# Patient Record
Sex: MALE | Race: WHITE | NOT HISPANIC OR LATINO | Employment: OTHER
[De-identification: names, ages, dates, MRNs, and addresses within clinical notes are randomized per-mention and may not be internally consistent; named-entity substitution may affect disease eponyms.]

---

## 2022-12-02 ENCOUNTER — TRANSCRIBE ORDERS (OUTPATIENT)
Dept: OTHER | Age: 77
End: 2022-12-02

## 2022-12-02 DIAGNOSIS — H11.221 CONJUNCTIVAL GRANULOMA, RIGHT EYE: Primary | ICD-10-CM

## 2022-12-05 ENCOUNTER — PRE VISIT (OUTPATIENT)
Dept: OPHTHALMOLOGY | Facility: CLINIC | Age: 77
End: 2022-12-05

## 2022-12-05 ENCOUNTER — OFFICE VISIT (OUTPATIENT)
Dept: OPHTHALMOLOGY | Facility: CLINIC | Age: 77
End: 2022-12-05
Payer: COMMERCIAL

## 2022-12-05 DIAGNOSIS — H05.89: ICD-10-CM

## 2022-12-05 DIAGNOSIS — H11.221 CONJUNCTIVAL GRANULOMA, RIGHT EYE: Primary | ICD-10-CM

## 2022-12-05 PROCEDURE — 68110 EXC LES CONJUNCTIVA <1 CM: CPT | Mod: RT | Performed by: OPHTHALMOLOGY

## 2022-12-05 PROCEDURE — 99203 OFFICE O/P NEW LOW 30 MIN: CPT | Mod: 25 | Performed by: OPHTHALMOLOGY

## 2022-12-05 PROCEDURE — 88305 TISSUE EXAM BY PATHOLOGIST: CPT | Mod: 26 | Performed by: DERMATOLOGY

## 2022-12-05 PROCEDURE — 88341 IMHCHEM/IMCYTCHM EA ADD ANTB: CPT | Mod: 26 | Performed by: DERMATOLOGY

## 2022-12-05 PROCEDURE — 92285 EXTERNAL OCULAR PHOTOGRAPHY: CPT | Mod: GC | Performed by: OPHTHALMOLOGY

## 2022-12-05 PROCEDURE — 88342 IMHCHEM/IMCYTCHM 1ST ANTB: CPT | Mod: 26 | Performed by: DERMATOLOGY

## 2022-12-05 PROCEDURE — 88305 TISSUE EXAM BY PATHOLOGIST: CPT | Mod: TC | Performed by: OPHTHALMOLOGY

## 2022-12-05 RX ORDER — TAMSULOSIN HYDROCHLORIDE 0.4 MG/1
CAPSULE ORAL
COMMUNITY
Start: 2022-03-14

## 2022-12-05 RX ORDER — LATANOPROST 50 UG/ML
1 SOLUTION/ DROPS OPHTHALMIC
COMMUNITY
End: 2023-12-04

## 2022-12-05 RX ORDER — ERYTHROMYCIN 5 MG/G
OINTMENT OPHTHALMIC ONCE
Status: COMPLETED | OUTPATIENT
Start: 2022-12-05 | End: 2022-12-05

## 2022-12-05 RX ORDER — METHOCARBAMOL 750 MG/1
TABLET, FILM COATED ORAL
COMMUNITY
Start: 2021-10-20

## 2022-12-05 RX ORDER — BUPROPION HYDROCHLORIDE 75 MG/1
TABLET ORAL
COMMUNITY

## 2022-12-05 RX ORDER — ERYTHROMYCIN 5 MG/G
1 OINTMENT OPHTHALMIC
COMMUNITY

## 2022-12-05 RX ORDER — TRAZODONE HYDROCHLORIDE 100 MG/1
1 TABLET ORAL AT BEDTIME
COMMUNITY
Start: 2021-12-27

## 2022-12-05 RX ORDER — PRAVASTATIN SODIUM 40 MG
TABLET ORAL
COMMUNITY
Start: 2021-10-20

## 2022-12-05 RX ORDER — ACETAMINOPHEN 500 MG
TABLET ORAL
Status: ON HOLD | COMMUNITY
Start: 2021-10-20 | End: 2023-04-05

## 2022-12-05 RX ORDER — FEBUXOSTAT 80 MG/1
TABLET, FILM COATED ORAL
COMMUNITY
Start: 2022-03-14

## 2022-12-05 RX ORDER — ALLOPURINOL 100 MG/1
250 TABLET ORAL DAILY
COMMUNITY

## 2022-12-05 RX ORDER — DULOXETIN HYDROCHLORIDE 60 MG/1
CAPSULE, DELAYED RELEASE ORAL
COMMUNITY
Start: 2021-05-11

## 2022-12-05 RX ADMIN — ERYTHROMYCIN: 5 OINTMENT OPHTHALMIC at 15:17

## 2022-12-05 ASSESSMENT — CONF VISUAL FIELD
OS_INFERIOR_NASAL_RESTRICTION: 0
OS_NORMAL: 1
OS_SUPERIOR_TEMPORAL_RESTRICTION: 0
METHOD: COUNTING FINGERS
OD_NORMAL: 1
OD_INFERIOR_TEMPORAL_RESTRICTION: 0
OD_SUPERIOR_TEMPORAL_RESTRICTION: 0
OD_SUPERIOR_NASAL_RESTRICTION: 0
OS_INFERIOR_TEMPORAL_RESTRICTION: 0
OS_SUPERIOR_NASAL_RESTRICTION: 0
OD_INFERIOR_NASAL_RESTRICTION: 0

## 2022-12-05 ASSESSMENT — SLIT LAMP EXAM - LIDS: COMMENTS: NORMAL

## 2022-12-05 ASSESSMENT — VISUAL ACUITY
OD_CC: 20/80
OD_CC+: -1
OS_CC: 20/40
OD_SC: 20/60
OS_CC+: -1
METHOD: SNELLEN - LINEAR
CORRECTION_TYPE: GLASSES

## 2022-12-05 ASSESSMENT — TONOMETRY
OS_IOP_MMHG: 15
OD_IOP_MMHG: 14
IOP_METHOD: ICARE

## 2022-12-05 ASSESSMENT — EXTERNAL EXAM - LEFT EYE: OS_EXAM: NORMAL

## 2022-12-05 NOTE — LETTER
2022         RE:  :  MRN: Arthur Garcia  1945  [id number]     Dear Dr. Weinstein,    Thank you for asking me to see your patient, Arthur Garcia, for an oculoplastic   consultation.  My assessment and plan are below.  For further details, please see my attached clinic note.      [facility]     Assessment & Plan     Arthur Garcia is a 77 year old male with the following diagnoses:   1. Conjunctival granuloma, right eye       Right lower lid lesion  - large pyogenic granuloma vs vascular lesion  - continue [facility]  - recommend excision in the operating room     PLAN:   Right lower lid conjunctival excision of lesion.           Again, thank you for allowing me to participate in the care of your patient.      Sincerely,    Keven Gomez MD  Department of Ophthalmology and Visual Neurosciences  [facility]    CC: [address] [address]  Via Fax: [phone]

## 2022-12-05 NOTE — PROGRESS NOTES
"Chief Complaints and History of Present Illnesses   Patient presents with     Consult For     Conjunctival pyogenic granuloma, right eye, referred by Dr Suma Em     Chief Complaint(s) and History of Present Illness(es)     Consult For    In right eye.  This started 3 years ago.  Associated symptoms include   dryness, eye pain, redness and photophobia.  Treatments tried include eye   drops, artificial tears and ointment.  Pain was noted as 2/10. Additional   comments: Conjunctival pyogenic granuloma, right eye, referred by Dr Suma Em           Comments    His right eye has been better in the past 3 years with redness, blurred   vision and a scratchy right cornea.   His right eye has been bleeding for   the past 2 weeks.  The bleeding typically happens at night but at times   hemorrhages during the day.  Three weeks ago he rubbed from the corner of   his eye what looked like flax seed.    Aubrie Enriquez, COT 2:13 PM  December 5, 2022                        6 month history of right corneal abrasion (now healed, but took several months), and was put on various drops/ointment, including steroid drops (still using). 3 weeks ago he noticed development of lower lid lesion with bloody drainage. First he noticed a \"flax seed\" from his lower lid, and since then has noticed daily bleeding usually at night. No recent URI.    Currently using tobradex drop 3-4x/day     [facility]     Assessment & Plan     Arthur Garcia is a 77 year old male with the following diagnoses:   1. Conjunctival granuloma, right eye       Right lower lid lesion  - large pyogenic granuloma vs vascular lesion  - [facility]  - recommend excision in the operating room     PLAN:   Right lower lid conjunctival excision of lesion.             Chaya Mane MD  Oculoplastics Fellow    Attending Physician Attestation:  I have seen and examined this patient with the fellow .  I have confirmed and edited as " necessary the chief complaint(s), history of present illness, review of systems, relevant history, and examination findings as documented by others.  I have personally reviewed the relevant tests, images, and reports as documented above.  I have confirmed and edited as necessary the assessment and plan and agree with this note.    - Keven Gomez MD 2:54 PM 12/5/2022    Today with Arthur Garcia, I reviewed the indications, risks, benefits, and alternatives of the proposed surgical procedure including, but not limited to, failure obtain the desired result  and need for additional surgery, bleeding, infection, loss of vision, loss of the eye, and the remote possibility of permanent damage to any organ system or death with the use of anesthesia.  I provided multiple opportunities for the questions, answered all questions to the best of my ability, and confirmed that my answers and my discussion were understood.   - Keven Gomez MD 2:54 PM 12/5/2022

## 2022-12-05 NOTE — NURSING NOTE
Chief Complaints and History of Present Illnesses   Patient presents with     Consult For     Conjunctival pyogenic granuloma, right eye, referred by Dr Suma Em     Chief Complaint(s) and History of Present Illness(es)     Consult For            Laterality: right eye    Onset: 3 years ago    Associated symptoms: dryness, eye pain, redness and photophobia    Treatments tried: eye drops, artificial tears and ointment    Pain scale: 2/10    Comments: Conjunctival pyogenic granuloma, right eye, referred by Dr Suma Em          Comments    His right eye has been better in the past 3 years with redness, blurred vision and a scratchy right cornea.   His right eye has been bleeding for the past 2 weeks.  The bleeding typically happens at night but at times hemorrhages during the day.  Three weeks ago he rubbed from the corner of his eye what looked like flax seed.    Aubrie Enriquez, COT 2:13 PM  December 5, 2022                            \

## 2022-12-05 NOTE — TELEPHONE ENCOUNTER
FUTURE VISIT INFORMATION      FUTURE VISIT INFORMATION:    Date: 12/5/22    Time: 2:30pm    Location: [facility]  REFERRAL INFORMATION:    Referring provider:  Dr Suma Arango    Referring providers clinic:  [facility]    Reason for visit/diagnosis  Conjunctival pyogenic granuloma OD. Excision/biopsy OD    RECORDS REQUESTED FROM:       Clinic name Comments Records Status Imaging Status   [facility] Urgent Request for recs sent 12/5

## 2022-12-05 NOTE — PATIENT INSTRUCTIONS
Keven Gomez M.D.    POST OPERATIVE INSTRUCTIONS   OFFICE EYELID SURGERY      Ice pack immediately after surgery. Alternate ten minutes on and ten minutes off for the first 24 - 48 hours, while in a reclined position with two pillows under your head while awake.     You can use Tylenol extra strength for pain as directed on the bottle.     Sleep with two pillows under your head for the first night following surgery.      If bandaged, remove the dressing 24 hours after surgery.     Use ointment along the incision both morning and evening until your return visit. If you get ointment in your eye, it will blur your vision slightly.     Avoid aspirin or anti-inflammatory medications such as Advil or Motrin for one week following your surgery unless otherwise directed.     Avoid strenuous activity or straining for the first week after surgery.     Bathing and showers are OK but to facilitate healing, do not wash or apply water to the sutured areas.     Small amounts of bright red blood normally stain the bandages. Some blurring of vision can be expected due to drainage and ointment in the eyes.     If your eyes swell shut, you cannot establish vision in the operated eye, or the pain is not reasonably controlled with medication you must contact the eye clinic immediately.     If you should have a problem after normal office hours, you can reach the ophthalmologist on call at [phone]. If for some reason no one can be reached, proceed to the nearest emergency room for evaluation and treatment.

## 2022-12-12 DIAGNOSIS — C43.112 MALIGNANT MELANOMA OF RIGHT LOWER EYELID INCLUDING CANTHUS (H): Primary | ICD-10-CM

## 2022-12-12 LAB
PATH REPORT.COMMENTS IMP SPEC: ABNORMAL
PATH REPORT.COMMENTS IMP SPEC: YES
PATH REPORT.FINAL DX SPEC: ABNORMAL
PATH REPORT.GROSS SPEC: ABNORMAL
PATH REPORT.MICROSCOPIC SPEC OTHER STN: ABNORMAL
PATH REPORT.RELEVANT HX SPEC: ABNORMAL
PHOTO IMAGE: ABNORMAL

## 2022-12-13 NOTE — TELEPHONE ENCOUNTER
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) - Referred by Chaya Mane MD in [facility] OPHTHALMOLOGY    RECORDS REQUESTED FROM:       Clinic name Comments Records Status Imaging Status   [facility] EYE [facility]  12/5/22 note from Keven Gomez MD [facility] 12/5/22 Eyelid, Lower, Right  (Case: [id number]  ) [address] [address]    [phone]    MRN: [id number] 11/29/18 MR Angio Neck and Head  11/29/18 MR Brain  Care everywhere  req 12/13/22 - PACS                       12/13/22 3:05PM called [facility], connected with film room. Left a message with film room to push images - Amay  12/14/22 6:33am - images push to pacs -Anne  
Eloped (saw a physician/midlevel provider but left without telling anyone)

## 2022-12-14 ENCOUNTER — PRE VISIT (OUTPATIENT)
Dept: OTOLARYNGOLOGY | Facility: CLINIC | Age: 77
End: 2022-12-14

## 2022-12-15 ENCOUNTER — TELEPHONE (OUTPATIENT)
Dept: OPHTHALMOLOGY | Facility: CLINIC | Age: 77
End: 2022-12-15

## 2022-12-15 NOTE — TELEPHONE ENCOUNTER
LVM for patient regarding scheduling an MRI and PET Scan -Per Dina from [facility] states the patient is already authorized to be scheduled for an MRI, ok to schedule and is sending over authorization for the PET scan and ENT Consult. Provided Imaging Number and direct number for scheduling.

## 2022-12-19 ENCOUNTER — TELEPHONE (OUTPATIENT)
Dept: OPHTHALMOLOGY | Facility: CLINIC | Age: 77
End: 2022-12-19

## 2022-12-19 ENCOUNTER — HOSPITAL ENCOUNTER (OUTPATIENT)
Dept: MRI IMAGING | Facility: CLINIC | Age: 77
Discharge: HOME | End: 2022-12-19
Attending: STUDENT IN AN ORGANIZED HEALTH CARE EDUCATION/TRAINING PROGRAM | Admitting: STUDENT IN AN ORGANIZED HEALTH CARE EDUCATION/TRAINING PROGRAM
Payer: COMMERCIAL

## 2022-12-19 DIAGNOSIS — C43.112 MALIGNANT MELANOMA OF RIGHT LOWER EYELID INCLUDING CANTHUS (H): ICD-10-CM

## 2022-12-19 PROCEDURE — A9585 GADOBUTROL INJECTION: HCPCS | Performed by: STUDENT IN AN ORGANIZED HEALTH CARE EDUCATION/TRAINING PROGRAM

## 2022-12-19 PROCEDURE — 255N000002 HC RX 255 OP 636: Performed by: STUDENT IN AN ORGANIZED HEALTH CARE EDUCATION/TRAINING PROGRAM

## 2022-12-19 PROCEDURE — 70543 MRI ORBT/FAC/NCK W/O &W/DYE: CPT

## 2022-12-19 PROCEDURE — 70543 MRI ORBT/FAC/NCK W/O &W/DYE: CPT | Mod: 26 | Performed by: STUDENT IN AN ORGANIZED HEALTH CARE EDUCATION/TRAINING PROGRAM

## 2022-12-19 RX ORDER — GADOBUTROL 604.72 MG/ML
10 INJECTION INTRAVENOUS ONCE
Status: COMPLETED | OUTPATIENT
Start: 2022-12-19 | End: 2022-12-19

## 2022-12-19 RX ADMIN — GADOBUTROL 10 ML: 604.72 INJECTION INTRAVENOUS at 15:05

## 2022-12-19 NOTE — TELEPHONE ENCOUNTER
Called patient last week and again today to discuss biopsy results that are positive for melanoma. Left a voicemail explaining that I am calling to discuss his results and left our clinic call back number.

## 2022-12-20 ENCOUNTER — TELEPHONE (OUTPATIENT)
Dept: OPHTHALMOLOGY | Facility: CLINIC | Age: 77
End: 2022-12-20

## 2022-12-20 NOTE — TELEPHONE ENCOUNTER
LVM for patient regarding scheduling a Telephone Return Visit with  . Can offer scheduling with  on 12/21/22 at 1pm or with  on 12/26/22 in the morning. Provided direct number for scheduling.

## 2022-12-21 ENCOUNTER — VIRTUAL VISIT (OUTPATIENT)
Dept: OPHTHALMOLOGY | Facility: CLINIC | Age: 77
End: 2022-12-21
Payer: COMMERCIAL

## 2022-12-21 ENCOUNTER — TELEPHONE (OUTPATIENT)
Dept: OTOLARYNGOLOGY | Facility: CLINIC | Age: 77
End: 2022-12-21

## 2022-12-21 ENCOUNTER — TELEPHONE (OUTPATIENT)
Dept: OPHTHALMOLOGY | Facility: CLINIC | Age: 77
End: 2022-12-21

## 2022-12-21 DIAGNOSIS — C43.112 MALIGNANT MELANOMA OF RIGHT LOWER EYELID INCLUDING CANTHUS (H): Primary | ICD-10-CM

## 2022-12-21 PROCEDURE — 99441 PR PHYSICIAN TELEPHONE EVALUATION 5-10 MIN: CPT | Mod: 95 | Performed by: STUDENT IN AN ORGANIZED HEALTH CARE EDUCATION/TRAINING PROGRAM

## 2022-12-21 NOTE — PROGRESS NOTES
"Arthur is a 77 year old who is being evaluated via a billable telephone visit.      What phone number would you like to be contacted at? [phone]  How would you like to obtain your AVS? MyChart    Distant Location (provider location):  On-site      Subjective   Arthur is a 77 year old presenting for the following health issues:  No chief complaint on file.      HPI     Right lower eyelid lesion biopsy, path positive for melanoma. Patient is healing well from the biopsy. We discussed the results of his biopsy, and he states that he has had \"malignant melanoma in my left eye and buttocks\" over the past 20-30 years. He obtained a MRI and PET CT, and has an appointment scheduled with Dr. Warner today that he is aware of.     Review of Systems   Constitutional, HEENT, cardiovascular, pulmonary, gi and gu systems are negative, except as otherwise noted.      Objective           Vitals:  No vitals were obtained today due to virtual visit.    Physical Exam   healthy, alert and no distress  PSYCH: Alert and oriented times 3; coherent speech, normal   rate and volume, able to articulate logical thoughts, able   to abstract reason, no tangential thoughts, no hallucinations   or delusions  His affect is normal  RESP: No cough, no audible wheezing, able to talk in full sentences  Remainder of exam unable to be completed due to telephone visits    A/P:       s/p right lower eyelid biopsy 12/5/22  Path: malignant melanoma  MRI orbit: negative for metastasis, no orbital masses/lesions identified  Follow up with Dr. Warner as scheduled today  Obtain PET CT  Follow up with Dr. Gomez in 3 weeks    Phone call duration: 10 minutes    Chaya Mane MD  Oculoplastic Surgery Fellow  Attending Physician Attestation: Complete documentation of historical and exam elements from today's encounter can be found in the full encounter summary report (not reduplicated in this progress note). I personally obtained the chief complaint(s) and " history of present illness. I confirmed and edited as necessary the review of systems, past medical/surgical history, family history, social history, and examination findings as documented by others; and I examined the patient myself. I personally reviewed the relevant tests, images, and reports as documented above. I formulated and edited as necessary the assessment and plan and discussed the findings and management plan with the patient   -Chaya Mane MD

## 2022-12-21 NOTE — TELEPHONE ENCOUNTER
FUTURE VISIT INFORMATION      FUTURE VISIT INFORMATION:    Date: 1/4/23    Time: 1:30 PM    Location: [facility]  REFERRAL INFORMATION:    Referring provider:  Chaya Mane MD    Referring providers clinic:  [facility] EYE clinic [facility]     Reason for visit/diagnosis  [facility] to 12/21 per radha, patient confirmed new appt time and date (MRI scheduled for 12/19/22 and PET Scan is scheduled for 1/2/23) -MT 12/14. Written Order. 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 Per [facility] the ref will be faxed -Appt Per PT    RECORDS REQUESTED FROM:       Clinic name Comments Records Status Imaging Status   [facility] EYE clinic [facility]  12/21/22 virtual visit with Chaya Mane MD    12/5/22 note from Keven Gomez MD [facility] 12/5/22 Eyelid, Lower, Right  (Case: [id number]  ) [address] [address]    [phone]    MRN: [id number] 11/29/18 MR Angio Neck and Head  11/29/18 MR Brain  Care everywhere   PACS    Imaging - [facility] 12/19/22 MR orbit  1/2/23 PET CT (SCHED) [facility] Pacs

## 2022-12-21 NOTE — TELEPHONE ENCOUNTER
Spoke with patient regarding scheduling with Dr. Gomez in the next few weeks for an In-Clinic Visit.  ok'd to see patient after OR and patient has ENT appointment same day. Scheduled patient accordingly. Patient asked if this appointment would replace the Telephone visit at 1pm today. I informed patient that  had called patient around 11am for Telephone Visit. Patient states he never had a call. Told patient I would look into this and have  call patient again. Patient said he is with his phone and has the ringer turned on.-Per Patient

## 2022-12-21 NOTE — TELEPHONE ENCOUNTER
Patient called regarding rescheduling ENT Appointment for a Telephone Visit due to weather conditions. Rescheduled ENT Visit for 1/4/23 after PET Scan has been completed. Scheduled patient for a Telephone Visit today 12/21/22 with  to discuss MRI Results. Sent AVS Printout with PET Scan prep and new ENT Appointment.-Per Patient

## 2023-01-02 ENCOUNTER — HOSPITAL ENCOUNTER (OUTPATIENT)
Dept: PET IMAGING | Facility: CLINIC | Age: 78
Discharge: HOME | End: 2023-01-02
Attending: STUDENT IN AN ORGANIZED HEALTH CARE EDUCATION/TRAINING PROGRAM | Admitting: STUDENT IN AN ORGANIZED HEALTH CARE EDUCATION/TRAINING PROGRAM
Payer: COMMERCIAL

## 2023-01-02 DIAGNOSIS — C43.112 MALIGNANT MELANOMA OF RIGHT LOWER EYELID INCLUDING CANTHUS (H): ICD-10-CM

## 2023-01-02 LAB
CREAT BLD-MCNC: 1.7 MG/DL (ref 0.7–1.3)
GFR SERPL CREATININE-BSD FRML MDRD: 41 ML/MIN/1.73M2

## 2023-01-02 PROCEDURE — 343N000001 HC RX 343: Performed by: STUDENT IN AN ORGANIZED HEALTH CARE EDUCATION/TRAINING PROGRAM

## 2023-01-02 PROCEDURE — 71260 CT THORAX DX C+: CPT | Mod: 26 | Performed by: RADIOLOGY

## 2023-01-02 PROCEDURE — 74177 CT ABD & PELVIS W/CONTRAST: CPT | Mod: 26 | Performed by: RADIOLOGY

## 2023-01-02 PROCEDURE — 250N000011 HC RX IP 250 OP 636: Performed by: STUDENT IN AN ORGANIZED HEALTH CARE EDUCATION/TRAINING PROGRAM

## 2023-01-02 PROCEDURE — 78815 PET IMAGE W/CT SKULL-THIGH: CPT | Mod: 26 | Performed by: RADIOLOGY

## 2023-01-02 PROCEDURE — A9552 F18 FDG: HCPCS | Performed by: STUDENT IN AN ORGANIZED HEALTH CARE EDUCATION/TRAINING PROGRAM

## 2023-01-02 PROCEDURE — 78815 PET IMAGE W/CT SKULL-THIGH: CPT | Mod: PI

## 2023-01-02 PROCEDURE — 74177 CT ABD & PELVIS W/CONTRAST: CPT

## 2023-01-02 PROCEDURE — 82565 ASSAY OF CREATININE: CPT

## 2023-01-02 RX ORDER — IOPAMIDOL 755 MG/ML
10-135 INJECTION, SOLUTION INTRAVASCULAR ONCE
Status: COMPLETED | OUTPATIENT
Start: 2023-01-02 | End: 2023-01-02

## 2023-01-02 RX ADMIN — IOPAMIDOL 123 ML: 755 INJECTION, SOLUTION INTRAVENOUS at 08:40

## 2023-01-02 RX ADMIN — FLUDEOXYGLUCOSE F-18 17.85 MCI.: 500 INJECTION, SOLUTION INTRAVENOUS at 07:40

## 2023-01-04 ENCOUNTER — PREP FOR PROCEDURE (OUTPATIENT)
Dept: OTOLARYNGOLOGY | Facility: CLINIC | Age: 78
End: 2023-01-04

## 2023-01-04 ENCOUNTER — PRE VISIT (OUTPATIENT)
Dept: OTOLARYNGOLOGY | Facility: CLINIC | Age: 78
End: 2023-01-04

## 2023-01-04 ENCOUNTER — OFFICE VISIT (OUTPATIENT)
Dept: OPHTHALMOLOGY | Facility: CLINIC | Age: 78
End: 2023-01-04
Payer: COMMERCIAL

## 2023-01-04 ENCOUNTER — OFFICE VISIT (OUTPATIENT)
Dept: OTOLARYNGOLOGY | Facility: CLINIC | Age: 78
End: 2023-01-04
Attending: STUDENT IN AN ORGANIZED HEALTH CARE EDUCATION/TRAINING PROGRAM
Payer: COMMERCIAL

## 2023-01-04 VITALS
TEMPERATURE: 98.5 F | SYSTOLIC BLOOD PRESSURE: 146 MMHG | OXYGEN SATURATION: 96 % | DIASTOLIC BLOOD PRESSURE: 82 MMHG | HEIGHT: 70 IN | WEIGHT: 315 LBS | BODY MASS INDEX: 45.1 KG/M2

## 2023-01-04 DIAGNOSIS — C43.112 MALIGNANT MELANOMA OF RIGHT LOWER EYELID INCLUDING CANTHUS (H): Primary | ICD-10-CM

## 2023-01-04 DIAGNOSIS — E66.01 OBESITY, MORBID, BMI 40.0-49.9 (H): ICD-10-CM

## 2023-01-04 DIAGNOSIS — J43.9 PULMONARY EMPHYSEMA, UNSPECIFIED EMPHYSEMA TYPE (H): Primary | ICD-10-CM

## 2023-01-04 DIAGNOSIS — C43.112 MALIGNANT MELANOMA OF RIGHT LOWER EYELID INCLUDING CANTHUS (H): ICD-10-CM

## 2023-01-04 DIAGNOSIS — C43.9 MELANOMA OF SKIN (H): Primary | ICD-10-CM

## 2023-01-04 DIAGNOSIS — N18.30 STAGE 3 CHRONIC KIDNEY DISEASE, UNSPECIFIED WHETHER STAGE 3A OR 3B CKD (H): ICD-10-CM

## 2023-01-04 DIAGNOSIS — F39 AFFECTIVE PSYCHOSIS (H): ICD-10-CM

## 2023-01-04 PROCEDURE — 99213 OFFICE O/P EST LOW 20 MIN: CPT | Performed by: OPHTHALMOLOGY

## 2023-01-04 PROCEDURE — 99203 OFFICE O/P NEW LOW 30 MIN: CPT | Performed by: OTOLARYNGOLOGY

## 2023-01-04 ASSESSMENT — PAIN SCALES - GENERAL: PAINLEVEL: MODERATE PAIN (5)

## 2023-01-04 NOTE — NURSING NOTE
"Chief Complaint   Patient presents with     Consult     Blood pressure (!) 146/82, temperature 98.5  F (36.9  C), height 1.778 m (5' 10\"), weight 142.9 kg (315 lb), SpO2 96 %.    Hill Yee LPN  V    "

## 2023-01-04 NOTE — PROGRESS NOTES
HISTORY OF PRESENT ILLNESS:  The patient is seen at the request of Dr. Gomez.  He is a patient with a recently diagnosed malignant melanoma at the inferior vertex of his sclerae and conjunctiva on the right side.  The melanoma was biopsied and revealed ulceration with a depth of 4.1 mm in the biopsy.  The patient has had several melanomas in the past including some on the skin on the left side of his eye.  He has not noticed any lumps or bumps in his neck and actually feels a little better now that Dr. Gomez has removed part of it.  He has prior biopsy.        Past Medical History:   Diagnosis Date     Arthritis      Depression      Disorder of lipoprotein and lipid metabolism      Gout      Hypertension      Past Surgical History:   Procedure Laterality Date     APPENDECTOMY  01/01/2000     CHOLECYSTECTOMY  1999 or 2000     [place] TRABECULOPLASTY BY LASER SURGERY         Current Outpatient Medications:      acetaminophen (TYLENOL) 500 MG tablet, TAKE TWO TABLETS BY MOUTH FOUR TIMES A DAY AS NEEDED FOR PAIN (MAXIMUM DOSE IS 4000MG PER DAY), Disp: , Rfl:      allopurinol (ZYLOPRIM) 100 MG tablet, Take 250 mg by mouth daily, Disp: , Rfl:      atenolol (TENORMIN) 25 MG tablet, Take 25 mg by mouth daily Once daily, Disp: , Rfl:      buPROPion (WELLBUTRIN) 75 MG tablet, , Disp: , Rfl:      buPROPion (WELLBUTRIN) 75 MG tablet, Once daily, Disp: , Rfl:      cholecalciferol 50 MCG (2000 UT) tablet, TAKE ONE TABLET BY MOUTH DAILY FOR VITAMIN D SUPPLEMENT TAKE WITH EVENING MEAL., Disp: , Rfl:      dexamethasone (DECADRON) 0.1 % ophthalmic suspension, Apply 1 drop to eye, Disp: , Rfl:      DULoxetine (CYMBALTA) 60 MG capsule, TAKE ONE CAPSULE BY MOUTH EVERY MORNING FOR PAIN/DEPRESSION., Disp: , Rfl:      erythromycin (ROMYCIN) 5 MG/GM ophthalmic ointment, Apply 1 strip to eye, Disp: , Rfl:      febuxostat (ULORIC) 80 MG TABS tablet, TAKE ONE TABLET BY MOUTH EVERY MORNING FOR GOUT, Disp: , Rfl:      latanoprost (XALATAN)  0.005 % ophthalmic solution, Apply 1 drop to eye, Disp: , Rfl:      methocarbamol (ROBAXIN) 750 MG tablet, TAKE ONE TABLET BY MOUTH THREE TIMES A DAY AS NEEDED TO RELAX MUSCLES, Disp: , Rfl:      polyethylene glycol-propylene glycol (SYSTANE ULTRA) 0.4-0.3 % SOLN ophthalmic solution, INSTILL 1 DROP IN RIGHT EYE FOUR TIMES A DAY, Disp: , Rfl:      pravastatin (PRAVACHOL) 40 MG tablet, TAKE ONE TABLET BY MOUTH DAILY FOR CHOLESTEROL, Disp: , Rfl:      tamsulosin (FLOMAX) 0.4 MG capsule, TAKE TWO CAPSULES BY MOUTH ONCE A DAY FOR PROSTATE, Disp: , Rfl:      traZODone (DESYREL) 100 MG tablet, Take 1 tablet by mouth At Bedtime, Disp: , Rfl:      triamterene-hydrochlorothiazide (MAXZIDE) 75-50 MG per tablet, Take 1 tablet by mouth daily Once daily, Disp: , Rfl:   Allergies   Allergen Reactions     Atorvastatin Muscle Pain (Myalgia)     Codeine Hives     Simvastatin Muscle Pain (Myalgia)     Other reaction(s): Muscle pain, Joint pain     Propoxyphene Rash and Swelling     Skin Adhesives Rash     Social History     Tobacco Use     Smoking status: Never     Smokeless tobacco: Never   Substance Use Topics     Alcohol use: No     Review Of Systems  Skin: negative  Eyes: negative  Ears/Nose/Throat: negative  Respiratory: No shortness of breath, dyspnea on exertion, cough, or hemoptysis  Cardiovascular: negative  Gastrointestinal: negative  Genitourinary: negative  Musculoskeletal: negative  Neurologic: negative  Psychiatric: negative  Hematologic/Lymphatic/Immunologic: negative  Endocrine: negative      PHYSICAL EXAMINATION:  A limited examination was done with Dr. Gomez.  The lesion is actually much less visible now than in Dr. Gomez's prior pictures.  There is still some pigment on the inferior aspect of the sclerae I do not palpate any lymph nodes in the parotid bed or the neck.    IMPRESSION:  Malignant melanoma of the right eye.    PLAN:  I am coordinating with Dr. Gomez for performance of a wide local excision of  this and Dr. Gomez will reconstruct the lower lid and conjunctiva and I will perform a sentinel lymph node biopsy.  We will of course need Dr. Gomez to do the injection in nuclear medicine the day of the surgery.    Jose Guadalupe Warner MD, MS  Professor and Chair   Dept. of Otolaryngology-Head and Neck Surgery   [facility]       cc:    Keven Gomez M.D.   [facility]   [facility] [address]     Chaya Mane M.D.  [facility]  [address] [place] [address]

## 2023-01-04 NOTE — LETTER
1/4/2023       RE: Arthur Garcia  [id number] [address] [place] [id number]     Dear Colleague,    Thank you for referring your patient, Arthur Garcia, to the [facility] [place] [facility]. Please see a copy of my visit note below.    HISTORY OF PRESENT ILLNESS:  The patient is seen at the request of Dr. Gomez.  He is a patient with a recently diagnosed malignant melanoma at the inferior vertex of his sclerae and conjunctiva on the right side.  The melanoma was biopsied and revealed ulceration with a depth of 4.1 mm in the biopsy.  The patient has had several melanomas in the past including some on the skin on the left side of his eye.  He has not noticed any lumps or bumps in his neck and actually feels a little better now that Dr. Gomez has removed part of it.  He has prior biopsy.        Past Medical History:   Diagnosis Date     Arthritis      Depression      Disorder of lipoprotein and lipid metabolism      Gout      Hypertension      Past Surgical History:   Procedure Laterality Date     APPENDECTOMY  01/01/2000     CHOLECYSTECTOMY  1999 or 2000     [place] TRABECULOPLASTY BY LASER SURGERY         Current Outpatient Medications:      acetaminophen (TYLENOL) 500 MG tablet, TAKE TWO TABLETS BY MOUTH FOUR TIMES A DAY AS NEEDED FOR PAIN (MAXIMUM DOSE IS 4000MG PER DAY), Disp: , Rfl:      allopurinol (ZYLOPRIM) 100 MG tablet, Take 250 mg by mouth daily, Disp: , Rfl:      atenolol (TENORMIN) 25 MG tablet, Take 25 mg by mouth daily Once daily, Disp: , Rfl:      buPROPion (WELLBUTRIN) 75 MG tablet, , Disp: , Rfl:      buPROPion (WELLBUTRIN) 75 MG tablet, Once daily, Disp: , Rfl:      cholecalciferol 50 MCG (2000 UT) tablet, TAKE ONE TABLET BY MOUTH DAILY FOR VITAMIN D SUPPLEMENT TAKE WITH EVENING MEAL., Disp: , Rfl:      dexamethasone (DECADRON) 0.1 % ophthalmic suspension, Apply 1 drop to eye, Disp: , Rfl:      DULoxetine (CYMBALTA)  60 MG capsule, TAKE ONE CAPSULE BY MOUTH EVERY MORNING FOR PAIN/DEPRESSION., Disp: , Rfl:      erythromycin (ROMYCIN) 5 MG/GM ophthalmic ointment, Apply 1 strip to eye, Disp: , Rfl:      febuxostat (ULORIC) 80 MG TABS tablet, TAKE ONE TABLET BY MOUTH EVERY MORNING FOR GOUT, Disp: , Rfl:      latanoprost (XALATAN) 0.005 % ophthalmic solution, Apply 1 drop to eye, Disp: , Rfl:      methocarbamol (ROBAXIN) 750 MG tablet, TAKE ONE TABLET BY MOUTH THREE TIMES A DAY AS NEEDED TO RELAX MUSCLES, Disp: , Rfl:      polyethylene glycol-propylene glycol (SYSTANE ULTRA) 0.4-0.3 % SOLN ophthalmic solution, INSTILL 1 DROP IN RIGHT EYE FOUR TIMES A DAY, Disp: , Rfl:      pravastatin (PRAVACHOL) 40 MG tablet, TAKE ONE TABLET BY MOUTH DAILY FOR CHOLESTEROL, Disp: , Rfl:      tamsulosin (FLOMAX) 0.4 MG capsule, TAKE TWO CAPSULES BY MOUTH ONCE A DAY FOR PROSTATE, Disp: , Rfl:      traZODone (DESYREL) 100 MG tablet, Take 1 tablet by mouth At Bedtime, Disp: , Rfl:      triamterene-hydrochlorothiazide (MAXZIDE) 75-50 MG per tablet, Take 1 tablet by mouth daily Once daily, Disp: , Rfl:   Allergies   Allergen Reactions     Atorvastatin Muscle Pain (Myalgia)     Codeine Hives     Simvastatin Muscle Pain (Myalgia)     Other reaction(s): Muscle pain, Joint pain     Propoxyphene Rash and Swelling     Skin Adhesives Rash     Social History     Tobacco Use     Smoking status: Never     Smokeless tobacco: Never   Substance Use Topics     Alcohol use: No     Review Of Systems  Skin: negative  Eyes: negative  Ears/Nose/Throat: negative  Respiratory: No shortness of breath, dyspnea on exertion, cough, or hemoptysis  Cardiovascular: negative  Gastrointestinal: negative  Genitourinary: negative  Musculoskeletal: negative  Neurologic: negative  Psychiatric: negative  Hematologic/Lymphatic/Immunologic: negative  Endocrine: negative      PHYSICAL EXAMINATION:  A limited examination was done with Dr. Gomez.  The lesion is actually much less visible now  than in Dr. Gomez's prior pictures.  There is still some pigment on the inferior aspect of the sclerae I do not palpate any lymph nodes in the parotid bed or the neck.    IMPRESSION:  Malignant melanoma of the right eye.    PLAN:  I am coordinating with Dr. Gomez for performance of a wide local excision of this and Dr. Gomez will reconstruct the lower lid and conjunctiva and I will perform a sentinel lymph node biopsy.  We will of course need Dr. Gomez to do the injection in nuclear medicine the day of the surgery.    Jose Guadalupe Warner MD, MS  Professor and Chair   Dept. of Otolaryngology-Head and Neck Surgery   [facility]     cc:  Keven Gomez M.D.   [facility]   [facility] [address]     Chaya Mane M.D.  [facility]  [address] [address]

## 2023-01-04 NOTE — PATIENT INSTRUCTIONS
We will call you with surgery date.   2. Please call the ENT clinic with any questions,concerns, new or worsening symptoms.    -Clinic number is [phone]   - Anna's direct line (Dr. Warner's nurse) [phone]

## 2023-01-05 NOTE — PROGRESS NOTES
Follow up for biopsy of right conjunctiva. Biopsy showed melanoma. Patient has history of multiple melanomas in the past. Today seeing Dr. Warner for eval for sentinel lymph node biopsy.          Assessment & Plan     Arthur Garcia is a 77 year old male with the following diagnoses:   1. 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 Warner.               Attending Physician Attestation:  Complete documentation of historical and exam elements from today's encounter can be found in the full encounter summary report (not reduplicated in this progress note).  I personally obtained the chief complaint(s) and history of present illness.  I confirmed and edited as necessary the review of systems, past medical/surgical history, family history, social history, and examination findings as documented by others; and I examined the patient myself.  I personally reviewed the relevant tests, images, and reports as documented above.  I formulated and edited as necessary the assessment and plan and discussed the findings and management plan with the patient and family. I personally reviewed the ophthalmic test(s) associated with this encounter, and have completed the corresponding report(s) as necessary.   -Keven Gomez MD    Today with Arthur Garcia  and his wife, I reviewed the indications, risks, benefits, and alternatives of the proposed surgical procedure including, but not limited to, failure obtain the desired result  and need for additional surgery, bleeding, infection, loss of vision, loss of the eye, and the remote possibility of permanent damage to any organ system or death with the use of anesthesia.  I provided multiple opportunities for the questions, answered all questions to the best of my ability, and confirmed that my answers and my discussion were understood.     - Keven Gomez MD 1:24 PM 1/5/2023

## 2023-01-13 ENCOUNTER — TELEPHONE (OUTPATIENT)
Dept: OTOLARYNGOLOGY | Facility: CLINIC | Age: 78
End: 2023-01-13

## 2023-01-13 DIAGNOSIS — C43.112 MALIGNANT MELANOMA OF RIGHT LOWER EYELID INCLUDING CANTHUS (H): ICD-10-CM

## 2023-01-13 DIAGNOSIS — C43.9 MELANOMA OF SKIN (H): Primary | ICD-10-CM

## 2023-01-13 NOTE — TELEPHONE ENCOUNTER
Called patient to schedule surgery with Dr. Warner/Jason    Date of Surgery: 2/13    Location of surgery: [place]    Pre-Op H&P: PCP    Pre/Post Imaging:  Scheduled AM of surg    Discussed COVID-19 Testing: Not Applicable    Post-Op Appt Date: 1 week [facility]    Surgery Packet Mailed: 1/13      Additional comments: VIANEY Valencia on 1/13/2023 at 11:49 AM

## 2023-02-07 ENCOUNTER — TRANSFERRED RECORDS (OUTPATIENT)
Dept: MULTI SPECIALTY CLINIC | Facility: CLINIC | Age: 78
End: 2023-02-07

## 2023-02-07 LAB
ALT SERPL-CCNC: 39 U/L (ref 0–55)
AST SERPL-CCNC: 27 U/L (ref 5–40)
CREATININE (EXTERNAL): 2.1 MG/DL (ref 0.5–1.5)
GFR ESTIMATED (EXTERNAL): 33 ML/MIN
GLUCOSE (EXTERNAL): 93 MG/DL (ref 70–100)
POTASSIUM (EXTERNAL): 3.6 MMOL/L (ref 3.5–5)

## 2023-02-09 RX ORDER — LANOLIN ALCOHOL/MO/W.PET/CERES
3-6 CREAM (GRAM) TOPICAL
COMMUNITY

## 2023-02-09 RX ORDER — FLUTICASONE PROPIONATE 50 MCG
1 SPRAY, SUSPENSION (ML) NASAL DAILY
COMMUNITY

## 2023-02-09 RX ORDER — CELECOXIB 100 MG/1
100 CAPSULE ORAL 2 TIMES DAILY
COMMUNITY

## 2023-02-09 RX ORDER — LORATADINE 10 MG/1
10 TABLET ORAL DAILY
COMMUNITY

## 2023-02-09 RX ORDER — PREDNISOLONE ACETATE 10 MG/ML
1 SUSPENSION/ DROPS OPHTHALMIC 4 TIMES DAILY
COMMUNITY

## 2023-02-09 RX ORDER — CYCLOSPORINE 0.5 MG/ML
1 EMULSION OPHTHALMIC 2 TIMES DAILY
COMMUNITY

## 2023-02-09 RX ORDER — ASCORBIC ACID 500 MG
500 TABLET ORAL DAILY
COMMUNITY

## 2023-02-09 RX ORDER — ASPIRIN 81 MG/1
81 TABLET, CHEWABLE ORAL DAILY
COMMUNITY

## 2023-02-11 ENCOUNTER — ANESTHESIA EVENT (OUTPATIENT)
Dept: SURGERY | Facility: CLINIC | Age: 78
End: 2023-02-11
Payer: COMMERCIAL

## 2023-02-11 ASSESSMENT — COPD QUESTIONNAIRES: COPD: 1

## 2023-02-12 NOTE — ANESTHESIA PREPROCEDURE EVALUATION
Anesthesia Pre-Procedure Evaluation    Patient: Arthur Garcia   MRN: [id number] : 1945        Procedure : Procedure(s):  wide local excision of right conjuntivia lesion  with amniotic membrane  BIOPSY, LYMPH NODE, SENTINEL          Past Medical History:   Diagnosis Date     Arthritis      Depression      Disorder of lipoprotein and lipid metabolism      Gout      Hypertension       Past Surgical History:   Procedure Laterality Date     APPENDECTOMY  2000     CHOLECYSTECTOMY   or      OR TRABECULOPLASTY BY LASER SURGERY        Allergies   Allergen Reactions     Atorvastatin Muscle Pain (Myalgia)     Codeine Hives     Simvastatin Muscle Pain (Myalgia)     Other reaction(s): Muscle pain, Joint pain     Propoxyphene Rash and Swelling     Skin Adhesives Rash     Severely allergic to Coban - causing severe itching      Social History     Tobacco Use     Smoking status: Never     Smokeless tobacco: Never   Substance Use Topics     Alcohol use: No      Wt Readings from Last 1 Encounters:   23 142.9 kg (315 lb)        Anesthesia Evaluation   Pt has had prior anesthetic. Type: General.    No history of anesthetic complications       ROS/MED HX  ENT/Pulmonary:     (+) PIPPA risk factors, hypertension, obese, asthma COPD,     Neurologic:  - neg neurologic ROS     Cardiovascular:     (+) Dyslipidemia hypertension-----Taking blood thinners     METS/Exercise Tolerance:     Hematologic:  - neg hematologic  ROS     Musculoskeletal: Comment: Fibromyalgia, Gout      GI/Hepatic:  - neg GI/hepatic ROS     Renal/Genitourinary:     (+) renal disease, type: CRI, Pt does not require dialysis,     Endo:     (+) Obesity,     Psychiatric/Substance Use: Comment: PTSD    (+) psychiatric history other (comment)     Infectious Disease:  - neg infectious disease ROS     Malignancy: Comment: Wide local excision and sentinel lymph node biopsy for concern of melanoma.      Other:               OUTSIDE LABS:  CBC: No results  found for: WBC, HGB, HCT, PLT  BMP:   Lab Results   Component Value Date    CR 1.7 (H) 01/02/2023     COAGS: No results found for: PTT, INR, FIBR  POC: No results found for: BGM, HCG, HCGS  HEPATIC: No results found for: ALBUMIN, PROTTOTAL, ALT, AST, GGT, ALKPHOS, BILITOTAL, BILIDIRECT, KAELYN  OTHER: No results found for: PH, LACT, A1C, MACK, PHOS, MAG, LIPASE, AMYLASE, TSH, T4, T3, CRP, SED    Anesthesia Plan    ASA Status:  2      Anesthesia Type: General.     - Airway: ETT      Maintenance: Balanced.   Techniques and Equipment:     - Lines/Monitors: 2nd IV     - Drips/Meds: Phenylephrine     Consents            Postoperative Care    Pain management: IV analgesics, Oral pain medications.   PONV prophylaxis: Ondansetron (or other 5HT-3), Dexamethasone or Solumedrol     Comments:                Matthew Tobin MD

## 2023-02-13 ENCOUNTER — HOSPITAL ENCOUNTER (OUTPATIENT)
Facility: CLINIC | Age: 78
Discharge: HOME | End: 2023-02-13
Attending: OPHTHALMOLOGY | Admitting: OPHTHALMOLOGY
Payer: COMMERCIAL

## 2023-02-13 ENCOUNTER — ANESTHESIA (OUTPATIENT)
Dept: SURGERY | Facility: CLINIC | Age: 78
End: 2023-02-13
Payer: COMMERCIAL

## 2023-02-13 ENCOUNTER — HOSPITAL ENCOUNTER (OUTPATIENT)
Dept: NUCLEAR MEDICINE | Facility: CLINIC | Age: 78
Setting detail: NUCLEAR MEDICINE
Discharge: HOME | End: 2023-02-13
Attending: OTOLARYNGOLOGY | Admitting: OTOLARYNGOLOGY
Payer: COMMERCIAL

## 2023-02-13 VITALS
WEIGHT: 315 LBS | TEMPERATURE: 98 F | OXYGEN SATURATION: 95 % | SYSTOLIC BLOOD PRESSURE: 157 MMHG | HEIGHT: 69 IN | DIASTOLIC BLOOD PRESSURE: 96 MMHG | HEART RATE: 70 BPM | BODY MASS INDEX: 46.65 KG/M2 | RESPIRATION RATE: 16 BRPM

## 2023-02-13 DIAGNOSIS — C43.9 MELANOMA OF SKIN (H): ICD-10-CM

## 2023-02-13 DIAGNOSIS — C43.112 MALIGNANT MELANOMA OF RIGHT LOWER EYELID INCLUDING CANTHUS (H): Primary | ICD-10-CM

## 2023-02-13 DIAGNOSIS — C43.112 MALIGNANT MELANOMA OF RIGHT LOWER EYELID INCLUDING CANTHUS (H): ICD-10-CM

## 2023-02-13 LAB — GLUCOSE BLDC GLUCOMTR-MCNC: 144 MG/DL (ref 70–99)

## 2023-02-13 PROCEDURE — 250N000013 HC RX MED GY IP 250 OP 250 PS 637: Performed by: STUDENT IN AN ORGANIZED HEALTH CARE EDUCATION/TRAINING PROGRAM

## 2023-02-13 PROCEDURE — 343N000001 HC RX 343: Performed by: OTOLARYNGOLOGY

## 2023-02-13 PROCEDURE — 258N000003 HC RX IP 258 OP 636: Performed by: STUDENT IN AN ORGANIZED HEALTH CARE EDUCATION/TRAINING PROGRAM

## 2023-02-13 PROCEDURE — 88305 TISSUE EXAM BY PATHOLOGIST: CPT | Mod: 26 | Performed by: OPHTHALMOLOGY

## 2023-02-13 PROCEDURE — 710N000010 HC RECOVERY PHASE 1, LEVEL 2, PER MIN: Performed by: OPHTHALMOLOGY

## 2023-02-13 PROCEDURE — 370N000017 HC ANESTHESIA TECHNICAL FEE, PER MIN: Performed by: OPHTHALMOLOGY

## 2023-02-13 PROCEDURE — 88341 IMHCHEM/IMCYTCHM EA ADD ANTB: CPT | Mod: 26 | Performed by: OPHTHALMOLOGY

## 2023-02-13 PROCEDURE — 250N000009 HC RX 250: Performed by: OPHTHALMOLOGY

## 2023-02-13 PROCEDURE — 250N000011 HC RX IP 250 OP 636: Performed by: STUDENT IN AN ORGANIZED HEALTH CARE EDUCATION/TRAINING PROGRAM

## 2023-02-13 PROCEDURE — 710N000012 HC RECOVERY PHASE 2, PER MINUTE: Performed by: OPHTHALMOLOGY

## 2023-02-13 PROCEDURE — 88305 TISSUE EXAM BY PATHOLOGIST: CPT | Mod: TC | Performed by: OPHTHALMOLOGY

## 2023-02-13 PROCEDURE — 999N000141 HC STATISTIC PRE-PROCEDURE NURSING ASSESSMENT: Performed by: OPHTHALMOLOGY

## 2023-02-13 PROCEDURE — 250N000009 HC RX 250: Performed by: STUDENT IN AN ORGANIZED HEALTH CARE EDUCATION/TRAINING PROGRAM

## 2023-02-13 PROCEDURE — 250N000025 HC SEVOFLURANE, PER MIN: Performed by: OPHTHALMOLOGY

## 2023-02-13 PROCEDURE — 68115 EXC LES CONJUNCTIVA >1 CM: CPT | Mod: RT | Performed by: OPHTHALMOLOGY

## 2023-02-13 PROCEDURE — 67875 CLOSURE OF EYELID BY SUTURE: CPT | Mod: RT | Performed by: OPHTHALMOLOGY

## 2023-02-13 PROCEDURE — 65779 COVER EYE W/MEMBRANE SUTURE: CPT | Mod: RT | Performed by: OPHTHALMOLOGY

## 2023-02-13 PROCEDURE — V2790 AMNIOTIC MEMBRANE: HCPCS | Performed by: OPHTHALMOLOGY

## 2023-02-13 PROCEDURE — 78195 LYMPH SYSTEM IMAGING: CPT | Mod: 26 | Performed by: RADIOLOGY

## 2023-02-13 PROCEDURE — 360N000076 HC SURGERY LEVEL 3, PER MIN: Performed by: OPHTHALMOLOGY

## 2023-02-13 PROCEDURE — 250N000011 HC RX IP 250 OP 636: Performed by: OTOLARYNGOLOGY

## 2023-02-13 PROCEDURE — 272N000001 HC OR GENERAL SUPPLY STERILE: Performed by: OPHTHALMOLOGY

## 2023-02-13 PROCEDURE — 88342 IMHCHEM/IMCYTCHM 1ST ANTB: CPT | Mod: 26 | Performed by: OPHTHALMOLOGY

## 2023-02-13 PROCEDURE — 278N000051 HC OR IMPLANT GENERAL: Performed by: OPHTHALMOLOGY

## 2023-02-13 PROCEDURE — 82962 GLUCOSE BLOOD TEST: CPT

## 2023-02-13 PROCEDURE — A9520 TC99 TILMANOCEPT DIAG 0.5MCI: HCPCS | Performed by: OTOLARYNGOLOGY

## 2023-02-13 PROCEDURE — 78830 RP LOCLZJ TUM SPECT W/CT 1: CPT

## 2023-02-13 DEVICE — IMPLANTABLE DEVICE: Type: IMPLANTABLE DEVICE | Site: EYE | Status: FUNCTIONAL

## 2023-02-13 RX ORDER — LIDOCAINE HYDROCHLORIDE 20 MG/ML
INJECTION, SOLUTION INFILTRATION; PERINEURAL PRN
Status: DISCONTINUED | OUTPATIENT
Start: 2023-02-13 | End: 2023-02-13

## 2023-02-13 RX ORDER — HYDROMORPHONE HCL IN WATER/PF 6 MG/30 ML
0.2 PATIENT CONTROLLED ANALGESIA SYRINGE INTRAVENOUS EVERY 5 MIN PRN
Status: DISCONTINUED | OUTPATIENT
Start: 2023-02-13 | End: 2023-02-13 | Stop reason: HOSPADM

## 2023-02-13 RX ORDER — ONDANSETRON 2 MG/ML
4 INJECTION INTRAMUSCULAR; INTRAVENOUS EVERY 30 MIN PRN
Status: DISCONTINUED | OUTPATIENT
Start: 2023-02-13 | End: 2023-02-13 | Stop reason: HOSPADM

## 2023-02-13 RX ORDER — NEOMYCIN POLYMYXIN B SULFATES AND DEXAMETHASONE 3.5; 10000; 1 MG/ML; [USP'U]/ML; MG/ML
1 SUSPENSION/ DROPS OPHTHALMIC 4 TIMES DAILY
Qty: 3 ML | Refills: 0 | Status: ON HOLD | OUTPATIENT
Start: 2023-02-13 | End: 2023-04-19

## 2023-02-13 RX ORDER — SODIUM CHLORIDE, SODIUM LACTATE, POTASSIUM CHLORIDE, CALCIUM CHLORIDE 600; 310; 30; 20 MG/100ML; MG/100ML; MG/100ML; MG/100ML
INJECTION, SOLUTION INTRAVENOUS CONTINUOUS
Status: DISCONTINUED | OUTPATIENT
Start: 2023-02-13 | End: 2023-02-13 | Stop reason: HOSPADM

## 2023-02-13 RX ORDER — ERYTHROMYCIN 5 MG/G
OINTMENT OPHTHALMIC
Qty: 3.5 G | Refills: 0 | Status: SHIPPED | OUTPATIENT
Start: 2023-02-13

## 2023-02-13 RX ORDER — FENTANYL CITRATE 50 UG/ML
50 INJECTION, SOLUTION INTRAMUSCULAR; INTRAVENOUS EVERY 5 MIN PRN
Status: DISCONTINUED | OUTPATIENT
Start: 2023-02-13 | End: 2023-02-13 | Stop reason: HOSPADM

## 2023-02-13 RX ORDER — LABETALOL HYDROCHLORIDE 5 MG/ML
10 INJECTION, SOLUTION INTRAVENOUS
Status: DISCONTINUED | OUTPATIENT
Start: 2023-02-13 | End: 2023-02-13 | Stop reason: HOSPADM

## 2023-02-13 RX ORDER — DEXAMETHASONE SODIUM PHOSPHATE 4 MG/ML
INJECTION, SOLUTION INTRA-ARTICULAR; INTRALESIONAL; INTRAMUSCULAR; INTRAVENOUS; SOFT TISSUE PRN
Status: DISCONTINUED | OUTPATIENT
Start: 2023-02-13 | End: 2023-02-13

## 2023-02-13 RX ORDER — ONDANSETRON 2 MG/ML
INJECTION INTRAMUSCULAR; INTRAVENOUS PRN
Status: DISCONTINUED | OUTPATIENT
Start: 2023-02-13 | End: 2023-02-13

## 2023-02-13 RX ORDER — HYDROXYZINE HYDROCHLORIDE 10 MG/1
10 TABLET, FILM COATED ORAL EVERY 6 HOURS PRN
Status: DISCONTINUED | OUTPATIENT
Start: 2023-02-13 | End: 2023-02-13 | Stop reason: HOSPADM

## 2023-02-13 RX ORDER — LIDOCAINE 40 MG/G
CREAM TOPICAL
Status: DISCONTINUED | OUTPATIENT
Start: 2023-02-13 | End: 2023-02-13 | Stop reason: HOSPADM

## 2023-02-13 RX ORDER — OXYCODONE HYDROCHLORIDE 10 MG/1
10 TABLET ORAL EVERY 4 HOURS PRN
Status: DISCONTINUED | OUTPATIENT
Start: 2023-02-13 | End: 2023-02-13 | Stop reason: HOSPADM

## 2023-02-13 RX ORDER — PROPOFOL 10 MG/ML
INJECTION, EMULSION INTRAVENOUS PRN
Status: DISCONTINUED | OUTPATIENT
Start: 2023-02-13 | End: 2023-02-13

## 2023-02-13 RX ORDER — CEFAZOLIN SODIUM/WATER 3 G/30 ML
3 SYRINGE (ML) INTRAVENOUS
Status: COMPLETED | OUTPATIENT
Start: 2023-02-13 | End: 2023-02-13

## 2023-02-13 RX ORDER — ACETAMINOPHEN 325 MG/1
975 TABLET ORAL ONCE
Status: COMPLETED | OUTPATIENT
Start: 2023-02-13 | End: 2023-02-13

## 2023-02-13 RX ORDER — HYDROMORPHONE HCL IN WATER/PF 6 MG/30 ML
0.4 PATIENT CONTROLLED ANALGESIA SYRINGE INTRAVENOUS EVERY 5 MIN PRN
Status: DISCONTINUED | OUTPATIENT
Start: 2023-02-13 | End: 2023-02-13 | Stop reason: HOSPADM

## 2023-02-13 RX ORDER — OXYCODONE HYDROCHLORIDE 5 MG/1
5 TABLET ORAL EVERY 4 HOURS PRN
Status: DISCONTINUED | OUTPATIENT
Start: 2023-02-13 | End: 2023-02-13 | Stop reason: HOSPADM

## 2023-02-13 RX ORDER — FENTANYL CITRATE 50 UG/ML
25 INJECTION, SOLUTION INTRAMUSCULAR; INTRAVENOUS EVERY 5 MIN PRN
Status: DISCONTINUED | OUTPATIENT
Start: 2023-02-13 | End: 2023-02-13 | Stop reason: HOSPADM

## 2023-02-13 RX ORDER — ONDANSETRON 4 MG/1
4 TABLET, ORALLY DISINTEGRATING ORAL EVERY 30 MIN PRN
Status: DISCONTINUED | OUTPATIENT
Start: 2023-02-13 | End: 2023-02-13 | Stop reason: HOSPADM

## 2023-02-13 RX ORDER — FENTANYL CITRATE 50 UG/ML
INJECTION, SOLUTION INTRAMUSCULAR; INTRAVENOUS PRN
Status: DISCONTINUED | OUTPATIENT
Start: 2023-02-13 | End: 2023-02-13

## 2023-02-13 RX ORDER — CEFAZOLIN SODIUM/WATER 3 G/30 ML
3 SYRINGE (ML) INTRAVENOUS SEE ADMIN INSTRUCTIONS
Status: DISCONTINUED | OUTPATIENT
Start: 2023-02-13 | End: 2023-02-13 | Stop reason: HOSPADM

## 2023-02-13 RX ORDER — ACETAMINOPHEN 325 MG/1
975 TABLET ORAL ONCE
Status: DISCONTINUED | OUTPATIENT
Start: 2023-02-13 | End: 2023-02-13 | Stop reason: HOSPADM

## 2023-02-13 RX ORDER — LIDOCAINE HYDROCHLORIDE AND EPINEPHRINE 10; 10 MG/ML; UG/ML
INJECTION, SOLUTION INFILTRATION; PERINEURAL PRN
Status: DISCONTINUED | OUTPATIENT
Start: 2023-02-13 | End: 2023-02-13 | Stop reason: HOSPADM

## 2023-02-13 RX ADMIN — SODIUM CHLORIDE, POTASSIUM CHLORIDE, SODIUM LACTATE AND CALCIUM CHLORIDE: 600; 310; 30; 20 INJECTION, SOLUTION INTRAVENOUS at 10:50

## 2023-02-13 RX ADMIN — Medication 3 G: at 11:02

## 2023-02-13 RX ADMIN — PHENYLEPHRINE HYDROCHLORIDE 100 MCG: 10 INJECTION INTRAVENOUS at 11:27

## 2023-02-13 RX ADMIN — PHENYLEPHRINE HYDROCHLORIDE 100 MCG: 10 INJECTION INTRAVENOUS at 11:29

## 2023-02-13 RX ADMIN — DEXAMETHASONE SODIUM PHOSPHATE 4 MG: 4 INJECTION, SOLUTION INTRA-ARTICULAR; INTRALESIONAL; INTRAMUSCULAR; INTRAVENOUS; SOFT TISSUE at 11:28

## 2023-02-13 RX ADMIN — ONDANSETRON 4 MG: 2 INJECTION INTRAMUSCULAR; INTRAVENOUS at 11:46

## 2023-02-13 RX ADMIN — PROPOFOL 20 MG: 10 INJECTION, EMULSION INTRAVENOUS at 11:41

## 2023-02-13 RX ADMIN — ACETAMINOPHEN 975 MG: 325 TABLET, FILM COATED ORAL at 08:30

## 2023-02-13 RX ADMIN — FENTANYL CITRATE 50 MCG: 50 INJECTION, SOLUTION INTRAMUSCULAR; INTRAVENOUS at 12:50

## 2023-02-13 RX ADMIN — NOREPINEPHRINE BITARTRATE 6.4 MCG: 1 INJECTION, SOLUTION, CONCENTRATE INTRAVENOUS at 11:32

## 2023-02-13 RX ADMIN — TILMANOCEPT 0.5 MILLICURIE: KIT at 09:01

## 2023-02-13 RX ADMIN — FENTANYL CITRATE 50 MCG: 50 INJECTION, SOLUTION INTRAMUSCULAR; INTRAVENOUS at 11:53

## 2023-02-13 RX ADMIN — OXYCODONE HYDROCHLORIDE 10 MG: 10 TABLET ORAL at 12:51

## 2023-02-13 RX ADMIN — FENTANYL CITRATE 50 MCG: 50 INJECTION, SOLUTION INTRAMUSCULAR; INTRAVENOUS at 12:57

## 2023-02-13 RX ADMIN — LIDOCAINE HYDROCHLORIDE 100 MG: 20 INJECTION, SOLUTION INFILTRATION; PERINEURAL at 10:55

## 2023-02-13 RX ADMIN — Medication 50 MG: at 10:55

## 2023-02-13 RX ADMIN — FENTANYL CITRATE 50 MCG: 50 INJECTION, SOLUTION INTRAMUSCULAR; INTRAVENOUS at 10:55

## 2023-02-13 RX ADMIN — SUGAMMADEX 200 MG: 100 INJECTION, SOLUTION INTRAVENOUS at 12:04

## 2023-02-13 RX ADMIN — PROPOFOL 150 MG: 10 INJECTION, EMULSION INTRAVENOUS at 10:55

## 2023-02-13 ASSESSMENT — ACTIVITIES OF DAILY LIVING (ADL)
ADLS_ACUITY_SCORE: 23
ADLS_ACUITY_SCORE: 35
ADLS_ACUITY_SCORE: 23
ADLS_ACUITY_SCORE: 23

## 2023-02-13 NOTE — OR NURSING
Pt's friend Jose Castelan will be picking patient up upon discharge. Writer confirmed transport preop  [phone]

## 2023-02-13 NOTE — ANESTHESIA CARE TRANSFER NOTE
Patient: Arthur Garcia    Procedure: Procedure(s):  wide local excision of right conjuntivia lesion  with amniotic membrane       Diagnosis: Melanoma of skin (H) [C43.9]  Diagnosis Additional Information: No value filed.    Anesthesia Type:   General     Note:      Level of Consciousness: drowsy  Oxygen Supplementation: face mask    Independent Airway: airway patency satisfactory and stable  Dentition: dentition unchanged  Vital Signs Stable: post-procedure vital signs reviewed and stable  Report to RN Given: handoff report given  Patient transferred to: PACU    Handoff Report: Identifed the Patient, Identified the Reponsible Provider, Reviewed the pertinent medical history, Discussed the surgical course, Reviewed Intra-OP anesthesia mangement and issues during anesthesia, Set expectations for post-procedure period and Allowed opportunity for questions and acknowledgement of understanding      Vitals:  Vitals Value Taken Time   BP     Temp     Pulse     Resp     SpO2         Electronically Signed By: MARITZA Laura CRNA  February 13, 2023  12:22 PM

## 2023-02-13 NOTE — BRIEF OP NOTE
[facility] Brief Operative Note    Pre-operative diagnosis: Melanoma of conjunctiva   Post-operative diagnosis Same as preop   Procedure: Procedure(s):  wide local excision of right conjuntivia lesion  with amniotic membrane   Surgeon(s): Surgeon(s) and Role:     * Keven Gomez MD - Primary     * Denny Catalan MD - Resident - Assisting   Estimated blood loss: 1 mL    Specimens: ID Type Source Tests Collected by Time Destination   1 : Inferior Conjuntiva Tissue Other SURGICAL PATHOLOGY EXAM Keven Gomez MD 2/13/2023 11:30 AM    2 : Inframedial Tarsus Tissue Other SURGICAL PATHOLOGY EXAM Keven Gomez MD 2/13/2023 11:31 AM    3 : Inframedial Conjuntiva Tissue Other SURGICAL PATHOLOGY EXAM Keven Gomez MD 2/13/2023 11:32 AM       Findings: As expected    Keven Gomez MD

## 2023-02-13 NOTE — ANESTHESIA POSTPROCEDURE EVALUATION
Patient: Arthur Garcia    Procedure: Procedure(s):  wide local excision of right conjuntivia lesion  with amniotic membrane       Anesthesia Type:  General    Note:  Disposition: Outpatient   Postop Pain Control: Uneventful            Sign Out: Well controlled pain   PONV: No   Neuro/Psych: Uneventful            Sign Out: Acceptable/Baseline neuro status   Airway/Respiratory: Uneventful            Sign Out: Acceptable/Baseline resp. status   CV/Hemodynamics: Uneventful            Sign Out: Acceptable CV status; No obvious hypovolemia; No obvious fluid overload   Other NRE: NONE   DID A NON-ROUTINE EVENT OCCUR? No           Last vitals:  Vitals Value Taken Time   /86 02/13/23 1300   Temp 36.3  C (97.4  F) 02/13/23 1217   Pulse 68 02/13/23 1314   Resp 14 02/13/23 1217   SpO2 93 % 02/13/23 1316   Vitals shown include unvalidated device data.    Electronically Signed By: Cordelia Aguila MD  February 13, 2023  1:23 PM

## 2023-02-13 NOTE — ANESTHESIA PROCEDURE NOTES
Airway       Patient location during procedure: OR       Procedure Start/Stop Times: 2/13/2023 10:59 AM  Staff -        Anesthesiologist:  Mckayla Banuelos MD       Resident/Fellow: Matthew Tobin MD       Performed By: resident  Consent for Airway        Urgency: elective  Indications and Patient Condition       Indications for airway management: shell-procedural       Induction type:intravenous       Mask difficulty assessment: 1 - vent by mask    Final Airway Details       Final airway type: endotracheal airway       Successful airway: ETT - single  Endotracheal Airway Details        ETT size (mm): 7.5       Cuffed: yes       Successful intubation technique: direct laryngoscopy       DL Blade Type: MAC 4       Grade View of Cords: 2       Adjucts: stylet       Position: Right       Measured from: lips       Secured at (cm): 24       Bite block used: None    Post intubation assessment        Placement verified by: capnometry and equal breath sounds        Number of attempts at approach: 1       Number of other approaches attempted: 0       Secured with: pink tape       Ease of procedure: easy       Dentition: Intact and Unchanged    Medication(s) Administered   Medication Administration Time: 2/13/2023 10:59 AM

## 2023-02-13 NOTE — OP NOTE
PREOPERATIVE DIAGNOSES: Right conjunctival melanoma.   POSTOPERATIVE DIAGNOSES:  Right conjunctival melanoma  PROCEDURE: Right conjunctiva wide excision and reconstruction with Amnioguard membrane.  ANESTHESIA: General with local infiltration of 1% lidocaine with epinephrine.   SURGEON: Gail Bowles MD.  ASSISTANT:  Chaya Mane MD   COMPLICATIONS: None.   ESTIMATED BLOOD LOSS: Less than 5 mL.   SPECIMEN: Conjuntival melanoma for path  HISTORY AND INDICATIONS: Arthur Garcia presented with a  conjunctival lesion on the right inferior conjunctiva. Biopsy showed malignant melanoma.  After the risks, benefits and alternatives to the proposed procedure were explained, informed consent was obtained.   DESCRIPTION OF PROCEDURE: The patient was brought to the operating room and placed supine on the operating table. General anesthesia was given. Topical anesthetic was placed in both eyes. Subconjunctival infiltration of the local anesthetic was performed. The area  was prepped and draped in the typical sterile fashion for oculoplastic surgery. Attention was directed to right  Side. Lateral canthotomy and cantholysis was performed.  The lids were retracted. The area to be resected was outlined in surgical marker and excised using Ofelia scissors. Hemostasis was obtained with monopolar cautery. The specimen was sent fresh for pathologic workup. The Amnioguard was trimmed to fit the defect and sutured in place with 6-0 Vicryl sutures. 2 quilting sutures of 5-0 Vicryl were placed through the graft to exit on the skin surface. The lateral canthus was reapproximated with 5-0 Vicryl suture. The skin was closed with 6-0 plain gut sutures.  Antibiotic ophthalmic ointment was placed in the eye. The patient tolerated the procedure well. Arthur Garcia  left in stable condition.     GAIL BOWLES MD

## 2023-02-13 NOTE — DISCHARGE INSTRUCTIONS
Post-operative Instructions    Ophthalmic Plastic and Reconstructive Surgery  Keven Gomez M.D.  Chaya Mane M.D.    All instructions apply to the operated eye(s) or eyelid(s)    What to expect after surgery:  There will be some swelling, bruising, and likely a black eye (even into the lower eyelids and cheeks). Also expect crusting and discharge from the eye and/or incisions.   A small amount of surface bleeding is normal for the first 48 hours after surgery.  You may notice some bloody tears for the first few days after surgery. This is normal.  Your eye(s) and eyelid(s) may be painful and tender. This is normal after surgery. Use the pain medication as prescribed. If your pain does not improve despite the medication, contact the office.    Wound care and personal care:  Apply ice compresses 15 minutes on 15 minutes off while awake for the first 2 days after surgery, then switch to warm compresses 4 times a day until seen by your physician.   For warm packs you can place a cup of dry uncooked rice in a clean cotton sock. Place sock in microwave 30 seconds to one minute. Next place the warm sock into a plastic bag and wrap the bag with clean warm wet washcloth and place over operated eye.    You may shower or wash your hair the day after surgery. Do not bathe or go swimming for 1 week to prevent contamination of your wounds.  Do not apply make-up to the eyes or eyelids for 2 weeks after surgery.    Activity restrictions and driving:  Avoid heavy lifting, bending, exercise or strenuous activity for 1 week after surgery.  You may resume other activities and return to work as tolerated.  You may not resume driving until have you stopped using narcotic pain medications(such as Norco, Percocet, Tylenol #3).    Medications:  Restart all your regular home medications and eye drops today. If you take Plavix or Aspirin on a regular basis, wait for 3 days after your surgery before restarting these in order to  decrease the risk of bleeding complications.  Avoid aspirin and aspirin-like medications (Motrin, Aleve, Ibuprofen, [place] etc) for 5 days to reduce the risk of bleeding. You may take Tylenol (acetaminophen) for pain.  In addition to your home medications, take the following post-operative medications as prescribed by your physician:  Apply antibiotic ointment (erythromycin) to all sutures three times a day, and into the operated eye(s) at night.   Instill eye drops (Maxitrol) four times a day until the bottle finished.   Take scheduled extra strength Tylenol for pain.      WARNING: All the prescription pain medications listed above contain Tylenol (acetaminophen). You must not take more than 4,000 mg of acetaminophen per 24-hour period. This is equivalent to 6 tablets of Darvocet, 8 tablets of Vicodin, or 12 tablets of Norco, Percocet or Tylenol #3. If you take other over-the-counter medications containing acetaminophen, you must take the amount of acetaminophen into account and reduce the number of prescribed pain pills accordingly.    Contact information and follow-up:  Return to the Eye Clinic for a follow-up appointment with your physician as  scheduled. If no appointment has been scheduled, call [phone] for an  appointment with Dr. Gomez within 1 to 2 weeks from your date of surgery.  -     Please email a few photos of your eye(s) or other operative site(s) to [email or web address] prior to your follow up visit.  For severe pain, bleeding, or loss of vision, call the Eye Clinic at [phone].  After hours or on weekends and holidays, call [phone] and ask to speak with the ophthalmologist on call.    [facility]  Same-Day Surgery   Adult Discharge Orders & Instructions     For 24 hours after surgery    Get plenty of rest.  A responsible adult must stay with you for at least 24 hours after you leave the hospital.   Do not drive or use heavy equipment.   If you have weakness or tingling, don't drive or use heavy equipment until this feeling goes away.  Do not drink alcohol.  Avoid strenuous or risky activities.  Ask for help when climbing stairs.   You may feel lightheaded.  IF so, sit for a few minutes before standing.  Have someone help you get up.   If you have nausea (feel sick to your stomach): Drink only clear liquids such as apple juice, ginger ale, broth or 7-Up.  Rest may also help.  Be sure to drink enough fluids.  Move to a regular diet as you feel able.  You may have a slight fever. Call the doctor if your fever is over 100 F (37.7 C) (taken under the tongue) or lasts longer than 24 hours.  You may have a dry mouth, a sore throat, muscle aches or trouble sleeping.  These should go away after 24 hours.  Do not make important or legal decisions.   Call your doctor for any of the followin.  Signs of infection (fever, growing tenderness at the surgery site, a large amount of drainage or bleeding, severe pain, foul-smelling drainage, redness, swelling).    2. It has been over 8 to 10 hours since surgery and you are still not able to urinate (pass water).    3.  Headache for over 24 hours.

## 2023-02-13 NOTE — INTERVAL H&P NOTE
"I have reviewed the surgical (or preoperative) H&P that is linked to this encounter, and examined the patient. There are no significant changes    Clinical Conditions Present on Arrival:  Clinically Significant Risk Factors Present on Admission                  # Severe Obesity: Estimated body mass index is 47.92 kg/m  as calculated from the following:    Height as of this encounter: 1.753 m (5' 9\").    Weight as of this encounter: 147.2 kg (324 lb 8.3 oz).       "

## 2023-02-15 NOTE — TUMOR CONFERENCE
Head & Neck Tumor Conference Note   Status: New  Staff: Dr. Warner     Tumor Site: right conjunctiva   Tumor Pathology: melanoma  Tumor Stage: pT4b  Tumor Treatment:   2/13/2023:right conjunctiva wide excision and reconstruction with Amnioguard membrane (Jason).    Reason for Review: Review imaging, path, and POC    Brief History: This is a 77 year old male with recently diagnosed malignant melanoma at the inferior vertex of his sclerae and conjunctiva on the right side.  The melanoma was biopsied and revealed ulceration with a depth of 4.1 mm in the biopsy.  The patient has had several melanomas in the past including some on the skin on the left side of his eye.  He has not noticed any lumps or bumps in his neck and actually feels a little better now that Dr. Gomez has removed part of it.  He has prior biopsy. He underwent lymphoscintigraphy for lymph node mapping prior to WLE and lymph node biopsy, however initial read did not demonstrate any maryam drainage. This was reviewed with the patient and he underwent WLE and reconstruction with ophthalmology on 2/13/2022. Here to review formal results of lymphoscintigraphy and POC.     Pertinent PMH:   Past Medical History:   Diagnosis Date     Arthritis      Depression      Disorder of lipoprotein and lipid metabolism      Gout      Hypertension       Smoking Hx:   Social History     Tobacco Use     Smoking status: Never     Smokeless tobacco: Never   Substance Use Topics     Alcohol use: No     Drug use: No       Imaging:   Lymphoscintigraphy 2/13/2023  SPECT-CT images show somewhat asymmetric mild uptake in the right parotid tail. There is a corresponding normal size lymph node. This might possibly represent the sentinel node (series 3 image 115). cells are clear. No mass within the neck. The lungs are grossly unremarkable.                                                                  Impression: In this patient with right lower eyelid melanoma, mild  asymmetric uptake in the right parotid tail and corresponding to normal-sized node with a fatty hilum, only seen on SPECT CT. There might possibly represent the sentinel node.     Pathology   2/13/2023   A. Inferior conjunctiva, right, wide local excision: Invasive conjunctival melanoma, arising from high-grade conjunctival intraepithelial melanocytic lesion, incompletely excised.      B. Inframedial tarsus, right, biopsy: Invasive conjunctival melanoma, incompletely excised.      C. Inframedial conjunctiva, right, biopsy: Invasive conjunctival melanoma, incompletely excised.     Tumor Board Recommendation:   Reviewed surgical pathology demonstrating tumor at multiple margins. Will require re-resection and can proceed with sentinel node biopsy at that time given parotid lymph node seen on lymphoscintigraphy.      - Dr. Warner will discuss POC with Dr. Jason Catalan MD  Otolaryngology Head and Neck Surgery Resident, PGY-3    Documentation / Disclaimer Cancer Tumor Board Note: Cancer tumor board recommendations do not override what is determined to be reasonable care and treatment, which is dependent on the circumstances of a patient's case; the patient's medical, social, and personal concerns; and the clinical judgment of the oncologist [physician].

## 2023-02-17 ENCOUNTER — TUMOR CONFERENCE (OUTPATIENT)
Dept: ONCOLOGY | Facility: CLINIC | Age: 78
End: 2023-02-17

## 2023-02-17 LAB
PATH REPORT.COMMENTS IMP SPEC: ABNORMAL
PATH REPORT.COMMENTS IMP SPEC: ABNORMAL
PATH REPORT.COMMENTS IMP SPEC: YES
PATH REPORT.FINAL DX SPEC: ABNORMAL
PATH REPORT.GROSS SPEC: ABNORMAL
PATH REPORT.MICROSCOPIC SPEC OTHER STN: ABNORMAL
PATH REPORT.RELEVANT HX SPEC: ABNORMAL
PHOTO IMAGE: ABNORMAL

## 2023-02-20 ENCOUNTER — TELEPHONE (OUTPATIENT)
Dept: OTOLARYNGOLOGY | Facility: CLINIC | Age: 78
End: 2023-02-20

## 2023-02-20 NOTE — TELEPHONE ENCOUNTER
[facility] Call Center    Phone Message    May a detailed message be left on voicemail: yes     Reason for Call: Other: Pt calling in regards to wanting to know if his appt on 2/22 can be switched to an phone visit due to weather . Please reachout to pt to discuss. thank you      Action Taken: Message routed to:  [facility] ENT     Travel Screening: Not Applicable

## 2023-02-22 NOTE — TELEPHONE ENCOUNTER
Spoke to patient after discussing with RN. Appointment switched to phone visit. Pt appreciative of call.

## 2023-02-27 DIAGNOSIS — C69.01 MALIGNANT MELANOMA OF CONJUNCTIVA, RIGHT (H): Primary | ICD-10-CM

## 2023-03-02 ENCOUNTER — TELEPHONE (OUTPATIENT)
Dept: OTOLARYNGOLOGY | Facility: CLINIC | Age: 78
End: 2023-03-02

## 2023-03-02 NOTE — TELEPHONE ENCOUNTER
[facility] Call Center    Phone Message    May a detailed message be left on voicemail: yes     Reason for Call: Appointment Intake    Patient is looking to schedule eye/lymph node surgery.  Patient wanted to inform care team that he has knee injections on 03/08/2023, 03/15/2023.  Please advise.  Thank you!    Action Taken: Message routed to:  [facility] ENT    Travel Screening: Not Applicable

## 2023-03-13 DIAGNOSIS — C43.112 MALIGNANT MELANOMA OF RIGHT LOWER EYELID INCLUDING CANTHUS (H): Primary | ICD-10-CM

## 2023-03-15 PROBLEM — C69.01: Status: ACTIVE | Noted: 2023-03-15

## 2023-03-16 ENCOUNTER — TELEPHONE (OUTPATIENT)
Dept: OPHTHALMOLOGY | Facility: CLINIC | Age: 78
End: 2023-03-16

## 2023-03-16 NOTE — TELEPHONE ENCOUNTER
I called Arthur to give him the details for the tentative plan for his surgery on 4/5/23. I let him know that I will be calling him next week to give him all the details when everything is set.

## 2023-03-30 ENCOUNTER — TELEPHONE (OUTPATIENT)
Dept: OTOLARYNGOLOGY | Facility: CLINIC | Age: 78
End: 2023-03-30

## 2023-03-30 NOTE — TELEPHONE ENCOUNTER
[facility] Call Center    Phone Message    May a detailed message be left on voicemail: yes     Reason for Call: Other: Anna from [facility] called with questions about what is needed from them to be filled out from order sent to them for biospy. She requests a call back as soon as possible. Thank you!     Action Taken: Message routed to:  [facility] ENT    Travel Screening: Not Applicable

## 2023-03-31 NOTE — TELEPHONE ENCOUNTER
Returned call and left message for [facility] at [facility] with direct line for return call to discuss needs for pre-op.       Anna Fraser, RN, BSN

## 2023-04-04 ENCOUNTER — PATIENT OUTREACH (OUTPATIENT)
Dept: OTOLARYNGOLOGY | Facility: CLINIC | Age: 78
End: 2023-04-04

## 2023-04-04 NOTE — TELEPHONE ENCOUNTER
Called and spoke with Nuc Med tech with update that Dr. Warner and Dr. Gomez are requesting the nuc med tech to inject into lower eyelid tomorrow. Nuc med tech agreeable. Updated Dr. Warner.       Anna Fraser, RN, BSN

## 2023-04-05 ENCOUNTER — ANESTHESIA EVENT (OUTPATIENT)
Dept: SURGERY | Facility: CLINIC | Age: 78
End: 2023-04-05
Payer: COMMERCIAL

## 2023-04-05 ENCOUNTER — HOSPITAL ENCOUNTER (OUTPATIENT)
Dept: NUCLEAR MEDICINE | Facility: CLINIC | Age: 78
Setting detail: NUCLEAR MEDICINE
Discharge: HOME | End: 2023-04-05
Attending: OTOLARYNGOLOGY
Payer: COMMERCIAL

## 2023-04-05 ENCOUNTER — ANESTHESIA (OUTPATIENT)
Dept: SURGERY | Facility: CLINIC | Age: 78
End: 2023-04-05
Payer: COMMERCIAL

## 2023-04-05 ENCOUNTER — HOSPITAL ENCOUNTER (OUTPATIENT)
Facility: CLINIC | Age: 78
Discharge: HOME | End: 2023-04-05
Attending: OPHTHALMOLOGY | Admitting: OPHTHALMOLOGY
Payer: COMMERCIAL

## 2023-04-05 VITALS
DIASTOLIC BLOOD PRESSURE: 82 MMHG | HEIGHT: 69 IN | BODY MASS INDEX: 46.65 KG/M2 | TEMPERATURE: 98.4 F | SYSTOLIC BLOOD PRESSURE: 142 MMHG | RESPIRATION RATE: 15 BRPM | OXYGEN SATURATION: 95 % | WEIGHT: 315 LBS | HEART RATE: 72 BPM

## 2023-04-05 DIAGNOSIS — C69.01 MALIGNANT MELANOMA OF CONJUNCTIVA, RIGHT (H): Primary | ICD-10-CM

## 2023-04-05 DIAGNOSIS — C43.112 MALIGNANT MELANOMA OF RIGHT LOWER EYELID INCLUDING CANTHUS (H): ICD-10-CM

## 2023-04-05 PROCEDURE — 38510 BIOPSY/REMOVAL LYMPH NODES: CPT | Mod: RT | Performed by: OTOLARYNGOLOGY

## 2023-04-05 PROCEDURE — A9520 TC99 TILMANOCEPT DIAG 0.5MCI: HCPCS | Performed by: OTOLARYNGOLOGY

## 2023-04-05 PROCEDURE — 78195 LYMPH SYSTEM IMAGING: CPT | Mod: 26 | Performed by: RADIOLOGY

## 2023-04-05 PROCEDURE — 88305 TISSUE EXAM BY PATHOLOGIST: CPT | Mod: TC | Performed by: OPHTHALMOLOGY

## 2023-04-05 PROCEDURE — 999N000141 HC STATISTIC PRE-PROCEDURE NURSING ASSESSMENT: Performed by: OPHTHALMOLOGY

## 2023-04-05 PROCEDURE — 250N000009 HC RX 250: Performed by: ANESTHESIOLOGY

## 2023-04-05 PROCEDURE — 272N000001 HC OR GENERAL SUPPLY STERILE: Performed by: OPHTHALMOLOGY

## 2023-04-05 PROCEDURE — 710N000012 HC RECOVERY PHASE 2, PER MINUTE: Performed by: OPHTHALMOLOGY

## 2023-04-05 PROCEDURE — 360N000076 HC SURGERY LEVEL 3, PER MIN: Performed by: OPHTHALMOLOGY

## 2023-04-05 PROCEDURE — 250N000009 HC RX 250: Performed by: OPHTHALMOLOGY

## 2023-04-05 PROCEDURE — 258N000003 HC RX IP 258 OP 636: Performed by: OTOLARYNGOLOGY

## 2023-04-05 PROCEDURE — 88341 IMHCHEM/IMCYTCHM EA ADD ANTB: CPT | Mod: 26 | Performed by: DERMATOLOGY

## 2023-04-05 PROCEDURE — 250N000011 HC RX IP 250 OP 636: Performed by: OTOLARYNGOLOGY

## 2023-04-05 PROCEDURE — 78195 LYMPH SYSTEM IMAGING: CPT

## 2023-04-05 PROCEDURE — 272N000002 HC OR SUPPLY OTHER OPNP: Performed by: OPHTHALMOLOGY

## 2023-04-05 PROCEDURE — 710N000010 HC RECOVERY PHASE 1, LEVEL 2, PER MIN: Performed by: OPHTHALMOLOGY

## 2023-04-05 PROCEDURE — 11644 EXC F/E/E/N/L MAL+MRG 3.1-4: CPT | Mod: GC | Performed by: OPHTHALMOLOGY

## 2023-04-05 PROCEDURE — 343N000001 HC RX 343: Performed by: OTOLARYNGOLOGY

## 2023-04-05 PROCEDURE — 38900 IO MAP OF SENT LYMPH NODE: CPT | Mod: GC | Performed by: OTOLARYNGOLOGY

## 2023-04-05 PROCEDURE — 88307 TISSUE EXAM BY PATHOLOGIST: CPT | Mod: 26 | Performed by: DERMATOLOGY

## 2023-04-05 PROCEDURE — 250N000011 HC RX IP 250 OP 636: Performed by: ANESTHESIOLOGY

## 2023-04-05 PROCEDURE — 370N000017 HC ANESTHESIA TECHNICAL FEE, PER MIN: Performed by: OPHTHALMOLOGY

## 2023-04-05 PROCEDURE — 88307 TISSUE EXAM BY PATHOLOGIST: CPT | Mod: TC | Performed by: OPHTHALMOLOGY

## 2023-04-05 PROCEDURE — 258N000003 HC RX IP 258 OP 636: Performed by: ANESTHESIOLOGY

## 2023-04-05 PROCEDURE — 67875 CLOSURE OF EYELID BY SUTURE: CPT | Mod: RT | Performed by: OPHTHALMOLOGY

## 2023-04-05 PROCEDURE — 250N000025 HC SEVOFLURANE, PER MIN: Performed by: OPHTHALMOLOGY

## 2023-04-05 PROCEDURE — 88342 IMHCHEM/IMCYTCHM 1ST ANTB: CPT | Mod: 26 | Performed by: DERMATOLOGY

## 2023-04-05 PROCEDURE — 88305 TISSUE EXAM BY PATHOLOGIST: CPT | Mod: 26 | Performed by: DERMATOLOGY

## 2023-04-05 RX ORDER — CEFAZOLIN SODIUM/WATER 3 G/30 ML
3 SYRINGE (ML) INTRAVENOUS
Status: DISCONTINUED | OUTPATIENT
Start: 2023-04-05 | End: 2023-04-05 | Stop reason: HOSPADM

## 2023-04-05 RX ORDER — HYDROMORPHONE HCL IN WATER/PF 6 MG/30 ML
0.4 PATIENT CONTROLLED ANALGESIA SYRINGE INTRAVENOUS EVERY 5 MIN PRN
Status: DISCONTINUED | OUTPATIENT
Start: 2023-04-05 | End: 2023-04-05 | Stop reason: HOSPADM

## 2023-04-05 RX ORDER — FENTANYL CITRATE 50 UG/ML
25 INJECTION, SOLUTION INTRAMUSCULAR; INTRAVENOUS EVERY 5 MIN PRN
Status: DISCONTINUED | OUTPATIENT
Start: 2023-04-05 | End: 2023-04-05 | Stop reason: HOSPADM

## 2023-04-05 RX ORDER — ERYTHROMYCIN 5 MG/G
OINTMENT OPHTHALMIC PRN
Status: DISCONTINUED | OUTPATIENT
Start: 2023-04-05 | End: 2023-04-05 | Stop reason: HOSPADM

## 2023-04-05 RX ORDER — CEPHALEXIN 500 MG/1
500 CAPSULE ORAL 2 TIMES DAILY
Qty: 20 CAPSULE | Refills: 0 | Status: SHIPPED | OUTPATIENT
Start: 2023-04-05 | End: 2023-04-05

## 2023-04-05 RX ORDER — CEFAZOLIN SODIUM/WATER 3 G/30 ML
3 SYRINGE (ML) INTRAVENOUS SEE ADMIN INSTRUCTIONS
Status: DISCONTINUED | OUTPATIENT
Start: 2023-04-05 | End: 2023-04-05 | Stop reason: HOSPADM

## 2023-04-05 RX ORDER — LABETALOL HYDROCHLORIDE 5 MG/ML
10 INJECTION, SOLUTION INTRAVENOUS
Status: DISCONTINUED | OUTPATIENT
Start: 2023-04-05 | End: 2023-04-05 | Stop reason: HOSPADM

## 2023-04-05 RX ORDER — FENTANYL CITRATE 50 UG/ML
INJECTION, SOLUTION INTRAMUSCULAR; INTRAVENOUS PRN
Status: DISCONTINUED | OUTPATIENT
Start: 2023-04-05 | End: 2023-04-05

## 2023-04-05 RX ORDER — DEXAMETHASONE SODIUM PHOSPHATE 4 MG/ML
INJECTION, SOLUTION INTRA-ARTICULAR; INTRALESIONAL; INTRAMUSCULAR; INTRAVENOUS; SOFT TISSUE PRN
Status: DISCONTINUED | OUTPATIENT
Start: 2023-04-05 | End: 2023-04-05

## 2023-04-05 RX ORDER — ACETAMINOPHEN 325 MG/1
650 TABLET ORAL
Status: DISCONTINUED | OUTPATIENT
Start: 2023-04-05 | End: 2023-04-05 | Stop reason: HOSPADM

## 2023-04-05 RX ORDER — OXYCODONE HYDROCHLORIDE 10 MG/1
10 TABLET ORAL
Status: DISCONTINUED | OUTPATIENT
Start: 2023-04-05 | End: 2023-04-05 | Stop reason: HOSPADM

## 2023-04-05 RX ORDER — PROPOFOL 10 MG/ML
INJECTION, EMULSION INTRAVENOUS PRN
Status: DISCONTINUED | OUTPATIENT
Start: 2023-04-05 | End: 2023-04-05

## 2023-04-05 RX ORDER — GLYCOPYRROLATE 0.2 MG/ML
INJECTION, SOLUTION INTRAMUSCULAR; INTRAVENOUS PRN
Status: DISCONTINUED | OUTPATIENT
Start: 2023-04-05 | End: 2023-04-05

## 2023-04-05 RX ORDER — LIDOCAINE HYDROCHLORIDE AND EPINEPHRINE 10; 10 MG/ML; UG/ML
INJECTION, SOLUTION INFILTRATION; PERINEURAL PRN
Status: DISCONTINUED | OUTPATIENT
Start: 2023-04-05 | End: 2023-04-05 | Stop reason: HOSPADM

## 2023-04-05 RX ORDER — CEPHALEXIN 500 MG/1
500 CAPSULE ORAL 2 TIMES DAILY
Qty: 20 CAPSULE | Refills: 0 | Status: SHIPPED | OUTPATIENT
Start: 2023-04-05 | End: 2023-04-15

## 2023-04-05 RX ORDER — HYDROMORPHONE HCL IN WATER/PF 6 MG/30 ML
0.2 PATIENT CONTROLLED ANALGESIA SYRINGE INTRAVENOUS EVERY 5 MIN PRN
Status: DISCONTINUED | OUTPATIENT
Start: 2023-04-05 | End: 2023-04-05 | Stop reason: HOSPADM

## 2023-04-05 RX ORDER — OXYCODONE HYDROCHLORIDE 5 MG/1
5 TABLET ORAL
Status: DISCONTINUED | OUTPATIENT
Start: 2023-04-05 | End: 2023-04-05 | Stop reason: HOSPADM

## 2023-04-05 RX ORDER — SODIUM CHLORIDE, SODIUM LACTATE, POTASSIUM CHLORIDE, CALCIUM CHLORIDE 600; 310; 30; 20 MG/100ML; MG/100ML; MG/100ML; MG/100ML
INJECTION, SOLUTION INTRAVENOUS CONTINUOUS
Status: DISCONTINUED | OUTPATIENT
Start: 2023-04-05 | End: 2023-04-05 | Stop reason: HOSPADM

## 2023-04-05 RX ORDER — FENTANYL CITRATE 50 UG/ML
50 INJECTION, SOLUTION INTRAMUSCULAR; INTRAVENOUS EVERY 5 MIN PRN
Status: DISCONTINUED | OUTPATIENT
Start: 2023-04-05 | End: 2023-04-05 | Stop reason: HOSPADM

## 2023-04-05 RX ORDER — ONDANSETRON 2 MG/ML
4 INJECTION INTRAMUSCULAR; INTRAVENOUS EVERY 30 MIN PRN
Status: DISCONTINUED | OUTPATIENT
Start: 2023-04-05 | End: 2023-04-05 | Stop reason: HOSPADM

## 2023-04-05 RX ORDER — LIDOCAINE HYDROCHLORIDE 20 MG/ML
INJECTION, SOLUTION INFILTRATION; PERINEURAL PRN
Status: DISCONTINUED | OUTPATIENT
Start: 2023-04-05 | End: 2023-04-05

## 2023-04-05 RX ORDER — ERYTHROMYCIN 5 MG/G
OINTMENT OPHTHALMIC
Qty: 3.5 G | Refills: 0 | Status: SHIPPED | OUTPATIENT
Start: 2023-04-05 | End: 2023-04-05

## 2023-04-05 RX ORDER — ONDANSETRON 4 MG/1
4 TABLET, ORALLY DISINTEGRATING ORAL EVERY 30 MIN PRN
Status: DISCONTINUED | OUTPATIENT
Start: 2023-04-05 | End: 2023-04-05 | Stop reason: HOSPADM

## 2023-04-05 RX ORDER — CEFAZOLIN SODIUM 1 G/3ML
INJECTION, POWDER, FOR SOLUTION INTRAMUSCULAR; INTRAVENOUS PRN
Status: DISCONTINUED | OUTPATIENT
Start: 2023-04-05 | End: 2023-04-05

## 2023-04-05 RX ORDER — SODIUM CHLORIDE, SODIUM GLUCONATE, SODIUM ACETATE, POTASSIUM CHLORIDE AND MAGNESIUM CHLORIDE 526; 502; 368; 37; 30 MG/100ML; MG/100ML; MG/100ML; MG/100ML; MG/100ML
INJECTION, SOLUTION INTRAVENOUS CONTINUOUS PRN
Status: DISCONTINUED | OUTPATIENT
Start: 2023-04-05 | End: 2023-04-05

## 2023-04-05 RX ORDER — ACETAMINOPHEN 325 MG/1
650 TABLET ORAL EVERY 4 HOURS PRN
Qty: 50 TABLET | Refills: 0 | Status: SHIPPED | OUTPATIENT
Start: 2023-04-05

## 2023-04-05 RX ORDER — ONDANSETRON 2 MG/ML
INJECTION INTRAMUSCULAR; INTRAVENOUS PRN
Status: DISCONTINUED | OUTPATIENT
Start: 2023-04-05 | End: 2023-04-05

## 2023-04-05 RX ADMIN — CEFAZOLIN 3 G: 1 INJECTION, POWDER, FOR SOLUTION INTRAMUSCULAR; INTRAVENOUS at 10:35

## 2023-04-05 RX ADMIN — FENTANYL CITRATE 50 MCG: 50 INJECTION, SOLUTION INTRAMUSCULAR; INTRAVENOUS at 11:10

## 2023-04-05 RX ADMIN — TILMANOCEPT 0.5 MILLICURIE: KIT at 07:50

## 2023-04-05 RX ADMIN — PHENYLEPHRINE HYDROCHLORIDE 200 MCG: 10 INJECTION INTRAVENOUS at 10:42

## 2023-04-05 RX ADMIN — LIDOCAINE HYDROCHLORIDE 100 MG: 20 INJECTION, SOLUTION INFILTRATION; PERINEURAL at 10:09

## 2023-04-05 RX ADMIN — PROPOFOL 200 MG: 10 INJECTION, EMULSION INTRAVENOUS at 10:09

## 2023-04-05 RX ADMIN — SUCCINYLCHOLINE CHLORIDE 160 MG: 20 INJECTION, SOLUTION INTRAMUSCULAR; INTRAVENOUS; PARENTERAL at 10:10

## 2023-04-05 RX ADMIN — PROPOFOL 50 MG: 10 INJECTION, EMULSION INTRAVENOUS at 10:22

## 2023-04-05 RX ADMIN — FENTANYL CITRATE 50 MCG: 50 INJECTION, SOLUTION INTRAMUSCULAR; INTRAVENOUS at 11:37

## 2023-04-05 RX ADMIN — SODIUM CHLORIDE, SODIUM GLUCONATE, SODIUM ACETATE, POTASSIUM CHLORIDE AND MAGNESIUM CHLORIDE: 526; 502; 368; 37; 30 INJECTION, SOLUTION INTRAVENOUS at 10:00

## 2023-04-05 RX ADMIN — PHENYLEPHRINE HYDROCHLORIDE 100 MCG: 10 INJECTION INTRAVENOUS at 10:39

## 2023-04-05 RX ADMIN — FENTANYL CITRATE 50 MCG: 50 INJECTION, SOLUTION INTRAMUSCULAR; INTRAVENOUS at 12:53

## 2023-04-05 RX ADMIN — FENTANYL CITRATE 50 MCG: 50 INJECTION, SOLUTION INTRAMUSCULAR; INTRAVENOUS at 10:09

## 2023-04-05 RX ADMIN — FENTANYL CITRATE 50 MCG: 50 INJECTION, SOLUTION INTRAMUSCULAR; INTRAVENOUS at 10:18

## 2023-04-05 RX ADMIN — GLYCOPYRROLATE 0.2 MG: 0.2 INJECTION, SOLUTION INTRAMUSCULAR; INTRAVENOUS at 10:17

## 2023-04-05 RX ADMIN — DEXAMETHASONE SODIUM PHOSPHATE 4 MG: 4 INJECTION, SOLUTION INTRA-ARTICULAR; INTRALESIONAL; INTRAMUSCULAR; INTRAVENOUS; SOFT TISSUE at 10:36

## 2023-04-05 RX ADMIN — HYDROMORPHONE HYDROCHLORIDE 0.5 MG: 1 INJECTION, SOLUTION INTRAMUSCULAR; INTRAVENOUS; SUBCUTANEOUS at 10:50

## 2023-04-05 RX ADMIN — PROPOFOL 50 MG: 10 INJECTION, EMULSION INTRAVENOUS at 11:06

## 2023-04-05 RX ADMIN — ONDANSETRON 4 MG: 2 INJECTION INTRAMUSCULAR; INTRAVENOUS at 11:41

## 2023-04-05 RX ADMIN — FENTANYL CITRATE 50 MCG: 50 INJECTION, SOLUTION INTRAMUSCULAR; INTRAVENOUS at 12:32

## 2023-04-05 RX ADMIN — PHENYLEPHRINE HYDROCHLORIDE 100 MCG: 10 INJECTION INTRAVENOUS at 11:47

## 2023-04-05 ASSESSMENT — ACTIVITIES OF DAILY LIVING (ADL)
ADLS_ACUITY_SCORE: 25
ADLS_ACUITY_SCORE: 25
ADLS_ACUITY_SCORE: 38
ADLS_ACUITY_SCORE: 25
ADLS_ACUITY_SCORE: 25

## 2023-04-05 ASSESSMENT — COPD QUESTIONNAIRES: COPD: 1

## 2023-04-05 NOTE — INTERVAL H&P NOTE
"I have reviewed the surgical (or preoperative) H&P that is linked to this encounter, and examined the patient. There are no significant changes    Clinical Conditions Present on Arrival:  Clinically Significant Risk Factors Present on Admission                    # Severe Obesity: Estimated body mass index is 50.67 kg/m  as calculated from the following:    Height as of this encounter: 1.753 m (5' 9.02\").    Weight as of this encounter: 155.7 kg (343 lb 4.1 oz).       "

## 2023-04-05 NOTE — ANESTHESIA POSTPROCEDURE EVALUATION
Patient: Arthur Garcia    Procedure: Procedure(s):  Wide Excision of Right Lower eyelid melanoma  SENTINAL LYMPH  NODE BIOPSY       Anesthesia Type:  General    Note:  Disposition: Outpatient   Postop Pain Control: Uneventful            Sign Out: Well controlled pain   PONV: No   Neuro/Psych: Uneventful            Sign Out: Acceptable/Baseline neuro status   Airway/Respiratory: Uneventful            Sign Out: Acceptable/Baseline resp. status   CV/Hemodynamics: Uneventful            Sign Out: Acceptable CV status; No obvious hypovolemia; No obvious fluid overload   Other NRE: NONE   DID A NON-ROUTINE EVENT OCCUR?            Last vitals:  Vitals Value Taken Time   /86 04/05/23 1320   Temp 36.8  C (98.3  F) 04/05/23 1212   Pulse 73 04/05/23 1331   Resp 12 04/05/23 1331   SpO2 95 % 04/05/23 1331   Vitals shown include unvalidated device data.    Electronically Signed By: Uriah Roper Junior, MD  April 5, 2023  1:32 PM

## 2023-04-05 NOTE — DISCHARGE INSTRUCTIONS
Post-operative Instructions    Ophthalmic Plastic and Reconstructive Surgery  Keven Gomez M.D.  Chaya Mane M.D.    All instructions apply to the operated eye(s) or eyelid(s)      What to expect after surgery:  There will be some swelling, bruising, and likely a black eye (even into the lower eyelids and cheeks). Also expect crusting and discharge from the eye and/or incisions.   A small amount of surface bleeding is normal for the first 48 hours after surgery.  You may notice some bloody tears for the first few days after surgery. This is normal.  Your eye(s) and eyelid(s) may be painful and tender. This is normal after surgery. Use the pain medication as prescribed. If your pain does not improve despite the medication, contact the office.    Wound care and personal care:  Apply ice compresses 15 minutes on 15 minutes off while awake for the first 2 days after surgery, then switch to warm compresses 4 times a day until seen by your physician.   For warm packs you can place a cup of dry uncooked rice in a clean cotton sock. Place sock in microwave 30 seconds to one minute. Next place the warm sock into a plastic bag and wrap the bag with clean warm wet washcloth and place over operated eye.    You may shower or wash your hair the day after surgery. Do not bathe or go swimming for 1 week to prevent contamination of your wounds.  Do not apply make-up to the eyes or eyelids for 2 weeks after surgery.    Activity restrictions and driving:  Avoid heavy lifting, bending, exercise or strenuous activity for 1 week after surgery.  You may resume other activities and return to work as tolerated.  You may not resume driving until have you stopped using narcotic pain medications(such as Norco, Percocet, Tylenol #3).    Medications:  Restart all your regular home medications and eye drops today. If you take Plavix or Aspirin on a regular basis, wait for 3 days after your surgery before restarting these in order to  decrease the risk of bleeding complications.  Avoid aspirin and aspirin-like medications (Motrin, Aleve, Ibuprofen, Mandi[place] etc) for 5 days to reduce the risk of bleeding. You may take Tylenol (acetaminophen) for pain.  In addition to your home medications, take the following post-operative medications as prescribed by your physician:  Apply antibiotic ointment (erythromycin) to all sutures three times a day, and into the operated eye(s) at night.   Antibiotic - take as directed   Take scheduled extra strength Tylenol for pain.  You may take 1 to 2 pain pills (norco or oxycodone as prescribed) as needed for breakthrough pain up to every 6 hours.  The pain pills may make you drowsy. You must not drive a car, operate heavy machinery or drink alcohol while taking them.  The pain pills may cause constipation and nausea. Take them with some food to prevent a stomach upset. If you continue to experience nausea, call your physician.    WARNING: All the prescription pain medications listed above contain Tylenol (acetaminophen). You must not take more than 4,000 mg of acetaminophen per 24-hour period. This is equivalent to 6 tablets of Darvocet, 8 tablets of Vicodin, or 12 tablets of Norco, Percocet or Tylenol #3. If you take other over-the-counter medications containing acetaminophen, you must take the amount of acetaminophen into account and reduce the number of prescribed pain pills accordingly.    Contact information and follow-up:  Return to the Eye Clinic for a follow-up appointment with your physician as  scheduled. If no appointment has been scheduled, call [phone] for an  appointment with Dr. Gomez within 1 to 2 weeks from your date of surgery.  -     Please email a few photos of your eye(s) or other operative site(s) to [email or web address] prior to your follow up visit.    For severe pain, bleeding, or loss of vision, call the Eye Clinic at [phone].  After hours or on weekends and holidays,  call [phone] and ask to speak with the ophthalmologist on call.    [facility], [place]  Same-Day Surgery   Adult Discharge Orders & Instructions     For 24 hours after surgery    Get plenty of rest.  A responsible adult must stay with you for at least 24 hours after you leave the hospital.   Do not drive or use heavy equipment.  If you have weakness or tingling, don't drive or use heavy equipment until this feeling goes away.  Do not drink alcohol.  Avoid strenuous or risky activities.  Ask for help when climbing stairs.   You may feel lightheaded.  IF so, sit for a few minutes before standing.  Have someone help you get up.   If you have nausea (feel sick to your stomach): Drink only clear liquids such as apple juice, ginger ale, broth or 7-Up.  Rest may also help.  Be sure to drink enough fluids.  Move to a regular diet as you feel able.  You may have a slight fever. Call the doctor if your fever is over 100 F (37.7 C) (taken under the tongue) or lasts longer than 24 hours.  You may have a dry mouth, a sore throat, muscle aches or trouble sleeping.  These should go away after 24 hours.  Do not make important or legal decisions.   Call your doctor for any of the followin.  Signs of infection (fever, growing tenderness at the surgery site, a large amount of drainage or bleeding, severe pain, foul-smelling drainage, redness, swelling).    2. It has been over 8 to 10 hours since surgery and you are still not able to urinate (pass water).    3.  Headache for over 24 hours.    To contact a doctor, call Dr Gomez's office at [phone]--Opthamology Clinic or [phone] [facility]    or: [phone] and ask for the resident on call for Opthamology (answered 24 hours a day)  '   Emergency Department:    [facility]: [phone]       (TTY for hearing impaired: [phone])    [facility]: [phone]       (TTY for hearing impaired: [phone])

## 2023-04-05 NOTE — ANESTHESIA PROCEDURE NOTES
Airway       Patient location during procedure: OR       Procedure Start/Stop Times: 4/5/2023 10:12 AM  Staff -        CRNA: Rico Arreaga APRN CRNA       Performed By: CRNA  Consent for Airway        Urgency: elective  Indications and Patient Condition       Indications for airway management: shell-procedural       Induction type:intravenous       Mask difficulty assessment: 2 - vent by mask + OA or adjuvant +/- NMBA    Final Airway Details       Final airway type: endotracheal airway       Successful airway: ETT - single  Endotracheal Airway Details        ETT size (mm): 8.0       Cuffed: yes       Successful intubation technique: video laryngoscopy       VL Blade Size: Glidescope 4       Grade View of Cords: 1       Adjucts: stylet       Position: Right       Measured from: gums/teeth       Secured at (cm): 23       Bite block used: None    Post intubation assessment        Placement verified by: capnometry, equal breath sounds and chest rise        Number of attempts at approach: 1       Number of other approaches attempted: 0       Secured with: pink tape       Ease of procedure: easy       Dentition: Intact and Unchanged    Medication(s) Administered   Medication Administration Time: 4/5/2023 10:12 AM

## 2023-04-05 NOTE — ANESTHESIA PREPROCEDURE EVALUATION
Anesthesia Pre-Procedure Evaluation    Patient: Arthur Garcia   MRN: [id number] : 1945        Procedure : Procedure(s):  Wide Excision of Right Lower eyelid melanoma and orbit biopsy  possible BIOPSY, LYMPH NODE, SENTINEL          Past Medical History:   Diagnosis Date     Arthritis      Depression      Disorder of lipoprotein and lipid metabolism      Gout      Hypertension       Past Surgical History:   Procedure Laterality Date     APPENDECTOMY  2000     CHOLECYSTECTOMY   or      EXCISE LESION CONJUNCTIVAL Right 2023    Procedure: wide local excision of right conjuntivia lesion;  Surgeon: Keven Gomez MD;  Location: [facility] OR     PLACEMENT, AMNIOTIC MEMBRANE GRAFT Right 2023    Procedure: PLACEMENT, AMNIOTIC MEMBRANE GRAFT;  Surgeon: Keven Gomez MD;  Location: [facility] OR     NC TRABECULOPLASTY BY LASER SURGERY        Allergies   Allergen Reactions     Atorvastatin Muscle Pain (Myalgia)     Codeine Hives     Simvastatin Muscle Pain (Myalgia)     Other reaction(s): Muscle pain, Joint pain     Propoxyphene Rash and Swelling     Skin Adhesives Rash     Severely allergic to Coban - causing severe itching      Social History     Tobacco Use     Smoking status: Never     Smokeless tobacco: Never   Vaping Use     Vaping status: Not on file   Substance Use Topics     Alcohol use: No      Wt Readings from Last 1 Encounters:   23 (!) 155.7 kg (343 lb 4.1 oz)        Anesthesia Evaluation   Pt has had prior anesthetic. Type: General.    No history of anesthetic complications       ROS/MED HX  ENT/Pulmonary:     (+) sleep apnea, uses CPAP, PIPPA risk factors, snores loudly, hypertension, obese, asthma COPD,     Neurologic:  - neg neurologic ROS     Cardiovascular:     (+) Dyslipidemia hypertension-----Taking blood thinners     METS/Exercise Tolerance:     Hematologic:  - neg hematologic  ROS     Musculoskeletal: Comment: Fibromyalgia, Gout      GI/Hepatic:  - neg GI/hepatic ROS      Renal/Genitourinary:     (+) renal disease, type: CRI, Pt does not require dialysis,     Endo:     (+) Obesity (BMI 50),     Psychiatric/Substance Use: Comment: PTSD    (+) psychiatric history other (comment)     Infectious Disease:  - neg infectious disease ROS     Malignancy: Comment: Wide local excision and sentinel lymph node biopsy for concern of melanoma.      Other:            Physical Exam    Airway        Mallampati: III   TM distance: > 3 FB   Neck ROM: full   Mouth opening: > 3 cm    Respiratory Devices and Support         Dental       (+) Minor Abnormalities - some fillings, tiny chips      Cardiovascular          Rhythm and rate: regular     Pulmonary           breath sounds clear to auscultation           OUTSIDE LABS:  CBC: No results found for: WBC, HGB, HCT, PLT  BMP:   Lab Results   Component Value Date    CR 1.7 (H) 01/02/2023     (H) 02/13/2023     COAGS: No results found for: PTT, INR, FIBR  POC: No results found for: BGM, HCG, HCGS  HEPATIC: No results found for: ALBUMIN, PROTTOTAL, ALT, AST, GGT, ALKPHOS, BILITOTAL, BILIDIRECT, KAELYN  OTHER: No results found for: PH, LACT, A1C, MACK, PHOS, MAG, LIPASE, AMYLASE, TSH, T4, T3, CRP, SED    Anesthesia Plan    ASA Status:  3      Anesthesia Type: General.     - Airway: ETT   Induction: Intravenous.   Maintenance: Balanced.   Techniques and Equipment:     AVOID: long acting muscle relaxants.     Airway: Glidescope available.         Consents    Anesthesia Plan(s) and associated risks, benefits, and realistic alternatives discussed. Questions answered and patient/representative(s) expressed understanding.    - Discussed:     - Discussed with:  Patient         Postoperative Care       PONV prophylaxis: Ondansetron (or other 5HT-3)     Comments:    Other Comments: Discussed with CRNA.            Mary Alice Roach MD

## 2023-04-05 NOTE — ANESTHESIA CARE TRANSFER NOTE
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    Anesthesia Type:   General     Note:    Oropharynx: oropharynx clear of all foreign objects  Level of Consciousness: drowsy  Oxygen Supplementation: face mask  Level of Supplemental Oxygen (L/min / FiO2): 10  Independent Airway: airway patency satisfactory and stable  Dentition: dentition unchanged  Vital Signs Stable: post-procedure vital signs reviewed and stable  Report to RN Given: handoff report given  Patient transferred to: PACU    Handoff Report: Identifed the Patient, Identified the Reponsible Provider, Reviewed the pertinent medical history, Discussed the surgical course, Reviewed Intra-OP anesthesia mangement and issues during anesthesia, Set expectations for post-procedure period and Allowed opportunity for questions and acknowledgement of understanding      Vitals:  Vitals Value Taken Time   BP     Temp     Pulse 99 04/05/23 1216   Resp 16 04/05/23 1216   SpO2 94 % 04/05/23 1216   Vitals shown include unvalidated device data.    Electronically Signed By: MARITZA Melendez CRNA  April 5, 2023  12:18 PM

## 2023-04-05 NOTE — OP NOTE
April 5, 2023  Operative report    Pre-op Diagnosis:  Conjunctival melanoma  Post-op Diagnosis:   Same   Procedure:     1. Sentinal lymph node biopsy   2. Methylene blue lymph node mapping     Surgeons:   Ev Kelley MD; Jose Guadalupe Warner MD  Anesthesia:  GETA  EBL:  10cc  Drains:  None      Complications:  None   Specimens:   Nettie lymph node #1, additional tissue excised    Findings: Right neck sentinel lymph node identified as blue and hot along the external jugular vein.  Additional tissue excised however there is no further sentinel nodes identified.    Indications:  Mr. Garcia is a 78 year old male with a history of conjunctival melanoma that was previously excised with positive margins.  Prior lymphoscintigraphy was performed however sentinel node was not biopsied due to minimal lymph node uptake.  Lymphoscintigraphy was repeated today with demonstration of a sentinel node along the right external jugular vein.  Wide local excision of the right lower eyelid was performed by Dr. Gomez and will be dictated separately.  After discussion of risk and benefits the patient elected to proceed.    Procedure:  After consent, the patient was brought to the operating room and placed in the supine position.  Following induction, the patient was intubated orotracheally.  Monitoring lines were placed as appropriate.  The lower eyelid was injected with 0.5 cc of methylene blue.  Patient was then prepped and draped in the usual sterile fashion.  Wide local excision of the lower eyelid was performed by Dr. Gomez.  After he this was completed we began our evaluation.  The gamma probe was used to identify the location of the sentinel node all along the skin located approximately at the level of the angle of the mandible.  This was marked with a marking pen and a 3 cm incision was designed 2 fingerbreadths below the mandible in a skin crease.  This was injected with 1:100,000 epinephrine.  An incision was  made in the skin using a 15 blade.  This was carried to the level of platysma which was sharply divided.  Subplatysmal flaps were elevated superiorly and inferiorly.  The gamma probe was used to identify presence of radioactive tracer superiorly in the tail of the parotid.  Blunt dissection was used to identify a lymph node in this region that was blue on inspection.  This was excised using bipolar cautery and the gamma probe confirmed radiotracer presence within the node.  Additional evaluation of the wound bed was performed using the gamma probe with some mildly elevated readings.  Additional lymphoid tissue was excised from this region with no evidence of radiotracer within any of this material.  This was completed and the readings on the gamma probe within the wound bed decreased until they were roughly 10% of the initial reading.  None of the tissue that was excised demonstrated any methylene blue or elevated readings on the gamma probe.  The wound was then irrigated with normal saline and hemostasis was achieved using bipolar cautery.  The dermis was approximated using 3-0 Vicryl.  The skin was approximated using subcuticular 5-0 Monocryl and covered with Steri-Strips.  This completed the procedure.  The patient was then handed back to anesthesia, awoken and extubated and brought to PACU in stable condition.    Dr. Warner was present and scrubbed for the duration of the procedure.    Ev Kelley MD

## 2023-04-05 NOTE — OP NOTE
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    POSTOPERATIVE DIAGNOSIS:  Malignant melanoma of right eyelid and conjunctiva.    PROCEDURE PERFORMED:  Wide excision of right lower eyelid and conjunctiva melanoma.    SURGEON:  Keven Gomez MD    ASSISTANT:  Chaya Mane MD and Bhavya Choudhury MD    ANESTHESIA:  General.    COMPLICATIONS:  None.    ESTIMATED BLOOD ESTIMATED BLOOD LOSS:  5 mL.    SPECIMENS:  Right lower eyelid and conjunctiva in formalin for pathologic evaluation.  Sutures placed at lateral tarsal border and inferomedial conjunctival border.    HISTORY AND INDICATIONS:  The patient presented with a conjunctival lesion that was excised and found to be melanoma.  Previous excision had positive margins.  Therefore, wide excision was recommended along with sentinel lymph node biopsy, which was performed simultaneously by Dr. Warner and will be dictated separately.    DESCRIPTION OF PROCEDURE:  The patient was brought to the operating room and placed supine on the operating table.  General anesthesia was induced.  The lower eyelid was infiltrated with local anesthetic.  Dr. Warner injected the lower eyelid with methylene blue dye.  He was prepped and draped in the typical sterile fashion for oculoplastic surgery.  Attention was directed to the right lower eyelid.  A Sofiya lid plate was used to protect the globe.  Ofelia scissors was used to make incisions nasally and temporally, full thickness through the eyelid.  This incision was then extended inferiorly through the entire tarsus.  Anteriorly, the skin was incised with the Ofelia scissors as well for approximately 1 cm.  The dissection was then carried along the conjunctiva, which was excised to the limbus nasally and temporally.  The total excised specimen measured approximately 3.5 x 1.5 cm.  Hemostasis was obtained with the monopolar cautery.  A temporary tarsorrhaphy of 5-0  Prolene was then placed over Telfa gauze sponges.  The patient tolerated this portion of the procedure well.  Dr. Warner then performed a sentinel lymph node biopsy and this will be dictated separately.     Keven Gomez MD        D: 2023   T: 2023   MT: [id number]    Name:     CARTER XIAO  MRN:      [id number]        Account:        [id number]   :      1945           Procedure Date: 2023     Document: [id number]

## 2023-04-05 NOTE — OR NURSING
Discussed discharge medications with patient as [facility] [place]. Pt states it would be an extra drive for his daughter and him to go to the [facility], pt states he has tylenol and eye ointment at home and will need Keflex filled for him at [facility] pharmacy. Paged Dr. Choudhury with Ophthalmology to have keflex script sent to [facility] discharge pharmacy, updated phase 2 RN.

## 2023-04-05 NOTE — BRIEF OP NOTE
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]  Post-operative diagnosis Same as pre-operative diagnosis    Procedure: Procedure(s):  Wide Excision of Right Lower eyelid melanoma  SENTINAL LYMPH  NODE BIOPSY  Surgeon: Surgeon(s) and Role:  Panel 1:     * Keven Gomez MD - Primary     * Bhavya Choudhury MD - Resident - Assisting     * Chaya Mane MD - Fellow - Assisting  Panel 2:     * Jose Guadalupe Warner MD - Primary     * Cherry Smith MD - Resident - Assisting  Anesthesia: General   Estimated Blood Loss: Less than 10 ml    Drains: None  Specimens:   ID Type Source Tests Collected by Time Destination   1 : right lower eyelid, sutures lateral tarsus infromedial conjunctiva Tissue Eyelid, Lower, Right SURGICAL PATHOLOGY EXAM Keven Gomez MD 4/5/2023 10:52 AM    2 : SENTINAL NODE #1 (HOT AND BLUE) Tissue Lymph Node(s), [place] SURGICAL PATHOLOGY EXAM Keven Gomez MD 4/5/2023 11:26 AM    3 : ADDDITIONAL TISSUE Tissue Other SURGICAL PATHOLOGY EXAM Jose Guadalupe Warner MD 4/5/2023 11:31 AM      Findings:   None Right neck sentinal lymph node identified  - blue and hot along EJ.  Additional tissue excised however no further hot or blue nodes identified. .  Complications: None.  Implants: * No implants in log *

## 2023-04-05 NOTE — BRIEF OP NOTE
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]  Post-operative diagnosis Same as pre-operative diagnosis    Procedure: Procedure(s):  Wide Excision of Right Lower eyelid melanoma  possible BIOPSY, LYMPH NODE, SENTINEL  Surgeon: Surgeon(s) and Role:  Panel 1:     * Keven Gomez MD - Primary     * Bhavya Choudhury MD - Resident - Assisting     * Chaya Mane MD - Fellow - Assisting  Panel 2:     * Jose Guadalupe Warner MD - Primary     * Cherry Smith MD - Resident - Assisting  Anesthesia: General   Estimated Blood Loss: Minimal    Drains: None  Specimens:   ID Type Source Tests Collected by Time Destination   1 : right lower eyelid, sutures lateral tarsus infromedial conjunctiva Tissue Eyelid, Lower, Right SURGICAL PATHOLOGY EXAM Keven Gomez MD 4/5/2023 10:52 AM    2 : SENTINAL NODE #1 (HOT AND BLUE) Tissue Lymph Node(s), [place] SURGICAL PATHOLOGY EXAM Keven Gomez MD 4/5/2023 11:26 AM    3 : ADDDITIONAL TISSUE Tissue Other SURGICAL PATHOLOGY EXAM Jose Guadalupe Warner MD 4/5/2023 11:31 AM      Findings:   As expected.  Complications: None.  Implants: * No implants in log *

## 2023-04-11 ENCOUNTER — ANESTHESIA EVENT (OUTPATIENT)
Dept: SURGERY | Facility: AMBULATORY SURGERY CENTER | Age: 78
End: 2023-04-11
Payer: COMMERCIAL

## 2023-04-11 RX ORDER — SODIUM CHLORIDE, SODIUM LACTATE, POTASSIUM CHLORIDE, CALCIUM CHLORIDE 600; 310; 30; 20 MG/100ML; MG/100ML; MG/100ML; MG/100ML
INJECTION, SOLUTION INTRAVENOUS CONTINUOUS
Status: CANCELLED | OUTPATIENT
Start: 2023-04-11

## 2023-04-11 RX ORDER — FENTANYL CITRATE 50 UG/ML
25 INJECTION, SOLUTION INTRAMUSCULAR; INTRAVENOUS EVERY 5 MIN PRN
Status: CANCELLED | OUTPATIENT
Start: 2023-04-11

## 2023-04-11 RX ORDER — FENTANYL CITRATE 50 UG/ML
50 INJECTION, SOLUTION INTRAMUSCULAR; INTRAVENOUS EVERY 5 MIN PRN
Status: CANCELLED | OUTPATIENT
Start: 2023-04-11

## 2023-04-11 RX ORDER — ONDANSETRON 2 MG/ML
4 INJECTION INTRAMUSCULAR; INTRAVENOUS EVERY 30 MIN PRN
Status: CANCELLED | OUTPATIENT
Start: 2023-04-11

## 2023-04-11 RX ORDER — ONDANSETRON 4 MG/1
4 TABLET, ORALLY DISINTEGRATING ORAL EVERY 30 MIN PRN
Status: CANCELLED | OUTPATIENT
Start: 2023-04-11

## 2023-04-11 RX ORDER — HYDROMORPHONE HYDROCHLORIDE 1 MG/ML
0.4 INJECTION, SOLUTION INTRAMUSCULAR; INTRAVENOUS; SUBCUTANEOUS EVERY 5 MIN PRN
Status: CANCELLED | OUTPATIENT
Start: 2023-04-11

## 2023-04-11 RX ORDER — LABETALOL HYDROCHLORIDE 5 MG/ML
10 INJECTION, SOLUTION INTRAVENOUS
Status: CANCELLED | OUTPATIENT
Start: 2023-04-11

## 2023-04-11 RX ORDER — OXYCODONE HYDROCHLORIDE 5 MG/1
10 TABLET ORAL
Status: CANCELLED | OUTPATIENT
Start: 2023-04-11

## 2023-04-11 RX ORDER — HYDROMORPHONE HYDROCHLORIDE 1 MG/ML
0.2 INJECTION, SOLUTION INTRAMUSCULAR; INTRAVENOUS; SUBCUTANEOUS EVERY 5 MIN PRN
Status: CANCELLED | OUTPATIENT
Start: 2023-04-11

## 2023-04-11 RX ORDER — OXYCODONE HYDROCHLORIDE 5 MG/1
5 TABLET ORAL
Status: CANCELLED | OUTPATIENT
Start: 2023-04-11

## 2023-04-12 ENCOUNTER — ANESTHESIA (OUTPATIENT)
Dept: SURGERY | Facility: AMBULATORY SURGERY CENTER | Age: 78
End: 2023-04-12
Payer: COMMERCIAL

## 2023-04-12 ENCOUNTER — HOSPITAL ENCOUNTER (OUTPATIENT)
Facility: AMBULATORY SURGERY CENTER | Age: 78
Discharge: HOME | End: 2023-04-12
Attending: OPHTHALMOLOGY
Payer: COMMERCIAL

## 2023-04-12 ENCOUNTER — TELEPHONE (OUTPATIENT)
Dept: OPHTHALMOLOGY | Facility: CLINIC | Age: 78
End: 2023-04-12

## 2023-04-12 VITALS
HEIGHT: 69 IN | BODY MASS INDEX: 46.65 KG/M2 | WEIGHT: 315 LBS | RESPIRATION RATE: 16 BRPM | HEART RATE: 72 BPM | SYSTOLIC BLOOD PRESSURE: 150 MMHG | DIASTOLIC BLOOD PRESSURE: 87 MMHG | TEMPERATURE: 97.8 F | OXYGEN SATURATION: 95 %

## 2023-04-12 DIAGNOSIS — C69.01 MALIGNANT MELANOMA OF CONJUNCTIVA, RIGHT (H): Primary | ICD-10-CM

## 2023-04-12 DIAGNOSIS — C69.01 MALIGNANT MELANOMA OF CONJUNCTIVA, RIGHT (H): ICD-10-CM

## 2023-04-12 RX ORDER — ACETAMINOPHEN 325 MG/1
975 TABLET ORAL ONCE
Status: COMPLETED | OUTPATIENT
Start: 2023-04-12 | End: 2023-04-12

## 2023-04-12 RX ORDER — LIDOCAINE 40 MG/G
CREAM TOPICAL
Status: DISCONTINUED | OUTPATIENT
Start: 2023-04-12 | End: 2023-04-14 | Stop reason: HOSPADM

## 2023-04-12 RX ORDER — SODIUM CHLORIDE, SODIUM LACTATE, POTASSIUM CHLORIDE, CALCIUM CHLORIDE 600; 310; 30; 20 MG/100ML; MG/100ML; MG/100ML; MG/100ML
INJECTION, SOLUTION INTRAVENOUS CONTINUOUS
Status: DISCONTINUED | OUTPATIENT
Start: 2023-04-12 | End: 2023-04-14 | Stop reason: HOSPADM

## 2023-04-12 RX ADMIN — ACETAMINOPHEN 975 MG: 325 TABLET ORAL at 08:26

## 2023-04-12 NOTE — TELEPHONE ENCOUNTER
Spoke with patient to schedule surgery with Dr Gomez    Surgery was rescheduled on 4/19 at [facility] OR  Patient will have H&P at [facility], Provider Not In and is already done and in chart     Post-Op visit was scheduled on 4/24   Patient is aware a / is needed day of surgery.   Surgery packet was mailed, patient has my direct contact information for any further questions.     Patient surgery canceled moved to [place] on 4/19 due to BMI over 48%

## 2023-04-13 ENCOUNTER — TELEPHONE (OUTPATIENT)
Dept: OPHTHALMOLOGY | Facility: CLINIC | Age: 78
End: 2023-04-13

## 2023-04-13 NOTE — TELEPHONE ENCOUNTER
Dr. Lucas at [facility] eye clinic after speaking with pt.    Yesterday's eye surgery cancelled and rescheduled to 4- per recommendation per anesthesia yesterday.    2- surgery with Dr. Jason VILLA commented on more recent surgery on 4-3-2023 that I was not able to view/identify and pressure patch was placed on eye.    Pt briefly met with eye provider reviewing the rescheduling request and in process of rescheduling the pressure patch/dressing change was not addressed in pre-op area prior to rescheduling    Will forward to Dr. Mane/Care coordinator to review request/need for dressing change prior to 4- surgery    Bhavik Frey RN 11:55 AM 04/13/23

## 2023-04-13 NOTE — TELEPHONE ENCOUNTER
Reviewed by Dr. Mane and patch to remain in place for now until surgical day.    Spoke to pt at 1310    Pt verbally demonstrated understanding    Pt states also Dr. Gomez was going to assist in a referral for Knee surgeon for pt to have knees done with [facility].    Pt feels Dr. Gomez out for a week.    Pt would not anticipate having the knee surgery til maybe fall, but ok starting process    Will ask care coordinator to assist in referral request.    Bhavik Frey RN 1:21 PM 04/13/23

## 2023-04-18 ENCOUNTER — ANESTHESIA EVENT (OUTPATIENT)
Dept: SURGERY | Facility: CLINIC | Age: 78
End: 2023-04-18
Payer: COMMERCIAL

## 2023-04-19 ENCOUNTER — ANESTHESIA (OUTPATIENT)
Dept: SURGERY | Facility: CLINIC | Age: 78
End: 2023-04-19
Payer: COMMERCIAL

## 2023-04-19 ENCOUNTER — HOSPITAL ENCOUNTER (OUTPATIENT)
Facility: CLINIC | Age: 78
Discharge: HOME | End: 2023-04-19
Attending: OPHTHALMOLOGY | Admitting: OPHTHALMOLOGY
Payer: COMMERCIAL

## 2023-04-19 VITALS
SYSTOLIC BLOOD PRESSURE: 158 MMHG | TEMPERATURE: 98.2 F | HEART RATE: 76 BPM | WEIGHT: 315 LBS | BODY MASS INDEX: 46.65 KG/M2 | RESPIRATION RATE: 18 BRPM | HEIGHT: 69 IN | DIASTOLIC BLOOD PRESSURE: 90 MMHG | OXYGEN SATURATION: 94 %

## 2023-04-19 DIAGNOSIS — C69.01 MALIGNANT MELANOMA OF CONJUNCTIVA, RIGHT (H): Primary | ICD-10-CM

## 2023-04-19 PROCEDURE — 360N000077 HC SURGERY LEVEL 4, PER MIN: Performed by: OPHTHALMOLOGY

## 2023-04-19 PROCEDURE — 258N000003 HC RX IP 258 OP 636

## 2023-04-19 PROCEDURE — 68100 BIOPSY CONJUNCTIVA: CPT | Mod: RT | Performed by: OPHTHALMOLOGY

## 2023-04-19 PROCEDURE — 272N000001 HC OR GENERAL SUPPLY STERILE: Performed by: OPHTHALMOLOGY

## 2023-04-19 PROCEDURE — 250N000011 HC RX IP 250 OP 636

## 2023-04-19 PROCEDURE — 88341 IMHCHEM/IMCYTCHM EA ADD ANTB: CPT | Mod: 26 | Performed by: OPHTHALMOLOGY

## 2023-04-19 PROCEDURE — 88304 TISSUE EXAM BY PATHOLOGIST: CPT | Mod: 26 | Performed by: OPHTHALMOLOGY

## 2023-04-19 PROCEDURE — 250N000009 HC RX 250: Performed by: OPHTHALMOLOGY

## 2023-04-19 PROCEDURE — 710N000010 HC RECOVERY PHASE 1, LEVEL 2, PER MIN: Performed by: OPHTHALMOLOGY

## 2023-04-19 PROCEDURE — 999N000141 HC STATISTIC PRE-PROCEDURE NURSING ASSESSMENT: Performed by: OPHTHALMOLOGY

## 2023-04-19 PROCEDURE — 370N000017 HC ANESTHESIA TECHNICAL FEE, PER MIN: Performed by: OPHTHALMOLOGY

## 2023-04-19 PROCEDURE — 15260 FTH/GFT FR N/E/E/L 20 SQCM/<: CPT | Mod: GC | Performed by: OPHTHALMOLOGY

## 2023-04-19 PROCEDURE — 710N000012 HC RECOVERY PHASE 2, PER MINUTE: Performed by: OPHTHALMOLOGY

## 2023-04-19 PROCEDURE — 88342 IMHCHEM/IMCYTCHM 1ST ANTB: CPT | Mod: 26 | Performed by: OPHTHALMOLOGY

## 2023-04-19 PROCEDURE — 250N000009 HC RX 250

## 2023-04-19 PROCEDURE — 250N000025 HC SEVOFLURANE, PER MIN: Performed by: OPHTHALMOLOGY

## 2023-04-19 PROCEDURE — 67973 RECONSTRUCTION OF EYELID: CPT | Mod: E4 | Performed by: OPHTHALMOLOGY

## 2023-04-19 PROCEDURE — 11640 EXC F/E/E/N/L MAL+MRG 0.5CM<: CPT | Mod: GC | Performed by: OPHTHALMOLOGY

## 2023-04-19 PROCEDURE — 88304 TISSUE EXAM BY PATHOLOGIST: CPT | Mod: TC | Performed by: OPHTHALMOLOGY

## 2023-04-19 RX ORDER — ERYTHROMYCIN 5 MG/G
OINTMENT OPHTHALMIC
Qty: 3.5 G | Refills: 0 | Status: SHIPPED | OUTPATIENT
Start: 2023-04-19

## 2023-04-19 RX ORDER — FENTANYL CITRATE 50 UG/ML
INJECTION, SOLUTION INTRAMUSCULAR; INTRAVENOUS PRN
Status: DISCONTINUED | OUTPATIENT
Start: 2023-04-19 | End: 2023-04-19

## 2023-04-19 RX ORDER — FENTANYL CITRATE 50 UG/ML
25 INJECTION, SOLUTION INTRAMUSCULAR; INTRAVENOUS EVERY 5 MIN PRN
Status: DISCONTINUED | OUTPATIENT
Start: 2023-04-19 | End: 2023-04-19 | Stop reason: HOSPADM

## 2023-04-19 RX ORDER — ERYTHROMYCIN 5 MG/G
OINTMENT OPHTHALMIC PRN
Status: DISCONTINUED | OUTPATIENT
Start: 2023-04-19 | End: 2023-04-19 | Stop reason: HOSPADM

## 2023-04-19 RX ORDER — PROPOFOL 10 MG/ML
INJECTION, EMULSION INTRAVENOUS PRN
Status: DISCONTINUED | OUTPATIENT
Start: 2023-04-19 | End: 2023-04-19

## 2023-04-19 RX ORDER — CEFAZOLIN SODIUM 1 G/3ML
INJECTION, POWDER, FOR SOLUTION INTRAMUSCULAR; INTRAVENOUS PRN
Status: DISCONTINUED | OUTPATIENT
Start: 2023-04-19 | End: 2023-04-19

## 2023-04-19 RX ORDER — ONDANSETRON 4 MG/1
4 TABLET, ORALLY DISINTEGRATING ORAL EVERY 30 MIN PRN
Status: DISCONTINUED | OUTPATIENT
Start: 2023-04-19 | End: 2023-04-19 | Stop reason: HOSPADM

## 2023-04-19 RX ORDER — SODIUM CHLORIDE, SODIUM LACTATE, POTASSIUM CHLORIDE, CALCIUM CHLORIDE 600; 310; 30; 20 MG/100ML; MG/100ML; MG/100ML; MG/100ML
INJECTION, SOLUTION INTRAVENOUS CONTINUOUS PRN
Status: DISCONTINUED | OUTPATIENT
Start: 2023-04-19 | End: 2023-04-19

## 2023-04-19 RX ORDER — LABETALOL 20 MG/4 ML (5 MG/ML) INTRAVENOUS SYRINGE
PRN
Status: DISCONTINUED | OUTPATIENT
Start: 2023-04-19 | End: 2023-04-19

## 2023-04-19 RX ORDER — ONDANSETRON 2 MG/ML
4 INJECTION INTRAMUSCULAR; INTRAVENOUS EVERY 30 MIN PRN
Status: DISCONTINUED | OUTPATIENT
Start: 2023-04-19 | End: 2023-04-19 | Stop reason: HOSPADM

## 2023-04-19 RX ORDER — CEPHALEXIN 500 MG/1
500 CAPSULE ORAL 2 TIMES DAILY
Qty: 14 CAPSULE | Refills: 0 | Status: SHIPPED | OUTPATIENT
Start: 2023-04-19 | End: 2023-04-26

## 2023-04-19 RX ORDER — LIDOCAINE HYDROCHLORIDE 20 MG/ML
INJECTION, SOLUTION INFILTRATION; PERINEURAL PRN
Status: DISCONTINUED | OUTPATIENT
Start: 2023-04-19 | End: 2023-04-19

## 2023-04-19 RX ORDER — SODIUM CHLORIDE, SODIUM LACTATE, POTASSIUM CHLORIDE, CALCIUM CHLORIDE 600; 310; 30; 20 MG/100ML; MG/100ML; MG/100ML; MG/100ML
INJECTION, SOLUTION INTRAVENOUS CONTINUOUS
Status: DISCONTINUED | OUTPATIENT
Start: 2023-04-19 | End: 2023-04-19 | Stop reason: HOSPADM

## 2023-04-19 RX ORDER — LIDOCAINE HYDROCHLORIDE AND EPINEPHRINE 10; 10 MG/ML; UG/ML
INJECTION, SOLUTION INFILTRATION; PERINEURAL PRN
Status: DISCONTINUED | OUTPATIENT
Start: 2023-04-19 | End: 2023-04-19 | Stop reason: HOSPADM

## 2023-04-19 RX ORDER — EPHEDRINE SULFATE 50 MG/ML
INJECTION, SOLUTION INTRAMUSCULAR; INTRAVENOUS; SUBCUTANEOUS PRN
Status: DISCONTINUED | OUTPATIENT
Start: 2023-04-19 | End: 2023-04-19

## 2023-04-19 RX ORDER — FENTANYL CITRATE 50 UG/ML
50 INJECTION, SOLUTION INTRAMUSCULAR; INTRAVENOUS EVERY 5 MIN PRN
Status: DISCONTINUED | OUTPATIENT
Start: 2023-04-19 | End: 2023-04-19 | Stop reason: HOSPADM

## 2023-04-19 RX ORDER — NEOMYCIN POLYMYXIN B SULFATES AND DEXAMETHASONE 3.5; 10000; 1 MG/ML; [USP'U]/ML; MG/ML
1 SUSPENSION/ DROPS OPHTHALMIC 4 TIMES DAILY
Qty: 3 ML | Refills: 0 | Status: SHIPPED | OUTPATIENT
Start: 2023-04-19 | End: 2023-04-19

## 2023-04-19 RX ORDER — ONDANSETRON 2 MG/ML
INJECTION INTRAMUSCULAR; INTRAVENOUS PRN
Status: DISCONTINUED | OUTPATIENT
Start: 2023-04-19 | End: 2023-04-19

## 2023-04-19 RX ORDER — BACITRACIN ZINC 500 [USP'U]/G
OINTMENT TOPICAL
Qty: 14 G | Refills: 0 | Status: SHIPPED | OUTPATIENT
Start: 2023-04-19 | End: 2023-04-19

## 2023-04-19 RX ORDER — LIDOCAINE 40 MG/G
CREAM TOPICAL
Status: DISCONTINUED | OUTPATIENT
Start: 2023-04-19 | End: 2023-04-19 | Stop reason: HOSPADM

## 2023-04-19 RX ADMIN — Medication 10 MG: at 16:44

## 2023-04-19 RX ADMIN — FENTANYL CITRATE 50 MCG: 50 INJECTION, SOLUTION INTRAMUSCULAR; INTRAVENOUS at 15:44

## 2023-04-19 RX ADMIN — LABETALOL 20 MG/4 ML (5 MG/ML) INTRAVENOUS SYRINGE 10 MG: at 16:32

## 2023-04-19 RX ADMIN — PROPOFOL 100 MG: 10 INJECTION, EMULSION INTRAVENOUS at 15:44

## 2023-04-19 RX ADMIN — FENTANYL CITRATE 25 MCG: 50 INJECTION, SOLUTION INTRAMUSCULAR; INTRAVENOUS at 17:48

## 2023-04-19 RX ADMIN — SUGAMMADEX 400 MG: 100 INJECTION, SOLUTION INTRAVENOUS at 16:49

## 2023-04-19 RX ADMIN — FENTANYL CITRATE 25 MCG: 50 INJECTION, SOLUTION INTRAMUSCULAR; INTRAVENOUS at 17:05

## 2023-04-19 RX ADMIN — FENTANYL CITRATE 25 MCG: 50 INJECTION, SOLUTION INTRAMUSCULAR; INTRAVENOUS at 17:26

## 2023-04-19 RX ADMIN — FENTANYL CITRATE 25 MCG: 50 INJECTION, SOLUTION INTRAMUSCULAR; INTRAVENOUS at 17:11

## 2023-04-19 RX ADMIN — SODIUM CHLORIDE, POTASSIUM CHLORIDE, SODIUM LACTATE AND CALCIUM CHLORIDE: 600; 310; 30; 20 INJECTION, SOLUTION INTRAVENOUS at 15:29

## 2023-04-19 RX ADMIN — LIDOCAINE HYDROCHLORIDE 100 MG: 20 INJECTION, SOLUTION INFILTRATION; PERINEURAL at 15:44

## 2023-04-19 RX ADMIN — Medication 80 MG: at 15:44

## 2023-04-19 RX ADMIN — CEFAZOLIN 3 G: 1 INJECTION, POWDER, FOR SOLUTION INTRAMUSCULAR; INTRAVENOUS at 15:40

## 2023-04-19 RX ADMIN — ONDANSETRON 4 MG: 2 INJECTION INTRAMUSCULAR; INTRAVENOUS at 16:49

## 2023-04-19 ASSESSMENT — ACTIVITIES OF DAILY LIVING (ADL)
ADLS_ACUITY_SCORE: 38
ADLS_ACUITY_SCORE: 38
ADLS_ACUITY_SCORE: 35
ADLS_ACUITY_SCORE: 38

## 2023-04-19 ASSESSMENT — COPD QUESTIONNAIRES: COPD: 1

## 2023-04-19 NOTE — ANESTHESIA POSTPROCEDURE EVALUATION
Patient: Arthur Garcia    Procedure: Procedure(s):  right RECONSTRUCTION EYELID, MEDIAL AND LATERAL BIOPSY       Anesthesia Type:  General    Note:  Disposition: Outpatient   Postop Pain Control: Uneventful            Sign Out: Well controlled pain   PONV: No   Neuro/Psych: Uneventful            Sign Out: Acceptable/Baseline neuro status   Airway/Respiratory: Uneventful            Sign Out: Acceptable/Baseline resp. status   CV/Hemodynamics: Uneventful            Sign Out: Acceptable CV status; No obvious hypovolemia; No obvious fluid overload   Other NRE: NONE   DID A NON-ROUTINE EVENT OCCUR? No           Last vitals:  Vitals Value Taken Time   /95 04/19/23 1800   Temp 36.3  C (97.4  F) 04/19/23 1754   Pulse 66 04/19/23 1809   Resp 15 04/19/23 1809   SpO2 95 % 04/19/23 1809   Vitals shown include unvalidated device data.    Electronically Signed By: Cj Mercer  April 19, 2023  6:11 PM

## 2023-04-19 NOTE — ANESTHESIA PROCEDURE NOTES
Airway       Patient location during procedure: OR       Procedure Start/Stop Times: 4/19/2023 3:49 PM  Staff -        Anesthesiologist:  Susana Harper MD       CRNA: Miguel A Gusman APRN CRNA       Performed By: CRNA  Consent for Airway        Urgency: elective  Indications and Patient Condition       Indications for airway management: shell-procedural       Induction type:intravenous       Mask difficulty assessment: 2 - vent by mask + OA or adjuvant +/- NMBA    Final Airway Details       Final airway type: endotracheal airway       Successful airway: ETT - single and Oral  Endotracheal Airway Details        ETT size (mm): 8.0       Cuffed: yes       Successful intubation technique: video laryngoscopy       VL Blade Size: MAC 3       Grade View of Cords: 2       Adjucts: stylet       Position: Left       Measured from: lips       Secured at (cm): 22       Bite block used: None    Post intubation assessment        Placement verified by: capnometry and equal breath sounds        Number of attempts at approach: 1       Number of other approaches attempted: 0       Secured with: silk tape       Ease of procedure: easy       Dentition: Intact and Unchanged    Medication(s) Administered   Medication Administration Time: 4/19/2023 3:49 PM

## 2023-04-19 NOTE — BRIEF OP NOTE
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]  Post-operative diagnosis Same as pre-operative diagnosis    Procedure: Procedure(s):  right RECONSTRUCTION EYELID, MEDIAL AND LATERAL BIOPSY  Surgeon: Surgeon(s) and Role:     * Keven Gomez MD - Primary     * Chaya Mane MD - Fellow - Assisting   Marlen Hills MD - resident assisting  Anesthesia: General   Estimated Blood Loss: Minimal    Drains: None  Specimens:   ID Type Source Tests Collected by Time Destination   1 : Lateral margin Tissue Other SURGICAL PATHOLOGY EXAM Keven Gomez MD 4/19/2023  4:13 PM    2 : Medial margin Tissue Other SURGICAL PATHOLOGY EXAM Keven Gomez MD 4/19/2023  4:13 PM      Findings:   None.  Complications: None.  Implants: * No implants in log *

## 2023-04-19 NOTE — DISCHARGE INSTRUCTIONS
Post-operative Instructions    Ophthalmic Plastic and Reconstructive Surgery  Keven Gomez M.D.  Chaya Mane M.D.    All instructions apply to the operated eye(s) or eyelid(s)      What to expect after surgery:  There will be some swelling, bruising, and likely a black eye (even into the lower eyelids and cheeks). Also expect crusting and discharge from the eye and/or incisions.   A small amount of surface bleeding is normal for the first 48 hours after surgery.  You may notice some bloody tears for the first few days after surgery. This is normal.  Your eye(s) and eyelid(s) may be painful and tender. This is normal after surgery. Use the pain medication as prescribed. If your pain does not improve despite the medication, contact the office.    Wound care and personal care:  Your eyelid has been sutured shut. It will remain like this for approximately 4 weeks.   Apply warm compresses 4 times a day until seen by your physician.   For warm packs you can place a cup of dry uncooked rice in a clean cotton sock. Place sock in microwave 30 seconds to one minute. Next place the warm sock into a plastic bag and wrap the bag with clean warm wet washcloth and place over operated eye.    You may shower or wash your hair the day after surgery. Do not bathe or go swimming for 1 week to prevent contamination of your wounds.  Do not apply make-up to the eyes or eyelids for 2 weeks after surgery.    Activity restrictions and driving:  Avoid heavy lifting, bending, exercise or strenuous activity for 1 week after surgery.  You may resume other activities and return to work as tolerated.  You may not resume driving until have you stopped using narcotic pain medications(such as Norco, Percocet, Tylenol #3).    Medications:  Restart all your regular home medications and eye drops today. If you take Plavix or Aspirin on a regular basis, wait for 3 days after your surgery before restarting these in order to decrease the risk  of bleeding complications.  Avoid aspirin and aspirin-like medications (Motrin, Aleve, Ibuprofen, Mand[place] etc) for 5 days to reduce the risk of bleeding. You may take Tylenol (acetaminophen) for pain.  In addition to your home medications, take the following post-operative medications as prescribed by your physician:  Apply antibiotic ointment (erythromycin) inside the right eye three times a day until empty    Antibiotic - take as directed   Take scheduled extra strength Tylenol for pain.  You may take 1 to 2 pain pills (norco or oxycodone as prescribed) as needed for breakthrough pain up to every 6 hours.  The pain pills may make you drowsy. You must not drive a car, operate heavy machinery or drink alcohol while taking them.  The pain pills may cause constipation and nausea. Take them with some food to prevent a stomach upset. If you continue to experience nausea, call your physician.    WARNING: All the prescription pain medications listed above contain Tylenol (acetaminophen). You must not take more than 4,000 mg of acetaminophen per 24-hour period. This is equivalent to 6 tablets of Darvocet, 8 tablets of Vicodin, or 12 tablets of Norco, Percocet or Tylenol #3. If you take other over-the-counter medications containing acetaminophen, you must take the amount of acetaminophen into account and reduce the number of prescribed pain pills accordingly.    Contact information and follow-up:  Return to the Eye Clinic for a follow-up appointment with your physician as  scheduled. If no appointment has been scheduled, call [phone] for an  appointment with Dr. Gomez within 1 to 2 weeks from your date of surgery.  -     Please email a few photos of your eye(s) or other operative site(s) to [email or web address] prior to your follow up visit.    For severe pain, bleeding, or loss of vision, call the Eye Clinic at [phone].  After hours or on weekends and holidays, call [phone] and ask to speak with  the ophthalmologist on call.        [facility]  Same-Day Surgery   Adult Discharge Orders & Instructions     For 24 hours after surgery    Get plenty of rest.  A responsible adult must stay with you for at least 24 hours after you leave the hospital.   Do not drive or use heavy equipment.  If you have weakness or tingling, don't drive or use heavy equipment until this feeling goes away.  Do not drink alcohol.  Avoid strenuous or risky activities.  Ask for help when climbing stairs.   You may feel lightheaded.  IF so, sit for a few minutes before standing.  Have someone help you get up.   If you have nausea (feel sick to your stomach): Drink only clear liquids such as apple juice, ginger ale, broth or 7-Up.  Rest may also help.  Be sure to drink enough fluids.  Move to a regular diet as you feel able.  You may have a slight fever. Call the doctor if your fever is over 100 F (37.7 C) (taken under the tongue) or lasts longer than 24 hours.  You may have a dry mouth, a sore throat, muscle aches or trouble sleeping.  These should go away after 24 hours.  Do not make important or legal decisions.   Call your doctor for any of the followin.  Signs of infection (fever, growing tenderness at the surgery site, a large amount of drainage or bleeding, severe pain, foul-smelling drainage, redness, swelling).    2. It has been over 8 to 10 hours since surgery and you are still not able to urinate (pass water).    3.  Headache for over 24 hours.    To contact a doctor, call Dr Gomez's office at [phone]--Opthamology Clinic or [phone] [facility]   or:  '   [phone] and ask for the resident on call for   Ophthalmology (answered 24 hours a day)  '   Emergency Department:  [facility]: [phone]       (TTY for hearing impaired: [phone])

## 2023-04-19 NOTE — ANESTHESIA PREPROCEDURE EVALUATION
Anesthesia Pre-Procedure Evaluation    Patient: Arthur Garcia   MRN: [id number] : 1945        Procedure : Procedure(s):  right RECONSTRUCTION, EYELID and possible re-resection of eyelid and conjunctival lesion possible skin graft          Past Medical History:   Diagnosis Date     Arthritis      Depression      Disorder of lipoprotein and lipid metabolism      Gout      Hypertension       Past Surgical History:   Procedure Laterality Date     APPENDECTOMY  2000     BIOPSY NODE SENTINEL Right 2023    Procedure: SENTINAL LYMPH  NODE BIOPSY;  Surgeon: Jose Guadalupe Warner MD;  Location: [facility] OR     CHOLECYSTECTOMY   or      EXCISE LESION CONJUNCTIVAL Right 2023    Procedure: wide local excision of right conjuntivia lesion;  Surgeon: Keven Gomez MD;  Location: [facility] OR     EXCISE TUMOR EYELID Right 2023    Procedure: Wide Excision of Right Lower eyelid melanoma;  Surgeon: Keven Gomez MD;  Location: [facility] OR     PLACEMENT, AMNIOTIC MEMBRANE GRAFT Right 2023    Procedure: PLACEMENT, AMNIOTIC MEMBRANE GRAFT;  Surgeon: Keven Gomez MD;  Location: [facility] OR     PA TRABECULOPLASTY BY LASER SURGERY        Allergies   Allergen Reactions     Atorvastatin Muscle Pain (Myalgia)     Codeine Hives     Simvastatin Muscle Pain (Myalgia)     Other reaction(s): Muscle pain, Joint pain     Propoxyphene Rash and Swelling     Skin Adhesives Rash     Severely allergic to Coban - causing severe itching      Social History     Tobacco Use     Smoking status: Never     Smokeless tobacco: Never   Vaping Use     Vaping status: Not on file   Substance Use Topics     Alcohol use: No      Wt Readings from Last 1 Encounters:   23 148.1 kg (326 lb 8 oz)        Anesthesia Evaluation   Pt has had prior anesthetic. Type: General.        ROS/MED HX  ENT/Pulmonary:     (+) sleep apnea, uses CPAP, asthma COPD,     Neurologic:  - neg neurologic ROS     Cardiovascular:     (+) hypertension-----     METS/Exercise Tolerance:     Hematologic:  - neg hematologic  ROS     Musculoskeletal: Comment: Fibromyalgia, Gout      GI/Hepatic:       Renal/Genitourinary:     (+) renal disease, type: CRI,     Endo:     (+) Obesity,     Psychiatric/Substance Use: Comment: PTSD      Infectious Disease:       Malignancy: Comment: Melanoma s/p wide local excision      Other:            Physical Exam    Airway        Mallampati: III   TM distance: > 3 FB   Neck ROM: full   Mouth opening: > 3 cm    Respiratory Devices and Support         Dental       (+) Multiple visibly decayed, broken teeth      Cardiovascular   cardiovascular exam normal          Pulmonary   pulmonary exam normal                OUTSIDE LABS:  CBC: No results found for: WBC, HGB, HCT, PLT  BMP:   Lab Results   Component Value Date    CR 1.7 (H) 01/02/2023     (H) 02/13/2023     COAGS: No results found for: PTT, INR, FIBR  POC: No results found for: BGM, HCG, HCGS  HEPATIC: No results found for: ALBUMIN, PROTTOTAL, ALT, AST, GGT, ALKPHOS, BILITOTAL, BILIDIRECT, KAELYN  OTHER: No results found for: PH, LACT, A1C, MACK, PHOS, MAG, LIPASE, AMYLASE, TSH, T4, T3, CRP, SED    Anesthesia Plan    ASA Status:  3      Anesthesia Type: General.     - Airway: ETT   Induction: Intravenous.   Maintenance: Balanced.   Techniques and Equipment:     - Airway: Video-Laryngoscope         Consents    Anesthesia Plan(s) and associated risks, benefits, and realistic alternatives discussed. Questions answered and patient/representative(s) expressed understanding.    - Discussed:     - Discussed with:  Patient      - Extended Intubation/Ventilatory Support Discussed: Yes.      - Patient is DNR/DNI Status: No    Use of blood products discussed: No .     Postoperative Care    Pain management: Multi-modal analgesia.   PONV prophylaxis: Ondansetron (or other 5HT-3)     Comments:              PAC Discussion and Assessment    ASA Classification: 3    Anesthetic techniques and relevant  risks discussed: GA  Invasive monitoring and risk discussed: No    Possibility and Risk of blood transfusion discussed: No  NPO instructions given: NPO after midnight    Needs early admission to pre-op area: No                                             Ria Witt MD

## 2023-04-19 NOTE — LETTER
"April 24, 2023      Arthur Garcia  [id number] [address] [place] [id number]        Dear ,    We are writing to inform you of your test results.    Your biopsy results were normal. Please call my office if your symptoms worsen or if you have any questions.     Resulted Orders   Surgical Pathology Exam   Result Value Ref Range    Case Report       Surgical Pathology Report                         Case: [id number]                                  Authorizing Provider:  Keven Gomez MD       Collected:           04/19/2023 04:13 PM          Ordering Location:      MAIN OR                 Received:            04/19/2023 05:02 PM          Pathologist:           Livia Christie MD                                                         Specimens:   A) - Other, Lateral margin                                                                          B) - Other, Medial margin                                                                  Final Diagnosis       A. Lateral eyelid margin, right, biopsy: No melanoma identified.     B. Medial eyelid margin, right, biopsy: No melanoma identified.       Clinical Information       The patient is a 78 year old male with history of a large conjunctival melanoma.  He has undergone full-thickness resection of eyelid and conjunctiva.  The medial and lateral margins were positive not for invasive melanoma, but for in situ disease.  He undergoes eyelid reconstruction with medial and lateral biopsy on the right.       Gross Description       A(1). Other, Lateral margin:  The specimen is received in formalin with proper patient identification, labeled \"lateral margin\".  The specimen consists of a 0.7 x 0.3 x 0.2 cm irregular tan-white soft tissue.  The smooth, possibly transected margin is inked blue, the specimen is submitted entirely, intact in block A1.    B(2). Other, Medial margin:  The specimen is received in formalin with proper patient identification, labeled \"medial " "margin\".  The specimen consists of a 0.8 x 0.2 x 0.2 cm irregular, slightly nodular tan soft tissue.  The smooth, presumed resection margin is inked blue, the specimen is left intact, and is submitted entirely in block B1.         Microscopic Description       A. The tissue consists of conjunctiva and granulation tissue. On immunohistochemistry with Melan-A and SOX10, there is a single focus with mildly increased density of melanocytes, but no melanoma is identified.     B. The tissue consists of eyelid stroma with dropout spaces and granulation tissue. No melanoma identified on H&E or on immunohistochemistry with Melan-A and SOX10.       Performing Labs       The technical component of this testing was completed at [facility]        Case Images         If you have any questions or concerns, please call the clinic at the number listed above.       Sincerely,      Keven Gomez MD  Director, Ophthalmic Plastic and Reconstructive Surgery   Departments of Ophthalmology and Otolaryngology   [facility]           "

## 2023-04-19 NOTE — ANESTHESIA CARE TRANSFER NOTE
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    Anesthesia Type:   General     Note:    Oropharynx: oropharynx clear of all foreign objects and spontaneously breathing  Level of Consciousness: awake  Oxygen Supplementation: room air    Independent Airway: airway patency satisfactory and stable  Dentition: dentition unchanged  Vital Signs Stable: post-procedure vital signs reviewed and stable  Report to RN Given: handoff report given  Patient transferred to: PACU    Handoff Report: Identifed the Patient, Identified the Reponsible Provider, Reviewed the pertinent medical history, Discussed the surgical course, Reviewed Intra-OP anesthesia mangement and issues during anesthesia, Set expectations for post-procedure period and Allowed opportunity for questions and acknowledgement of understanding      Vitals:  Vitals Value Taken Time   /73 04/19/23 1754   Temp     Pulse 62 04/19/23 1757   Resp 14 04/19/23 1757   SpO2 96 % 04/19/23 1757   Vitals shown include unvalidated device data.    Electronically Signed By: MARITZA Feldman CRNA  April 19, 2023  5:59 PM

## 2023-04-21 LAB
PATH REPORT.COMMENTS IMP SPEC: NORMAL
PATH REPORT.COMMENTS IMP SPEC: NORMAL
PATH REPORT.FINAL DX SPEC: NORMAL
PATH REPORT.GROSS SPEC: NORMAL
PATH REPORT.MICROSCOPIC SPEC OTHER STN: NORMAL
PATH REPORT.RELEVANT HX SPEC: NORMAL
PHOTO IMAGE: NORMAL

## 2023-04-24 ENCOUNTER — OFFICE VISIT (OUTPATIENT)
Dept: OPHTHALMOLOGY | Facility: CLINIC | Age: 78
End: 2023-04-24
Payer: COMMERCIAL

## 2023-04-24 DIAGNOSIS — C69.01 MALIGNANT MELANOMA OF CONJUNCTIVA, RIGHT (H): Primary | ICD-10-CM

## 2023-04-24 DIAGNOSIS — Z98.890 POSTOPERATIVE EYE STATE: ICD-10-CM

## 2023-04-24 PROCEDURE — 99024 POSTOP FOLLOW-UP VISIT: CPT | Performed by: OPHTHALMOLOGY

## 2023-04-24 ASSESSMENT — VISUAL ACUITY
METHOD: SNELLEN - LINEAR
OS_CC: 20/30
OS_CC+: +1
CORRECTION_TYPE: GLASSES

## 2023-04-24 ASSESSMENT — TONOMETRY
OS_IOP_MMHG: 12
IOP_METHOD: ICARE

## 2023-04-24 NOTE — NURSING NOTE
Chief Complaints and History of Present Illnesses   Patient presents with     Post Op (Ophthalmology) Right Eye     Pt here for POW#2 s/p right RECONSTRUCTION EYELID, MEDIAL AND LATERAL BIOPSY     Chief Complaint(s) and History of Present Illness(es)     Post Op (Ophthalmology) Right Eye            Laterality: right eye    Associated symptoms: tearing    Comments: Pt here for POW#2 s/p right RECONSTRUCTION EYELID, MEDIAL AND LATERAL BIOPSY          Comments    Pt notes everything is going well. Eye is tearing. Denies pain. Pt does have a headache currently, but does have hx of them.   Melissa Fry, COA on 4/24/2023 at 1:02 PM

## 2023-04-24 NOTE — PATIENT INSTRUCTIONS
Erythromycin ointment - apply to eyelids twice a day   Use warm soaks over the incision(s) four times a day until swelling and bruises resolve.

## 2023-04-25 ENCOUNTER — TELEPHONE (OUTPATIENT)
Dept: OPHTHALMOLOGY | Facility: CLINIC | Age: 78
End: 2023-04-25

## 2023-04-25 NOTE — TELEPHONE ENCOUNTER
Spoke with patient to schedule surgery with Dr Gomez    Surgery was scheduled on 6/28 at [facility]  Patient will have H&P at [facility]    Post-Op visit was scheduled on 7/10  Patient is aware a / is needed day of surgery.   Surgery packet was mailed, patient has my direct contact information for any further questions.

## 2023-06-23 ENCOUNTER — TRANSFERRED RECORDS (OUTPATIENT)
Dept: MULTI SPECIALTY CLINIC | Facility: CLINIC | Age: 78
End: 2023-06-23

## 2023-06-23 LAB
ALT SERPL-CCNC: 55 U/L (ref 0–55)
AST SERPL-CCNC: 36 U/L (ref 5–40)
CHOLESTEROL (EXTERNAL): 318 MG/DL
CREATININE (EXTERNAL): 1.8 MG/DL (ref 0.5–1.5)
GFR ESTIMATED (EXTERNAL): 38 ML/MIN
GLUCOSE (EXTERNAL): 215 MG/DL (ref 70–100)
HBA1C MFR BLD: 6 % (ref 4–6)
HDLC SERPL-MCNC: 29 MG/DL
LDL CHOLESTEROL DIRECT (EXTERNAL): 99 MG/DL
POTASSIUM (EXTERNAL): 3.7 MMOL/L (ref 3.5–5)
TRIGLYCERIDES (EXTERNAL): 748 MG/DL

## 2023-06-28 ENCOUNTER — ANESTHESIA (OUTPATIENT)
Dept: SURGERY | Facility: CLINIC | Age: 78
End: 2023-06-28
Payer: COMMERCIAL

## 2023-06-28 ENCOUNTER — ANESTHESIA EVENT (OUTPATIENT)
Dept: SURGERY | Facility: CLINIC | Age: 78
End: 2023-06-28
Payer: COMMERCIAL

## 2023-06-28 ENCOUNTER — HOSPITAL ENCOUNTER (OUTPATIENT)
Facility: CLINIC | Age: 78
Discharge: HOME | End: 2023-06-28
Attending: OPHTHALMOLOGY | Admitting: OPHTHALMOLOGY
Payer: COMMERCIAL

## 2023-06-28 VITALS
HEIGHT: 64 IN | RESPIRATION RATE: 12 BRPM | BODY MASS INDEX: 53.78 KG/M2 | DIASTOLIC BLOOD PRESSURE: 99 MMHG | HEART RATE: 67 BPM | TEMPERATURE: 98.1 F | OXYGEN SATURATION: 94 % | WEIGHT: 315 LBS | SYSTOLIC BLOOD PRESSURE: 158 MMHG

## 2023-06-28 DIAGNOSIS — C69.01 MALIGNANT MELANOMA OF CONJUNCTIVA, RIGHT (H): Primary | ICD-10-CM

## 2023-06-28 PROCEDURE — 250N000013 HC RX MED GY IP 250 OP 250 PS 637: Performed by: ANESTHESIOLOGY

## 2023-06-28 PROCEDURE — 258N000003 HC RX IP 258 OP 636: Performed by: NURSE ANESTHETIST, CERTIFIED REGISTERED

## 2023-06-28 PROCEDURE — 250N000025 HC SEVOFLURANE, PER MIN: Performed by: OPHTHALMOLOGY

## 2023-06-28 PROCEDURE — 250N000009 HC RX 250: Performed by: NURSE ANESTHETIST, CERTIFIED REGISTERED

## 2023-06-28 PROCEDURE — 67975 RECONSTRUCTION OF EYELID: CPT | Mod: 58 | Performed by: OPHTHALMOLOGY

## 2023-06-28 PROCEDURE — 370N000017 HC ANESTHESIA TECHNICAL FEE, PER MIN: Performed by: OPHTHALMOLOGY

## 2023-06-28 PROCEDURE — 999N000141 HC STATISTIC PRE-PROCEDURE NURSING ASSESSMENT: Performed by: OPHTHALMOLOGY

## 2023-06-28 PROCEDURE — 710N000012 HC RECOVERY PHASE 2, PER MINUTE: Performed by: OPHTHALMOLOGY

## 2023-06-28 PROCEDURE — 710N000010 HC RECOVERY PHASE 1, LEVEL 2, PER MIN: Performed by: OPHTHALMOLOGY

## 2023-06-28 PROCEDURE — 272N000001 HC OR GENERAL SUPPLY STERILE: Performed by: OPHTHALMOLOGY

## 2023-06-28 PROCEDURE — 250N000009 HC RX 250: Performed by: OPHTHALMOLOGY

## 2023-06-28 PROCEDURE — 360N000076 HC SURGERY LEVEL 3, PER MIN: Performed by: OPHTHALMOLOGY

## 2023-06-28 PROCEDURE — 250N000011 HC RX IP 250 OP 636: Mod: JZ | Performed by: NURSE ANESTHETIST, CERTIFIED REGISTERED

## 2023-06-28 RX ORDER — ATENOLOL 25 MG/1
25 TABLET ORAL ONCE
Status: COMPLETED | OUTPATIENT
Start: 2023-06-28 | End: 2023-06-28

## 2023-06-28 RX ORDER — GLYCOPYRROLATE 0.2 MG/ML
INJECTION, SOLUTION INTRAMUSCULAR; INTRAVENOUS PRN
Status: DISCONTINUED | OUTPATIENT
Start: 2023-06-28 | End: 2023-06-28

## 2023-06-28 RX ORDER — FENTANYL CITRATE 50 UG/ML
INJECTION, SOLUTION INTRAMUSCULAR; INTRAVENOUS PRN
Status: DISCONTINUED | OUTPATIENT
Start: 2023-06-28 | End: 2023-06-28

## 2023-06-28 RX ORDER — LIDOCAINE HYDROCHLORIDE AND EPINEPHRINE 10; 10 MG/ML; UG/ML
INJECTION, SOLUTION INFILTRATION; PERINEURAL PRN
Status: DISCONTINUED | OUTPATIENT
Start: 2023-06-28 | End: 2023-06-28 | Stop reason: HOSPADM

## 2023-06-28 RX ORDER — ACETAMINOPHEN 325 MG/1
975 TABLET ORAL ONCE
Status: CANCELLED | OUTPATIENT
Start: 2023-06-28 | End: 2023-06-28

## 2023-06-28 RX ORDER — ERYTHROMYCIN 5 MG/G
OINTMENT OPHTHALMIC PRN
Status: DISCONTINUED | OUTPATIENT
Start: 2023-06-28 | End: 2023-06-28 | Stop reason: HOSPADM

## 2023-06-28 RX ORDER — ONDANSETRON 2 MG/ML
INJECTION INTRAMUSCULAR; INTRAVENOUS PRN
Status: DISCONTINUED | OUTPATIENT
Start: 2023-06-28 | End: 2023-06-28

## 2023-06-28 RX ORDER — ONDANSETRON 2 MG/ML
4 INJECTION INTRAMUSCULAR; INTRAVENOUS EVERY 30 MIN PRN
Status: DISCONTINUED | OUTPATIENT
Start: 2023-06-28 | End: 2023-06-28 | Stop reason: HOSPADM

## 2023-06-28 RX ORDER — SODIUM CHLORIDE, SODIUM LACTATE, POTASSIUM CHLORIDE, CALCIUM CHLORIDE 600; 310; 30; 20 MG/100ML; MG/100ML; MG/100ML; MG/100ML
INJECTION, SOLUTION INTRAVENOUS CONTINUOUS PRN
Status: DISCONTINUED | OUTPATIENT
Start: 2023-06-28 | End: 2023-06-28

## 2023-06-28 RX ORDER — FENTANYL CITRATE 50 UG/ML
25 INJECTION, SOLUTION INTRAMUSCULAR; INTRAVENOUS EVERY 5 MIN PRN
Status: DISCONTINUED | OUTPATIENT
Start: 2023-06-28 | End: 2023-06-28 | Stop reason: HOSPADM

## 2023-06-28 RX ORDER — SODIUM CHLORIDE, SODIUM LACTATE, POTASSIUM CHLORIDE, CALCIUM CHLORIDE 600; 310; 30; 20 MG/100ML; MG/100ML; MG/100ML; MG/100ML
INJECTION, SOLUTION INTRAVENOUS CONTINUOUS
Status: DISCONTINUED | OUTPATIENT
Start: 2023-06-28 | End: 2023-06-28 | Stop reason: HOSPADM

## 2023-06-28 RX ORDER — PROPOFOL 10 MG/ML
INJECTION, EMULSION INTRAVENOUS PRN
Status: DISCONTINUED | OUTPATIENT
Start: 2023-06-28 | End: 2023-06-28

## 2023-06-28 RX ORDER — OXYCODONE HYDROCHLORIDE 5 MG/1
5 TABLET ORAL
Status: DISCONTINUED | OUTPATIENT
Start: 2023-06-28 | End: 2023-06-28 | Stop reason: HOSPADM

## 2023-06-28 RX ORDER — NEOMYCIN POLYMYXIN B SULFATES AND DEXAMETHASONE 3.5; 10000; 1 MG/ML; [USP'U]/ML; MG/ML
1 SUSPENSION/ DROPS OPHTHALMIC 4 TIMES DAILY
Qty: 3 ML | Refills: 0 | Status: SHIPPED | OUTPATIENT
Start: 2023-06-28

## 2023-06-28 RX ORDER — DEXAMETHASONE SODIUM PHOSPHATE 4 MG/ML
INJECTION, SOLUTION INTRA-ARTICULAR; INTRALESIONAL; INTRAMUSCULAR; INTRAVENOUS; SOFT TISSUE PRN
Status: DISCONTINUED | OUTPATIENT
Start: 2023-06-28 | End: 2023-06-28

## 2023-06-28 RX ORDER — ONDANSETRON 4 MG/1
4 TABLET, ORALLY DISINTEGRATING ORAL EVERY 30 MIN PRN
Status: DISCONTINUED | OUTPATIENT
Start: 2023-06-28 | End: 2023-06-28 | Stop reason: HOSPADM

## 2023-06-28 RX ORDER — TETRACAINE HYDROCHLORIDE 5 MG/ML
SOLUTION OPHTHALMIC PRN
Status: DISCONTINUED | OUTPATIENT
Start: 2023-06-28 | End: 2023-06-28 | Stop reason: HOSPADM

## 2023-06-28 RX ORDER — EPHEDRINE SULFATE 50 MG/ML
INJECTION, SOLUTION INTRAMUSCULAR; INTRAVENOUS; SUBCUTANEOUS PRN
Status: DISCONTINUED | OUTPATIENT
Start: 2023-06-28 | End: 2023-06-28

## 2023-06-28 RX ORDER — LIDOCAINE HYDROCHLORIDE 20 MG/ML
INJECTION, SOLUTION INFILTRATION; PERINEURAL PRN
Status: DISCONTINUED | OUTPATIENT
Start: 2023-06-28 | End: 2023-06-28

## 2023-06-28 RX ADMIN — LIDOCAINE HYDROCHLORIDE 100 MG: 20 INJECTION, SOLUTION INFILTRATION; PERINEURAL at 10:26

## 2023-06-28 RX ADMIN — GLYCOPYRROLATE 0.2 MG: 0.2 INJECTION, SOLUTION INTRAMUSCULAR; INTRAVENOUS at 10:39

## 2023-06-28 RX ADMIN — PROPOFOL 200 MG: 10 INJECTION, EMULSION INTRAVENOUS at 10:26

## 2023-06-28 RX ADMIN — DEXAMETHASONE SODIUM PHOSPHATE 6 MG: 4 INJECTION, SOLUTION INTRA-ARTICULAR; INTRALESIONAL; INTRAMUSCULAR; INTRAVENOUS; SOFT TISSUE at 10:30

## 2023-06-28 RX ADMIN — Medication 10 MG: at 10:43

## 2023-06-28 RX ADMIN — FENTANYL CITRATE 50 MCG: 50 INJECTION, SOLUTION INTRAMUSCULAR; INTRAVENOUS at 10:36

## 2023-06-28 RX ADMIN — SODIUM CHLORIDE, POTASSIUM CHLORIDE, SODIUM LACTATE AND CALCIUM CHLORIDE: 600; 310; 30; 20 INJECTION, SOLUTION INTRAVENOUS at 10:26

## 2023-06-28 RX ADMIN — FENTANYL CITRATE 50 MCG: 50 INJECTION, SOLUTION INTRAMUSCULAR; INTRAVENOUS at 10:31

## 2023-06-28 RX ADMIN — ONDANSETRON 4 MG: 2 INJECTION INTRAMUSCULAR; INTRAVENOUS at 10:30

## 2023-06-28 RX ADMIN — ATENOLOL 25 MG: 25 TABLET ORAL at 12:06

## 2023-06-28 RX ADMIN — PROPOFOL 50 MG: 10 INJECTION, EMULSION INTRAVENOUS at 10:33

## 2023-06-28 ASSESSMENT — ACTIVITIES OF DAILY LIVING (ADL)
ADLS_ACUITY_SCORE: 36
ADLS_ACUITY_SCORE: 36
ADLS_ACUITY_SCORE: 33

## 2023-06-28 ASSESSMENT — COPD QUESTIONNAIRES: COPD: 1

## 2023-06-28 NOTE — ANESTHESIA POSTPROCEDURE EVALUATION
Patient: Arthur Garcia    Procedure: Procedure(s):  SECOND STAGE RECONSTRUCTION, RIGHT LOWER EYELID       Anesthesia Type:  General    Note:  Disposition: Outpatient   Postop Pain Control: Uneventful            Sign Out: Well controlled pain   PONV: No   Neuro/Psych: Uneventful            Sign Out: Acceptable/Baseline neuro status   Airway/Respiratory: Uneventful            Sign Out: Acceptable/Baseline resp. status   CV/Hemodynamics: Uneventful            Sign Out: Acceptable CV status; No obvious hypovolemia; No obvious fluid overload   Other NRE: NONE   DID A NON-ROUTINE EVENT OCCUR? No           Last vitals:  Vitals Value Taken Time   /93 06/28/23 1130   Temp 36.7  C (98  F) 06/28/23 1100   Pulse 66 06/28/23 1139   Resp 16 06/28/23 1137   SpO2 95 % 06/28/23 1139   Vitals shown include unvalidated device data.    Electronically Signed By: Myrtle Bajwa MD  June 28, 2023  11:41 AM

## 2023-06-28 NOTE — ANESTHESIA PREPROCEDURE EVALUATION
Anesthesia Pre-Procedure Evaluation    Patient: Arthur Garcia   MRN: [id number] : 1945        Procedure : Procedure(s):  SECOND STAGE RECONSTRUCTION, RIGHT LOWER EYELID          Past Medical History:   Diagnosis Date     Arthritis      Depression      Disorder of lipoprotein and lipid metabolism      Gout      Hypertension       Past Surgical History:   Procedure Laterality Date     APPENDECTOMY  2000     BIOPSY NODE SENTINEL Right 2023    Procedure: SENTINAL LYMPH  NODE BIOPSY;  Surgeon: Jose Guadalupe Warner MD;  Location: [facility] OR     CHOLECYSTECTOMY   or      EXCISE LESION CONJUNCTIVAL Right 2023    Procedure: wide local excision of right conjuntivia lesion;  Surgeon: Keven Gomez MD;  Location: [facility] OR     EXCISE TUMOR EYELID Right 2023    Procedure: Wide Excision of Right Lower eyelid melanoma;  Surgeon: Keven Gomez MD;  Location: [facility] OR     PLACEMENT, AMNIOTIC MEMBRANE GRAFT Right 2023    Procedure: PLACEMENT, AMNIOTIC MEMBRANE GRAFT;  Surgeon: Keven Gomez MD;  Location: [facility] OR     DE TRABECULOPLASTY BY LASER SURGERY       RECONSTRUCT EYELID WITH POSTAURICULAR SKIN GRAFT Right 2023    Procedure: right RECONSTRUCTION EYELID, MEDIAL AND LATERAL BIOPSY;  Surgeon: Keven Gomez MD;  Location: [facility] OR      Allergies   Allergen Reactions     Atorvastatin Muscle Pain (Myalgia)     Codeine Hives     Simvastatin Muscle Pain (Myalgia)     Other reaction(s): Muscle pain, Joint pain     Medical Adhesive Remover Rash     Severely allergic to Coban - causing severe itching     Propoxyphene Rash and Swelling      Social History     Tobacco Use     Smoking status: Never     Smokeless tobacco: Never   Substance Use Topics     Alcohol use: No      Wt Readings from Last 1 Encounters:   23 (!) 154.8 kg (341 lb 4.4 oz)        Anesthesia Evaluation   Pt has had prior anesthetic. Type: General.        ROS/MED HX  ENT/Pulmonary:     (+) sleep apnea, uses CPAP, asthma COPD,      Neurologic:  - neg neurologic ROS     Cardiovascular:     (+) hypertension-----    METS/Exercise Tolerance:     Hematologic:  - neg hematologic  ROS     Musculoskeletal: Comment: Fibromyalgia, Gout      GI/Hepatic:       Renal/Genitourinary:     (+) renal disease, type: CRI,     Endo:     (+) Obesity,     Psychiatric/Substance Use: Comment: PTSD      Infectious Disease:       Malignancy: Comment: Melanoma s/p wide local excision      Other:            Physical Exam    Airway        Mallampati: III   TM distance: > 3 FB   Neck ROM: full   Mouth opening: > 3 cm    Respiratory Devices and Support         Dental       (+) Minor Abnormalities - some fillings, tiny chips      Cardiovascular   cardiovascular exam normal          Pulmonary   pulmonary exam normal                OUTSIDE LABS:  CBC: No results found for: WBC, HGB, HCT, PLT  BMP:   Lab Results   Component Value Date    CR 1.7 (H) 01/02/2023     (H) 02/13/2023     COAGS: No results found for: PTT, INR, FIBR  POC: No results found for: BGM, HCG, HCGS  HEPATIC: No results found for: ALBUMIN, PROTTOTAL, ALT, AST, GGT, ALKPHOS, BILITOTAL, BILIDIRECT, KAELYN  OTHER: No results found for: PH, LACT, A1C, MACK, PHOS, MAG, LIPASE, AMYLASE, TSH, T4, T3, CRP, SED    Anesthesia Plan    ASA Status:  3   NPO Status:  NPO Appropriate    Anesthesia Type: General.     - Airway: LMA   Induction: Intravenous.   Maintenance: Balanced.        Consents    Anesthesia Plan(s) and associated risks, benefits, and realistic alternatives discussed. Questions answered and patient/representative(s) expressed understanding.    - Discussed:     - Discussed with:  Patient         Postoperative Care    Pain management: Multi-modal analgesia.   PONV prophylaxis: Ondansetron (or other 5HT-3)     Comments:                Myrtle Bajwa MD

## 2023-06-28 NOTE — OP NOTE
PREOPERATIVE DIAGNOSIS: Status post right lower eyelid reconstruction with Early flap for basal cell carcinoma.   POSTOPERATIVE DIAGNOSIS: Status post right lower eyelid reconstruction with Early flap for basal cell carcinoma.   PROCEDURE: Second stage reconstruction, right lower eyelid   ANESTHESIA: Monitored with local infiltration of a 50/50 mixture of 2% lidocaine with epinephrine and 0.5% Marcaine.   SURGEON: Gail Gomez MD  ASSISTANT: Chaya Mane MD and Marlen Hills MD  COMPLICATIONS: None.   ESTIMATED BLOOD LOSS: Less than 5 mL.   HISTORY: Arthur Garcia  presented with lower lid basal cell carcinoma and underwent Mohs surgery followed by reconstruction with Early flap. After the risks, benefits and alternatives to the proposed procedure were explained, informed consent was obtained.   DESCRIPTION OF PROCEDURE: The patient was brought to the operating room and placed supine on the operating table. IV sedation was given. The upper and lower eyelids were infiltrated with local anesthetic on the right side. The area was prepped and draped in the typical sterile fashion for oculoplastic surgery. Attention was directed to the right side. A thin malleable retractor was placed beneath the flap. A #15 blade was used to incise the tissue superior to the intended lid margin. Hemostasis was obtained with high temperature cautery. The residual flap was resected from the upper eyelid and high temperature cautery used to obtain hemostasis. The lower lid margin was then cauterized with the high temperature cautery and the excess tissue resected until the lid margin obtained a normal configuration and height. The area was dressed with antibiotic ointment. Arthur Garcia  tolerated the procedure well and was taken to recovery room in stable condition.   GAIL GOMEZ MD

## 2023-06-28 NOTE — ANESTHESIA CARE TRANSFER NOTE
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    Anesthesia Type:   No value filed.     Note:    Oropharynx: oropharynx clear of all foreign objects  Level of Consciousness: drowsy  Oxygen Supplementation: face mask    Independent Airway: airway patency satisfactory and stable  Dentition: dentition unchanged      Patient transferred to: PACU    Handoff Report: Identifed the Patient, Identified the Reponsible Provider, Reviewed the pertinent medical history, Discussed the surgical course, Reviewed Intra-OP anesthesia mangement and issues during anesthesia, Set expectations for post-procedure period and Allowed opportunity for questions and acknowledgement of understanding      Vitals:  Vitals Value Taken Time   BP     Temp     Pulse 65 06/28/23 1054   Resp 15 06/28/23 1054   SpO2 97 % 06/28/23 1054   Vitals shown include unvalidated device data.    Electronically Signed By: MARITZA Finley CRNA  June 28, 2023  10:55 AM

## 2023-06-28 NOTE — DISCHARGE INSTRUCTIONS
Post-operative Instructions    Ophthalmic Plastic and Reconstructive Surgery  Keven Gomez M.D.  Chaya Mane M.D.    All instructions apply to the operated eye(s) or eyelid(s)      What to expect after surgery:  There will be some swelling, bruising, and likely a black eye (even into the lower eyelids and cheeks). Also expect crusting and discharge from the eye and/or incisions.   A small amount of surface bleeding is normal for the first 48 hours after surgery.  You may notice some bloody tears for the first few days after surgery. This is normal.  Your eye(s) and eyelid(s) may be painful and tender. This is normal after surgery. Use the pain medication as prescribed. If your pain does not improve despite the medication, contact the office.    Wound care and personal care:  Apply ice compresses 15 minutes on 15 minutes off while awake for the first 2 days after surgery, then switch to warm compresses 4 times a day until seen by your physician.   For warm packs you can place a cup of dry uncooked rice in a clean cotton sock. Place sock in microwave 30 seconds to one minute. Next place the warm sock into a plastic bag and wrap the bag with clean warm wet washcloth and place over operated eye.    You may shower or wash your hair the day after surgery. Do not bathe or go swimming for 1 week to prevent contamination of your wounds.  Do not apply make-up to the eyes or eyelids for 2 weeks after surgery.    Activity restrictions and driving:  Avoid heavy lifting, bending, exercise or strenuous activity for 1 week after surgery.  You may resume other activities and return to work as tolerated.  You may not resume driving until have you stopped using narcotic pain medications(such as Norco, Percocet, Tylenol #3).    Medications:  Restart all your regular home medications and eye drops today. If you take Plavix or Aspirin on a regular basis, wait for 3 days after your surgery before restarting these in order to  decrease the risk of bleeding complications.  Avoid aspirin and aspirin-like medications (Motrin, Aleve, Ibuprofen, [place] etc) for 5 days to reduce the risk of bleeding. You may take Tylenol (acetaminophen) for pain.  In addition to your home medications, take the following post-operative medications as prescribed by your physician:  Instill eye drops (Maxitrol) four times a day until the bottle finished. *    Contact information and follow-up:  Return to the Eye Clinic for a follow-up appointment with your physician as  scheduled. If no appointment has been scheduled, call [phone] for an  appointment with Dr. Gomez within 1 to 2 weeks from your date of surgery.  -     Please email a few photos of your eye(s) or other operative site(s) to [email or web address] prior to your follow up visit.    For severe pain, bleeding, or loss of vision, call the Eye Clinic at [phone].  After hours or on weekends and holidays, call [phone] and ask to speak with the ophthalmologist on call.        Same-Day Surgery   Adult Discharge Orders & Instructions     For 24 hours after surgery:  Get plenty of rest.  A responsible adult must stay with you for at least 24 hours after you leave the hospital.   Pain medication can slow your reflexes. Do not drive or use heavy equipment.  If you have weakness or tingling, don't drive or use heavy equipment until this feeling goes away.  Mixing alcohol and pain medication can cause dizziness and slow your breathing. It can even be fatal. Do not drink alcohol while taking pain medication.  Avoid strenuous or risky activities.  Ask for help when climbing stairs.   You may feel lightheaded.  If so, sit for a few minutes before standing.  Have someone help you get up.   If you have nausea (feel sick to your stomach), drink only clear liquids such as apple juice, ginger ale, broth or 7-Up.  Rest may also help.  Be sure to drink enough fluids.  Move to a regular diet as you  feel able. Take pain medications with a small amount of solid food, such as toast or crackers, to avoid nausea.   A slight fever is normal. Call the doctor if your fever is over 100 F (37.7 C) (taken under the tongue) or lasts longer than 24 hours.  You may have a dry mouth, muscle aches, trouble sleeping or a sore throat.  These symptoms should go away after 24 hours.  Do not make important or legal decisions.   Pain Management:      1. Take pain medication (if prescribed) for pain as directed by your physician.        2. WARNING: If the pain medication you have been prescribed contains Tylenol  (acetaminophen), DO NOT take additional doses of Tylenol (acetaminophen).     Call your doctor for any of the followin.  Signs of infection (fever, growing tenderness at the surgery site, severe pain, a large amount of drainage or bleeding, foul-smelling drainage, redness, swelling).    2.  It has been over 8 to 10 hours since surgery and you are still not able to urinate (pee).    3.  Headache for over 24 hours.    4.  Numbness, tingling or weakness the day after surgery (if you had spinal anesthesia).  To contact a doctor, call Dr Gomez's office at [phone]--Opthamology Clinic or [phone] [facility] or:  ' [phone] and ask for the Resident On Call for:          Ophthalmology   (answered 24 hours a day)  '   Emergency Department:  [place] [facility]: [phone]  [place] [facility]: [phone]               Rev. 10/2014     (0) independent

## 2023-06-28 NOTE — BRIEF OP NOTE
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  Complications: None.  Implants: * No implants in log *

## 2023-07-10 ENCOUNTER — TELEPHONE (OUTPATIENT)
Dept: OPHTHALMOLOGY | Facility: CLINIC | Age: 78
End: 2023-07-10

## 2023-07-10 ENCOUNTER — OFFICE VISIT (OUTPATIENT)
Dept: OPHTHALMOLOGY | Facility: CLINIC | Age: 78
End: 2023-07-10
Payer: COMMERCIAL

## 2023-07-10 DIAGNOSIS — C69.01 MALIGNANT MELANOMA OF CONJUNCTIVA, RIGHT (H): Primary | ICD-10-CM

## 2023-07-10 DIAGNOSIS — Z98.890 POSTOPERATIVE EYE STATE: ICD-10-CM

## 2023-07-10 PROCEDURE — 99024 POSTOP FOLLOW-UP VISIT: CPT | Performed by: OPHTHALMOLOGY

## 2023-07-10 ASSESSMENT — TONOMETRY
OD_IOP_MMHG: 10
IOP_METHOD: ICARE
OS_IOP_MMHG: 12

## 2023-07-10 ASSESSMENT — VISUAL ACUITY
OS_CC: 20/30
OD_CC+: -1
METHOD: SNELLEN - LINEAR
OS_CC+: -2
OD_CC: 20/60
CORRECTION_TYPE: GLASSES

## 2023-07-10 NOTE — TELEPHONE ENCOUNTER
LVM for patient regarding scheduling a Return in about 3 months (around 10/10/2023) for [facility]. Provided Eye Clinic and direct number for scheduling options and made a Recall Letter.

## 2023-07-10 NOTE — PATIENT INSTRUCTIONS
Artificial Tears 4-5x/day:  Systane  Refresh  Tears Naturale  Genteal  Optive  Soothe  Hypotears    All of these are good products.    DO NOT USE VISINE OR CLEAR EYES!

## 2023-07-10 NOTE — NURSING NOTE
Chief Complaints and History of Present Illnesses   Patient presents with     Post Op (Ophthalmology) Right Eye     Pt here for POW#2 s/p RLL reconstruction with Early flap for BCC. DOS 06/28/2023.      Chief Complaint(s) and History of Present Illness(es)     Post Op (Ophthalmology) Right Eye            Laterality: right eye    Associated symptoms: Negative for eye pain    Comments: Pt here for POW#2 s/p RLL reconstruction with Early flap for BCC. DOS 06/28/2023.           Comments    Pt has intermittent sharp pain, but does note he has BAKARI. He is using gala and tears. Pt also has concerns about the redness of his eye and his depth perception.   Melissa Fry, COA on 7/10/2023 at 1:09 PM

## 2023-09-26 ENCOUNTER — TRANSCRIBE ORDERS (OUTPATIENT)
Dept: OTHER | Age: 78
End: 2023-09-26

## 2023-09-26 DIAGNOSIS — H25.813 COMBINED FORMS OF AGE-RELATED CATARACT, BILATERAL: Primary | ICD-10-CM

## 2023-10-13 ENCOUNTER — OFFICE VISIT (OUTPATIENT)
Dept: OPHTHALMOLOGY | Facility: CLINIC | Age: 78
End: 2023-10-13
Payer: COMMERCIAL

## 2023-10-13 DIAGNOSIS — H25.813 COMBINED FORMS OF AGE-RELATED CATARACT, BILATERAL: ICD-10-CM

## 2023-10-13 DIAGNOSIS — C43.112 MALIGNANT MELANOMA OF RIGHT LOWER EYELID INCLUDING CANTHUS (H): Primary | ICD-10-CM

## 2023-10-13 PROCEDURE — 99207 PR NO CHARGE LOS: CPT | Performed by: STUDENT IN AN ORGANIZED HEALTH CARE EDUCATION/TRAINING PROGRAM

## 2023-10-13 ASSESSMENT — SLIT LAMP EXAM - LIDS: COMMENTS: 1+ MEIBOMIAN GLAND DYSFUNCTION

## 2023-10-13 ASSESSMENT — TONOMETRY
OD_IOP_MMHG: 16
IOP_METHOD: APPLANATION
OS_IOP_MMHG: 16

## 2023-10-13 ASSESSMENT — REFRACTION_MANIFEST
OS_CYLINDER: +2.25
OD_CYLINDER: +1.75
OS_AXIS: 003
OS_SPHERE: -1.25
OD_SPHERE: -2.00
OD_ADD: +2.75
OS_ADD: +2.75
OD_AXIS: 011

## 2023-10-13 ASSESSMENT — REFRACTION_WEARINGRX
OD_CYLINDER: +2.50
SPECS_TYPE: PAL
OS_SPHERE: -1.75
OS_ADD: +2.50
OD_AXIS: 177
OS_CYLINDER: +2.00
OD_SPHERE: -1.75
OS_AXIS: 177
OD_ADD: +2.50

## 2023-10-13 ASSESSMENT — VISUAL ACUITY
OS_CC+: -2
OD_PH_CC: 20/80
OD_CC: 20/100
METHOD: SNELLEN - LINEAR
OS_CC: 20/30
CORRECTION_TYPE: GLASSES
OS_CC: J5-
OD_CC: J10

## 2023-10-13 ASSESSMENT — EXTERNAL EXAM - LEFT EYE: OS_EXAM: NORMAL

## 2023-10-13 NOTE — PATIENT INSTRUCTIONS
Referral to the [place] for consideration of cataract surgery right eye    Suly Li MD  [phone]

## 2023-10-13 NOTE — PROGRESS NOTES
Current Eye Medications:  refresh both eyes three times a day or more.  Erythromycin ointment every evening.       Subjective:  A Doctor at the [facility] recommended he see Dr. Li for a cataract evaluation.  Patient complains of gradual decreasing reading vision in each eye.  He is no longer able to see to read.      History of glaucoma laser, right eye, for elevated pressure.    History of malignant melanoma of conjunctiva, right eye, excised by Dr. Gomez.  Including eyelid reconstruction.   He states he has had cancer surgeries around his left eye.       Objective:  See Ophthalmology Exam.      Should have been scheduled at the  with comprehensive docs there.   Intraocular pressure 16/16 today. Selected laser trabeculoplasty (SLT) right eye?      Assessment:  Arthur Garcia is a 78 year old male who presents with:   Encounter Diagnoses   Name Primary?    H/o malignant melanoma of right lower eyelid including canthus s/p repair w/Early flap (AH) Looking great - resulting ectropion Right lower lid - has follow up with Dr. Gomez next month.      Combined forms of age-related cataract, bilateral He desires to be seen at the [place] for cataract surgery. Flomax.       Plan:  Referral to the [place] for consideration of cataract surgery right eye    Suly Li MD  [phone]

## 2023-10-13 NOTE — LETTER
10/13/2023         RE: Arthur Garcia  [address] [facility] [address]        Dear Colleague,    Thank you for referring your patient, Arthur Garcia, to the [facility]. Please see a copy of my visit note below.     Current Eye Medications:  refresh both eyes three times a day or more.  Erythromycin ointment every evening.       Subjective:  [facility] see Dr. Li for a cataract evaluation.  Patient complains of gradual decreasing reading vision in each eye.  He is no longer able to see to read.      History of glaucoma laser, right eye, for elevated pressure.    History of malignant melanoma of conjunctiva, right eye, excised by Dr. Gomez.  Including eyelid reconstruction.   He states he has had cancer surgeries around his left eye.       Objective:  See Ophthalmology Exam.      Should have been scheduled at the  with comprehensive docs there.   Intraocular pressure 16/16 today. Selected laser trabeculoplasty (SLT) right eye?      Assessment:  Arthur Garcia is a 78 year old male who presents with:   Encounter Diagnoses   Name Primary?     H/o malignant melanoma of right lower eyelid including canthus s/p repair w/Early flap (AH) Looking great - resulting ectropion Right lower lid - has follow up with Dr. Gomez next month.       Combined forms of age-related cataract, bilateral He desires to be seen at the [place] for cataract surgery. Flomax.       Plan:  Referral to the [place] for consideration of cataract surgery right eye    Suly Li MD  [phone]     Again, thank you for allowing me to participate in the care of your patient.        Sincerely,        Suly Li MD

## 2023-10-16 ENCOUNTER — OFFICE VISIT (OUTPATIENT)
Dept: OPHTHALMOLOGY | Facility: CLINIC | Age: 78
End: 2023-10-16
Payer: COMMERCIAL

## 2023-10-16 DIAGNOSIS — H02.9 LESION OF RIGHT UPPER EYELID: ICD-10-CM

## 2023-10-16 DIAGNOSIS — C43.112 MALIGNANT MELANOMA OF RIGHT LOWER EYELID INCLUDING CANTHUS (H): Primary | ICD-10-CM

## 2023-10-16 DIAGNOSIS — H02.103 ECTROPION DUE TO LAXITY OF EYELID, RIGHT: ICD-10-CM

## 2023-10-16 DIAGNOSIS — Z98.890 POSTOPERATIVE EYE STATE: ICD-10-CM

## 2023-10-16 PROCEDURE — 99214 OFFICE O/P EST MOD 30 MIN: CPT | Mod: GC | Performed by: OPHTHALMOLOGY

## 2023-10-16 ASSESSMENT — VISUAL ACUITY
OD_CC+: -1
OD_CC: 20/50
CORRECTION_TYPE: GLASSES
METHOD: SNELLEN - LINEAR
OS_CC: 20/25
OS_CC+: -3

## 2023-10-16 ASSESSMENT — TONOMETRY
OS_IOP_MMHG: 11
OD_IOP_MMHG: 10
IOP_METHOD: ICARE

## 2023-10-16 ASSESSMENT — EXTERNAL EXAM - LEFT EYE: OS_EXAM: NORMAL

## 2023-10-16 NOTE — NURSING NOTE
"Chief Complaints and History of Present Illnesses   Patient presents with    Follow Up     Pt here for follow up on S/p RLL reconstruction with Early flap for BCC DOS 06/28/2023.      Chief Complaint(s) and History of Present Illness(es)       Follow Up              Comments: Pt here for follow up on S/p RLL reconstruction with Early flap for BCC DOS 06/28/2023.               Comments    Pt notes eye is doing ok. He feels it is a bit \"floppy\".  Pt states his Dr wants the floppy fixed before they do cataract sx.     Melissa Fry, COA on 10/16/2023 at 8:59 AM                     "

## 2023-10-16 NOTE — PROGRESS NOTES
"Chief Complaints and History of Present Illnesses   Patient presents with    Follow Up     Pt here for follow up on S/p RLL reconstruction with Early flap for BCC DOS 06/28/2023.      Chief Complaint(s) and History of Present Illness(es)     Follow Up     Additional comments: Pt here for follow up on S/p RLL reconstruction with   Early flap for BCC DOS 06/28/2023.            Comments    Pt notes eye is doing ok. He feels it is a bit \"floppy\".  Pt states his Dr   wants the floppy fixed before they do cataract sx.     Melissa Fry, COA on 10/16/2023 at 8:59 AM           Assessment & Plan     Arthur Garcia is a 78 year old male with the following diagnoses:   1. H/o malignant melanoma of right lower eyelid including canthus s/p repair w/Early flap (AH)    2. Postoperative eye state    3. 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 Lesion of right upper eyelid       PLAN:  Right lower lid ectropion repair (lateral tarsal strip)  Excision of Right upper lid lesion          Attending Physician Attestation:  Complete documentation of historical and exam elements from today's encounter can be found in the full encounter summary report (not reduplicated in this progress note).  I personally obtained the chief complaint(s) and history of present illness.  I confirmed and edited as necessary the review of systems, past medical/surgical history, family history, social history, and examination findings as documented by others; and I examined the patient myself.  I personally reviewed the relevant tests, images, and reports as documented above.  I formulated and edited as necessary the assessment and plan and discussed the findings and management plan with the patient and family.   -Keven Gomez MD  9:26 AM 10/16/2023    Today with Arthur Garcia, I reviewed the indications, risks, benefits, and alternatives of the proposed surgical procedure including, but not limited to, failure obtain the desired result  and " need for additional surgery, bleeding, infection, loss of vision, loss of the eye, and the remote possibility of permanent damage to any organ system or death with the use of anesthesia.  I provided multiple opportunities for the questions, answered all questions to the best of my ability, and confirmed that my answers and my discussion were understood.     - Keven Gomez MD 9:37 AM 10/16/2023

## 2023-10-17 ENCOUNTER — TELEPHONE (OUTPATIENT)
Dept: OPHTHALMOLOGY | Facility: CLINIC | Age: 78
End: 2023-10-17

## 2023-10-17 NOTE — TELEPHONE ENCOUNTER
Spoke with patient to schedule surgery. Dr Gomez next [facility] date is in Feb 2024. Pt is very anxious about getting this done. Writer sent staff message to provider to see if there is any other dates available prior to the Feb date.  Lizabeth Francois on 10/17/2023 at 5:01 PM

## 2023-10-18 NOTE — TELEPHONE ENCOUNTER
Patient is schedule for surgery with: Dr. Gomez    Surgery Date: 11/22/23     Location: [facility]    H&P: to be completed by Primary Care team [facility]     Post-op:  12/4/23, in-person visit, with Dr Gomez    Patient will receive a phone call from pre-admission nurses 1-2 days prior to surgery with arrival and start time.    Patient aware times are subject to change up until day before surgery.     Patient questions/concerns: N/A     Surgery packet was sent via US mail on 10/18/23      Lizabeth Francois on 10/18/2023 at 12:44 PM

## 2023-11-08 LAB
CREATININE (EXTERNAL): 1.8 MG/DL (ref 0.5–1.5)
POTASSIUM (EXTERNAL): 3.4 MMOL/L (ref 3.5–5)

## 2023-11-09 ENCOUNTER — TELEPHONE (OUTPATIENT)
Dept: OPHTHALMOLOGY | Facility: CLINIC | Age: 78
End: 2023-11-09

## 2023-11-09 NOTE — TELEPHONE ENCOUNTER
Reason for Call:  Other call back    Detailed comments: Patient is calling to schedule surgery, please call patient to schedule. Thank you     Phone Number Patient can be reached at: Home number on file [phone] (home)    Best Time: any    Can we leave a detailed message on this number? YES    Call taken on 11/9/2023 at 9:50 AM by Julianna Fagan

## 2023-11-16 NOTE — TELEPHONE ENCOUNTER
Writer reached out to patient to remind them about [facility]. Pt let writer know that he had just had knee surgery on 11/15. Pt stated that [facility] was [facility] and then call writer back with an update. Pt was given direct call back number of [phone].  Lizabeth Francois on 11/16/2023 at 10:55 AM

## 2023-11-18 ENCOUNTER — TRANSFERRED RECORDS (OUTPATIENT)
Dept: HEALTH INFORMATION MANAGEMENT | Facility: CLINIC | Age: 78
End: 2023-11-18

## 2023-11-18 LAB
ALT SERPL-CCNC: 29 U/L (ref 0–55)
ALT SERPL-CCNC: 29 U/L (ref 0–55)
AST SERPL-CCNC: 20 U/L (ref 5–40)
AST SERPL-CCNC: 20 U/L (ref 5–40)
CREATININE (EXTERNAL): 1.6 MG/DL (ref 0.5–1.5)
CREATININE (EXTERNAL): 17 MG/DL (ref 5–23)
GFR ESTIMATED (EXTERNAL): 45 ML/MIN/1.73M2
GFR ESTIMATED (EXTERNAL): 45 ML/MIN/1.73M2
GLUCOSE (EXTERNAL): 113 MG/DL (ref 70–180)
GLUCOSE (EXTERNAL): 113 MG/DL (ref 70–180)
POTASSIUM (EXTERNAL): 3.5 MMOL/L (ref 3.5–5)
POTASSIUM (EXTERNAL): 3.5 MMOL/L (ref 3.5–5)

## 2023-11-22 ENCOUNTER — ANESTHESIA (OUTPATIENT)
Dept: SURGERY | Facility: CLINIC | Age: 78
End: 2023-11-22
Payer: COMMERCIAL

## 2023-11-22 ENCOUNTER — ANESTHESIA EVENT (OUTPATIENT)
Dept: SURGERY | Facility: CLINIC | Age: 78
End: 2023-11-22
Payer: COMMERCIAL

## 2023-11-22 ENCOUNTER — HOSPITAL ENCOUNTER (OUTPATIENT)
Facility: CLINIC | Age: 78
Discharge: HOME | End: 2023-11-22
Attending: OPHTHALMOLOGY | Admitting: OPHTHALMOLOGY
Payer: COMMERCIAL

## 2023-11-22 VITALS
BODY MASS INDEX: 53.78 KG/M2 | RESPIRATION RATE: 18 BRPM | SYSTOLIC BLOOD PRESSURE: 159 MMHG | WEIGHT: 315 LBS | HEIGHT: 64 IN | DIASTOLIC BLOOD PRESSURE: 105 MMHG | HEART RATE: 60 BPM | TEMPERATURE: 98.1 F | OXYGEN SATURATION: 97 %

## 2023-11-22 DIAGNOSIS — Z98.890 POSTOPERATIVE EYE STATE: Primary | ICD-10-CM

## 2023-11-22 PROCEDURE — 250N000011 HC RX IP 250 OP 636

## 2023-11-22 PROCEDURE — 370N000017 HC ANESTHESIA TECHNICAL FEE, PER MIN: Performed by: OPHTHALMOLOGY

## 2023-11-22 PROCEDURE — 258N000003 HC RX IP 258 OP 636

## 2023-11-22 PROCEDURE — 88305 TISSUE EXAM BY PATHOLOGIST: CPT | Mod: 26 | Performed by: OPHTHALMOLOGY

## 2023-11-22 PROCEDURE — 250N000009 HC RX 250

## 2023-11-22 PROCEDURE — 250N000009 HC RX 250: Performed by: OPHTHALMOLOGY

## 2023-11-22 PROCEDURE — 272N000001 HC OR GENERAL SUPPLY STERILE: Performed by: OPHTHALMOLOGY

## 2023-11-22 PROCEDURE — 88305 TISSUE EXAM BY PATHOLOGIST: CPT | Mod: TC | Performed by: OPHTHALMOLOGY

## 2023-11-22 PROCEDURE — 999N000141 HC STATISTIC PRE-PROCEDURE NURSING ASSESSMENT: Performed by: OPHTHALMOLOGY

## 2023-11-22 PROCEDURE — 67917 REPAIR EYELID DEFECT: CPT | Mod: E4 | Performed by: OPHTHALMOLOGY

## 2023-11-22 PROCEDURE — 250N000013 HC RX MED GY IP 250 OP 250 PS 637: Performed by: ANESTHESIOLOGY

## 2023-11-22 PROCEDURE — 67840 REMOVE EYELID LESION: CPT | Mod: E3 | Performed by: OPHTHALMOLOGY

## 2023-11-22 PROCEDURE — 710N000012 HC RECOVERY PHASE 2, PER MINUTE: Performed by: OPHTHALMOLOGY

## 2023-11-22 PROCEDURE — 360N000076 HC SURGERY LEVEL 3, PER MIN: Performed by: OPHTHALMOLOGY

## 2023-11-22 RX ORDER — LIDOCAINE HYDROCHLORIDE 20 MG/ML
INJECTION, SOLUTION INFILTRATION; PERINEURAL PRN
Status: DISCONTINUED | OUTPATIENT
Start: 2023-11-22 | End: 2023-11-22

## 2023-11-22 RX ORDER — NEOMYCIN POLYMYXIN B SULFATES AND DEXAMETHASONE 3.5; 10000; 1 MG/ML; [USP'U]/ML; MG/ML
SUSPENSION/ DROPS OPHTHALMIC
Qty: 5 ML | Refills: 0 | Status: SHIPPED | OUTPATIENT
Start: 2023-11-22

## 2023-11-22 RX ORDER — FENTANYL CITRATE 50 UG/ML
25 INJECTION, SOLUTION INTRAMUSCULAR; INTRAVENOUS EVERY 5 MIN PRN
Status: DISCONTINUED | OUTPATIENT
Start: 2023-11-22 | End: 2023-11-22 | Stop reason: HOSPADM

## 2023-11-22 RX ORDER — DEXAMETHASONE SODIUM PHOSPHATE 4 MG/ML
4 INJECTION, SOLUTION INTRA-ARTICULAR; INTRALESIONAL; INTRAMUSCULAR; INTRAVENOUS; SOFT TISSUE
Status: DISCONTINUED | OUTPATIENT
Start: 2023-11-22 | End: 2023-11-22 | Stop reason: HOSPADM

## 2023-11-22 RX ORDER — PROPOFOL 10 MG/ML
INJECTION, EMULSION INTRAVENOUS PRN
Status: DISCONTINUED | OUTPATIENT
Start: 2023-11-22 | End: 2023-11-22

## 2023-11-22 RX ORDER — DIAZEPAM 10 MG/2ML
2.5 INJECTION, SOLUTION INTRAMUSCULAR; INTRAVENOUS
Status: DISCONTINUED | OUTPATIENT
Start: 2023-11-22 | End: 2023-11-22 | Stop reason: HOSPADM

## 2023-11-22 RX ORDER — HALOPERIDOL 5 MG/ML
1 INJECTION INTRAMUSCULAR
Status: DISCONTINUED | OUTPATIENT
Start: 2023-11-22 | End: 2023-11-22 | Stop reason: HOSPADM

## 2023-11-22 RX ORDER — TETRACAINE HYDROCHLORIDE 5 MG/ML
SOLUTION OPHTHALMIC PRN
Status: DISCONTINUED | OUTPATIENT
Start: 2023-11-22 | End: 2023-11-22 | Stop reason: HOSPADM

## 2023-11-22 RX ORDER — HYDROXYZINE HYDROCHLORIDE 10 MG/1
10 TABLET, FILM COATED ORAL EVERY 6 HOURS PRN
Status: DISCONTINUED | OUTPATIENT
Start: 2023-11-22 | End: 2023-11-22 | Stop reason: HOSPADM

## 2023-11-22 RX ORDER — SODIUM CHLORIDE, SODIUM LACTATE, POTASSIUM CHLORIDE, CALCIUM CHLORIDE 600; 310; 30; 20 MG/100ML; MG/100ML; MG/100ML; MG/100ML
INJECTION, SOLUTION INTRAVENOUS CONTINUOUS PRN
Status: DISCONTINUED | OUTPATIENT
Start: 2023-11-22 | End: 2023-11-22

## 2023-11-22 RX ORDER — HYDROMORPHONE HYDROCHLORIDE 1 MG/ML
0.2 INJECTION, SOLUTION INTRAMUSCULAR; INTRAVENOUS; SUBCUTANEOUS EVERY 5 MIN PRN
Status: DISCONTINUED | OUTPATIENT
Start: 2023-11-22 | End: 2023-11-22 | Stop reason: HOSPADM

## 2023-11-22 RX ORDER — FENTANYL CITRATE 50 UG/ML
50 INJECTION, SOLUTION INTRAMUSCULAR; INTRAVENOUS EVERY 5 MIN PRN
Status: DISCONTINUED | OUTPATIENT
Start: 2023-11-22 | End: 2023-11-22 | Stop reason: HOSPADM

## 2023-11-22 RX ORDER — OXYCODONE HYDROCHLORIDE 10 MG/1
10 TABLET ORAL
Status: DISCONTINUED | OUTPATIENT
Start: 2023-11-22 | End: 2023-11-22 | Stop reason: HOSPADM

## 2023-11-22 RX ORDER — ONDANSETRON 2 MG/ML
INJECTION INTRAMUSCULAR; INTRAVENOUS PRN
Status: DISCONTINUED | OUTPATIENT
Start: 2023-11-22 | End: 2023-11-22

## 2023-11-22 RX ORDER — LABETALOL HYDROCHLORIDE 5 MG/ML
10 INJECTION, SOLUTION INTRAVENOUS
Status: DISCONTINUED | OUTPATIENT
Start: 2023-11-22 | End: 2023-11-22 | Stop reason: HOSPADM

## 2023-11-22 RX ORDER — OXYCODONE HYDROCHLORIDE 5 MG/1
5 TABLET ORAL EVERY 6 HOURS PRN
Qty: 12 TABLET | Refills: 0 | Status: SHIPPED | OUTPATIENT
Start: 2023-11-22 | End: 2023-11-25

## 2023-11-22 RX ORDER — KETOROLAC TROMETHAMINE 30 MG/ML
15 INJECTION, SOLUTION INTRAMUSCULAR; INTRAVENOUS
Status: DISCONTINUED | OUTPATIENT
Start: 2023-11-22 | End: 2023-11-22 | Stop reason: HOSPADM

## 2023-11-22 RX ORDER — ALBUTEROL SULFATE 0.83 MG/ML
2.5 SOLUTION RESPIRATORY (INHALATION) EVERY 4 HOURS PRN
Status: DISCONTINUED | OUTPATIENT
Start: 2023-11-22 | End: 2023-11-22 | Stop reason: HOSPADM

## 2023-11-22 RX ORDER — MEPERIDINE HYDROCHLORIDE 25 MG/ML
12.5 INJECTION INTRAMUSCULAR; INTRAVENOUS; SUBCUTANEOUS EVERY 5 MIN PRN
Status: DISCONTINUED | OUTPATIENT
Start: 2023-11-22 | End: 2023-11-22 | Stop reason: HOSPADM

## 2023-11-22 RX ORDER — ERYTHROMYCIN 5 MG/G
OINTMENT OPHTHALMIC
Qty: 3.5 G | Refills: 1 | Status: SHIPPED | OUTPATIENT
Start: 2023-11-22

## 2023-11-22 RX ORDER — HYDROMORPHONE HYDROCHLORIDE 1 MG/ML
0.4 INJECTION, SOLUTION INTRAMUSCULAR; INTRAVENOUS; SUBCUTANEOUS EVERY 5 MIN PRN
Status: DISCONTINUED | OUTPATIENT
Start: 2023-11-22 | End: 2023-11-22 | Stop reason: HOSPADM

## 2023-11-22 RX ORDER — ONDANSETRON 4 MG/1
4 TABLET, ORALLY DISINTEGRATING ORAL EVERY 30 MIN PRN
Status: DISCONTINUED | OUTPATIENT
Start: 2023-11-22 | End: 2023-11-22 | Stop reason: HOSPADM

## 2023-11-22 RX ORDER — ONDANSETRON 2 MG/ML
4 INJECTION INTRAMUSCULAR; INTRAVENOUS EVERY 30 MIN PRN
Status: DISCONTINUED | OUTPATIENT
Start: 2023-11-22 | End: 2023-11-22 | Stop reason: HOSPADM

## 2023-11-22 RX ORDER — OXYCODONE HYDROCHLORIDE 5 MG/1
5 TABLET ORAL
Status: COMPLETED | OUTPATIENT
Start: 2023-11-22 | End: 2023-11-22

## 2023-11-22 RX ORDER — ACETAMINOPHEN 325 MG/1
650 TABLET ORAL ONCE
Status: COMPLETED | OUTPATIENT
Start: 2023-11-22 | End: 2023-11-22

## 2023-11-22 RX ORDER — LIDOCAINE HYDROCHLORIDE AND EPINEPHRINE 10; 10 MG/ML; UG/ML
INJECTION, SOLUTION INFILTRATION; PERINEURAL PRN
Status: DISCONTINUED | OUTPATIENT
Start: 2023-11-22 | End: 2023-11-22 | Stop reason: HOSPADM

## 2023-11-22 RX ORDER — FENTANYL CITRATE 50 UG/ML
25 INJECTION, SOLUTION INTRAMUSCULAR; INTRAVENOUS
Status: DISCONTINUED | OUTPATIENT
Start: 2023-11-22 | End: 2023-11-22 | Stop reason: HOSPADM

## 2023-11-22 RX ORDER — SODIUM CHLORIDE, SODIUM LACTATE, POTASSIUM CHLORIDE, CALCIUM CHLORIDE 600; 310; 30; 20 MG/100ML; MG/100ML; MG/100ML; MG/100ML
INJECTION, SOLUTION INTRAVENOUS CONTINUOUS
Status: DISCONTINUED | OUTPATIENT
Start: 2023-11-22 | End: 2023-11-22 | Stop reason: HOSPADM

## 2023-11-22 RX ADMIN — LIDOCAINE HYDROCHLORIDE 40 MG: 20 INJECTION, SOLUTION INFILTRATION; PERINEURAL at 15:41

## 2023-11-22 RX ADMIN — ACETAMINOPHEN 650 MG: 325 TABLET, FILM COATED ORAL at 17:28

## 2023-11-22 RX ADMIN — ONDANSETRON 4 MG: 2 INJECTION INTRAMUSCULAR; INTRAVENOUS at 15:58

## 2023-11-22 RX ADMIN — PROPOFOL 60 MG: 10 INJECTION, EMULSION INTRAVENOUS at 15:42

## 2023-11-22 RX ADMIN — SODIUM CHLORIDE, POTASSIUM CHLORIDE, SODIUM LACTATE AND CALCIUM CHLORIDE: 600; 310; 30; 20 INJECTION, SOLUTION INTRAVENOUS at 15:37

## 2023-11-22 RX ADMIN — OXYCODONE HYDROCHLORIDE 5 MG: 5 TABLET ORAL at 17:26

## 2023-11-22 RX ADMIN — PROPOFOL 30 MG: 10 INJECTION, EMULSION INTRAVENOUS at 15:43

## 2023-11-22 ASSESSMENT — ACTIVITIES OF DAILY LIVING (ADL)
ADLS_ACUITY_SCORE: 35
ADLS_ACUITY_SCORE: 36
ADLS_ACUITY_SCORE: 36

## 2023-11-22 ASSESSMENT — COPD QUESTIONNAIRES: COPD: 1

## 2023-11-22 NOTE — ANESTHESIA PREPROCEDURE EVALUATION
Anesthesia Pre-Procedure Evaluation    Patient: Arthur Garcia   MRN: [id number] : 1945        Procedure : Procedure(s):  REPAIR, ECTROPION, RIGHT LOWER EYELID  EXCISION, NEOPLASM, RIGHT UPPER EYELID          Past Medical History:   Diagnosis Date    Arthritis     Depression     Disorder of lipoprotein and lipid metabolism     Gout     Hypertension     Sleep apnea       Past Surgical History:   Procedure Laterality Date    APPENDECTOMY  2000    BIOPSY NODE SENTINEL Right 2023    Procedure: SENTINAL LYMPH  NODE BIOPSY;  Surgeon: Jose Guadalupe Warner MD;  Location: [facility] OR    CHOLECYSTECTOMY   or     EXCISE LESION CONJUNCTIVAL Right 2023    Procedure: wide local excision of right conjuntivia lesion;  Surgeon: Keven Gomez MD;  Location: [facility] OR    EXCISE TUMOR EYELID Right 2023    Procedure: Wide Excision of Right Lower eyelid melanoma;  Surgeon: Keven Gomez MD;  Location: [facility] OR    PLACEMENT, AMNIOTIC MEMBRANE GRAFT Right 2023    Procedure: PLACEMENT, AMNIOTIC MEMBRANE GRAFT;  Surgeon: Keven Gomez MD;  Location: [facility] OR    MS TRABECULOPLASTY BY LASER SURGERY      RECONSTRUCT EYELID Right 2023    Procedure: SECOND STAGE RECONSTRUCTION, RIGHT LOWER EYELID;  Surgeon: Keven Gomez MD;  [facility]: [facility]    RECONSTRUCT EYELID WITH POSTAURICULAR SKIN GRAFT Right 2023    Procedure: right RECONSTRUCTION EYELID, MEDIAL AND LATERAL BIOPSY;  Surgeon: Keven Gomez MD;  Location: [facility] OR      Allergies   Allergen Reactions    Atorvastatin Muscle Pain (Myalgia)    Codeine Hives    Simvastatin Muscle Pain (Myalgia)     Other reaction(s): Muscle pain, Joint pain    Medical Adhesive Remover Rash     Severely allergic to Coban - causing severe itching    Propoxyphene Rash and Swelling      Social History     Tobacco Use    Smoking status: Never    Smokeless tobacco: Never   Substance Use Topics    Alcohol use: No      Wt Readings from Last 1 Encounters:   23 (!)  "154.8 kg (341 lb 4.4 oz)        Anesthesia Evaluation   Pt has had prior anesthetic. Type: General.        ROS/MED HX  ENT/Pulmonary:     (+) sleep apnea, uses CPAP,                   asthma    COPD,              Neurologic:  - neg neurologic ROS     Cardiovascular: Comment: Sinus rhythm with 1st degree heart block    (+)  hypertension- -   -  - -                                      METS/Exercise Tolerance:     Hematologic:  - neg hematologic  ROS     Musculoskeletal: Comment: Fibromyalgia, Gout      GI/Hepatic:       Renal/Genitourinary:     (+) renal disease, type: CRI,            Endo:     (+)               Obesity,       Psychiatric/Substance Use: Comment: PTSD      Infectious Disease:       Malignancy: Comment: Melanoma s/p wide local excision      Other:            Physical Exam    Airway        Mallampati: II   TM distance: > 3 FB   Neck ROM: full   Mouth opening: > 3 cm    Respiratory Devices and Support         Dental       (+) Minor Abnormalities - some fillings, tiny chips      Cardiovascular   cardiovascular exam normal          Pulmonary   pulmonary exam normal                OUTSIDE LABS:  CBC: No results found for: \"WBC\", \"HGB\", \"HCT\", \"PLT\"  BMP:   Lab Results   Component Value Date    CR 1.7 (H) 01/02/2023     (H) 02/13/2023     COAGS: No results found for: \"PTT\", \"INR\", \"FIBR\"  POC: No results found for: \"BGM\", \"HCG\", \"HCGS\"  HEPATIC: No results found for: \"ALBUMIN\", \"PROTTOTAL\", \"ALT\", \"AST\", \"GGT\", \"ALKPHOS\", \"BILITOTAL\", \"BILIDIRECT\", \"KAELYN\"  OTHER: No results found for: \"PH\", \"LACT\", \"A1C\", \"MACK\", \"PHOS\", \"MAG\", \"LIPASE\", \"AMYLASE\", \"TSH\", \"T4\", \"T3\", \"CRP\", \"SED\"    Anesthesia Plan    ASA Status:  3    NPO Status:  NPO Appropriate    Anesthesia Type: MAC.     - Reason for MAC: immobility needed, straight local not clinically adequate   Induction: Intravenous.   Maintenance: TIVA.        Consents    Anesthesia Plan(s) and associated risks, benefits, and realistic alternatives " discussed. Questions answered and patient/representative(s) expressed understanding.     - Discussed: Risks, Benefits and Alternatives for BOTH SEDATION and the PROCEDURE were discussed     - Discussed with:  Patient      - Extended Intubation/Ventilatory Support Discussed: No.      - Patient is DNR/DNI Status: No     Use of blood products discussed: No .     Postoperative Care    Pain management: IV analgesics.   PONV prophylaxis: Ondansetron (or other 5HT-3)     Comments:                Kush Fernández MD

## 2023-11-22 NOTE — ANESTHESIA POSTPROCEDURE EVALUATION
Patient: Arthur Garcia    Procedure: Procedure(s):  REPAIR, ECTROPION, RIGHT LOWER EYELID  EXCISION, NEOPLASM, RIGHT UPPER EYELID       Anesthesia Type:  MAC    Note:  Disposition: Outpatient   Postop Pain Control: Uneventful            Sign Out: Well controlled pain   PONV: No   Neuro/Psych: Uneventful            Sign Out: Acceptable/Baseline neuro status   Airway/Respiratory: Uneventful            Sign Out: Acceptable/Baseline resp. status   CV/Hemodynamics: Uneventful            Sign Out: Acceptable CV status   Other NRE: NONE   DID A NON-ROUTINE EVENT OCCUR? No           Last vitals:  Vitals Value Taken Time   /105 11/22/23 1700   Temp 36.7  C (98.1  F) 11/22/23 1610   Pulse 60 11/22/23 1700   Resp 18 11/22/23 1620   SpO2 94 % 11/22/23 1702   Vitals shown include unfiled device data.    Electronically Signed By: Kenji De La Torre MD  November 22, 2023  5:22 PM

## 2023-11-22 NOTE — BRIEF OP NOTE
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]  Lesion of right upper eyelid [H02.9]  Post-operative diagnosis Same as pre-operative diagnosis    Procedure: REPAIR, ECTROPION, RIGHT LOWER EYELID, Right - Eye  EXCISION, NEOPLASM, RIGHT UPPER EYELID, Right - Eye    Surgeon: Surgeon(s) and Role:     * Keven Gomez MD - Primary     * Maria Teresa Solomon MD - Resident - Assisting     * Vanesa Henley MD - Fellow - Assisting  Anesthesia: MAC   Estimated Blood Loss: None    Drains: None  Specimens:   ID Type Source Tests Collected by Time Destination   1 : Right Upper Eyelid Lesion Tissue Eyelid, Upper, Right SURGICAL PATHOLOGY EXAM Keven Gomez MD 11/22/2023  2:32 PM      Findings:   None.  Complications: None.  Implants: * No implants in log *

## 2023-11-22 NOTE — ANESTHESIA CARE TRANSFER NOTE
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    Anesthesia Type:   MAC     Note:    Oropharynx: oropharynx clear of all foreign objects and spontaneously breathing  Level of Consciousness: awake  Oxygen Supplementation: room air    Independent Airway: airway patency satisfactory and stable  Dentition: dentition unchanged  Vital Signs Stable: post-procedure vital signs reviewed and stable  Report to RN Given: handoff report given  Patient transferred to: Phase II    Handoff Report: Identifed the Patient, Identified the Reponsible Provider, Reviewed the pertinent medical history, Discussed the surgical course, Reviewed Intra-OP anesthesia mangement and issues during anesthesia, Set expectations for post-procedure period and Allowed opportunity for questions and acknowledgement of understanding      Vitals:  Vitals Value Taken Time   /100 11/22/23 1610   Temp 36.7    Pulse 56 11/22/23 1610   Resp 16    SpO2 97 % 11/22/23 1618   Vitals shown include unfiled device data.    Electronically Signed By: MARITZA Mcfarlane CRNA  November 22, 2023  4:19 PM

## 2023-11-22 NOTE — DISCHARGE INSTRUCTIONS
Post-operative Instructions    Ophthalmic Plastic and Reconstructive Surgery  Keven Gomez M.D.  Vanesa Henley M.D.    All instructions apply to the operated eye(s) or eyelid(s)      What to expect after surgery:  There will be some swelling, bruising, and likely a black eye (even into the lower eyelids and cheeks). Also expect crusting and discharge from the eye and/or incisions.   A small amount of surface bleeding is normal for the first 48 hours after surgery.  You may notice some bloody tears for the first few days after surgery. This is normal.  Your eye(s) and eyelid(s) may be painful and tender. This is normal after surgery. Use the pain medication as prescribed. If your pain does not improve despite the medication, contact the office.    Wound care and personal care:  Apply ice compresses 15 minutes on 15 minutes off while awake for the first 2 days after surgery, then switch to warm compresses 4 times a day until seen by your physician.   For warm packs you can place a cup of dry uncooked rice in a clean cotton sock. Place sock in microwave 30 seconds to one minute. Next place the warm sock into a plastic bag and wrap the bag with clean warm wet washcloth and place over operated eye.    You may shower or wash your hair the day after surgery. Do not bathe or go swimming for 1 week to prevent contamination of your wounds.  Do not apply make-up to the eyes or eyelids for 2 weeks after surgery.    Activity restrictions and driving:  Avoid heavy lifting, bending, exercise or strenuous activity for 1 week after surgery.  You may resume other activities and return to work as tolerated.  You may not resume driving until have you stopped using narcotic pain medications(such as Norco, Percocet, Tylenol #3).    Medications:  Restart all your regular home medications and eye drops today. If you take Plavix or Aspirin on a regular basis, wait for 3 days after your surgery before restarting these in order to  decrease the risk of bleeding complications.  Avoid aspirin and aspirin-like medications (Motrin, Aleve, Ibuprofen, Mandi-Distant etc) for 5 days to reduce the risk of bleeding. You may take Tylenol (acetaminophen) for pain.  In addition to your home medications, take the following post-operative medications as prescribed by your physician:  Apply antibiotic ointment (erythromycin) to all sutures three times a day, and into the operated eye(s) at night.  Instill eye drops (Maxitrol) four times a day until the bottle finished.    Take scheduled extra strength Tylenol for pain.  You may take 1 to 2 pain pills (norco or oxycodone as prescribed) as needed for breakthrough pain up to every 6 hours.  The pain pills may make you drowsy. You must not drive a car, operate heavy machinery or drink alcohol while taking them.  The pain pills may cause constipation and nausea. Take them with some food to prevent a stomach upset. If you continue to experience nausea, call your physician.    WARNING: All the prescription pain medications listed above contain Tylenol (acetaminophen). You must not take more than 4,000 mg of acetaminophen per 24-hour period. This is equivalent to 6 tablets of Darvocet, 8 tablets of Vicodin, or 12 tablets of Norco, Percocet or Tylenol #3. If you take other over-the-counter medications containing acetaminophen, you must take the amount of acetaminophen into account and reduce the number of prescribed pain pills accordingly.    Contact information and follow-up:  Return to the Eye Clinic for a follow-up appointment with your physician as  scheduled. If no appointment has been scheduled, call [phone] for an  appointment with Dr. Gomez within 1 to 2 weeks from your date of surgery.  -     Please email a few photos of your eye(s) or other operative site(s) to [email or web address] prior to your follow up visit.    For severe pain, bleeding, or loss of vision, call the Eye Clinic at  [phone].  After hours or on weekends and holidays, call [phone] and ask to speak with the ophthalmologist on call.      Same-Day Surgery   Adult Discharge Orders & Instructions     For 24 hours after surgery:  Get plenty of rest.  A responsible adult must stay with you for at least 24 hours after you leave the hospital.   Pain medication can slow your reflexes. Do not drive or use heavy equipment.  If you have weakness or tingling, don't drive or use heavy equipment until this feeling goes away.  Mixing alcohol and pain medication can cause dizziness and slow your breathing. It can even be fatal. Do not drink alcohol while taking pain medication.  Avoid strenuous or risky activities.  Ask for help when climbing stairs.   You may feel lightheaded.  If so, sit for a few minutes before standing.  Have someone help you get up.   If you have nausea (feel sick to your stomach), drink only clear liquids such as apple juice, ginger ale, broth or 7-Up.  Rest may also help.  Be sure to drink enough fluids.  Move to a regular diet as you feel able. Take pain medications with a small amount of solid food, such as toast or crackers, to avoid nausea.   A slight fever is normal. Call the doctor if your fever is over 100 F (37.7 C) (taken under the tongue) or lasts longer than 24 hours.  You may have a dry mouth, muscle aches, trouble sleeping or a sore throat.  These symptoms should go away after 24 hours.  Do not make important or legal decisions.   Pain Management:      1. Take pain medication (if prescribed) for pain as directed by your physician.        2. WARNING: If the pain medication you have been prescribed contains Tylenol  (acetaminophen), DO NOT take additional doses of Tylenol (acetaminophen).     Call your doctor for any of the followin.  Signs of infection (fever, growing tenderness at the surgery site, severe pain, a large amount of drainage or bleeding, foul-smelling drainage, redness,  swelling).    2.  It has been over 8 to 10 hours since surgery and you are still not able to urinate (pee).    3.  Headache for over 24 hours.    4.  Numbness, tingling or weakness the day after surgery (if you had spinal anesthesia).  To contact a doctor, call _________see above___________________________ or:  '   [phone] and ask for the Resident On Call for:          ____________adult opthathamology______________________________ (answered 24 hours a day)  '   Emergency Department:  [place] [facility]: [phone]  [place] [facility]: [phone]               Rev. 10/2014

## 2023-11-23 NOTE — OP NOTE
PREOPERATIVE DIAGNOSIS: Right lower eyelid ectropion. Right upper lid lesion.   POSTOPERATIVE DIAGNOSIS: Right lower eyelid ectropion. Right upper lid lesion.   PROCEDURE:  Right lower eyelid ectropion repair by tarsal strip procedure. Right upper lid excisional biopsy.   ANESTHESIA: Monitored with local infiltration of 1% lidocaine with epinephrine [id number].   SURGEON: Keven Gomez MD.  ASSISTANT: Vanesa Henley MD, YOJANA and  Maria Teresa Solomon MD   COMPLICATIONS: None.   ESTIMATED BLOOD LOSS: Less than 5 mL.   HISTORY: Arthur Garcia  presented with tearing  due to lower lid ectropion. After the risks, benefits and alternatives to the proposed procedure were explained, informed consent was obtained.   DESCRIPTION OF PROCEDURE: Arthur Garcia was brought to the operating room and placed supine on the operating table. IV sedation was given. The lower lids and lateral canthal areas were infiltrated with local anesthetic. The area was prepped and draped in the typical fashion. Attention was directed to the right side. An 8 mm lateral canthal incision was made with a 15 blade and dissection carried down to the orbicularis with high temperature cautery. Lateral canthotomy and inferior cantholysis was performed with the cautery. A lateral tarsal strip was fashioned with the high temperature cautery and the justino scissors. The tarsal strip was secured to the lateral orbital rim periosteum with a double-armed 5-0 PDS suture in a horizontal mattress fashion. Lateral canthal angle was closed with a gray line to gray line suture of 6-0 chromic gut suture tied internally. Skin was closed with interrupted 5-0 plain fast-absorbing gut sutures.  The right upper lid lesion was elevated with a 0.5 mm Castroviejo forceps. The lesion was excised with a Justino scissors.  Hemostasis was obtained with the high temperature loop wire cautery.  The lesion was placed in formalin and sent for pathologic evaluation. Antibiotic ointment was  applied to the incisions. Arthur Garcia left the operating room in stable condition.   GAIL BOWLES MD

## 2023-12-04 ENCOUNTER — OFFICE VISIT (OUTPATIENT)
Dept: OPHTHALMOLOGY | Facility: CLINIC | Age: 78
End: 2023-12-04
Payer: COMMERCIAL

## 2023-12-04 DIAGNOSIS — H25.813 COMBINED FORMS OF AGE-RELATED CATARACT, BILATERAL: ICD-10-CM

## 2023-12-04 DIAGNOSIS — H43.811 PVD (POSTERIOR VITREOUS DETACHMENT), RIGHT EYE: ICD-10-CM

## 2023-12-04 DIAGNOSIS — Z98.890 POSTOPERATIVE EYE STATE: Primary | ICD-10-CM

## 2023-12-04 PROCEDURE — 99024 POSTOP FOLLOW-UP VISIT: CPT | Mod: GC | Performed by: OPHTHALMOLOGY

## 2023-12-04 ASSESSMENT — VISUAL ACUITY
OS_CC: 20/30
OS_CC+: -1
OD_CC: 20/70
CORRECTION_TYPE: GLASSES
METHOD: SNELLEN - LINEAR

## 2023-12-04 ASSESSMENT — TONOMETRY
OD_IOP_MMHG: 17
IOP_METHOD: ICARE
OS_IOP_MMHG: 22

## 2023-12-04 ASSESSMENT — REFRACTION_WEARINGRX
OD_ADD: +2.50
OS_ADD: +2.50
OD_AXIS: 177
OD_CYLINDER: +2.50
SPECS_TYPE: PAL
OS_CYLINDER: +2.00
OS_AXIS: 177
OS_SPHERE: -1.75
OD_SPHERE: -1.75

## 2023-12-04 ASSESSMENT — EXTERNAL EXAM - RIGHT EYE: OD_EXAM: NORMAL

## 2023-12-04 ASSESSMENT — EXTERNAL EXAM - LEFT EYE: OS_EXAM: NORMAL

## 2023-12-04 NOTE — NURSING NOTE
Chief Complaints and History of Present Illnesses   Patient presents with    Post Op (Ophthalmology) Right Eye     Post Right lower eyelid ectropion repair by tarsal strip procedure and right upper lid excisional biopsy on 11/22/2023     Chief Complaint(s) and History of Present Illness(es)       Post Op (Ophthalmology) Right Eye              Laterality: right eye    Course: gradually improving    Associated symptoms: dryness, eye pain (only when touching the temporal side of his right eye) and discharge (minimal since his surgery ( was worse prior to the procedure)).  Negative for swelling    Treatments tried: eye drops and artificial tears    Pain scale: 0/10    Comments: Post Right lower eyelid ectropion repair by tarsal strip procedure and right upper lid excisional biopsy on 11/22/2023              Comments    Patient returns for an 11 day post operative right eye exam.  He states that his right eye feels a bit dry today.  He tells me that his right eye seems less fogged since the lid procedures, but his right eye vision still seems decreased from the cataract.    Aubrie Enriquez, SRIRAM 10:04 AM  December 4, 2023

## 2023-12-04 NOTE — PATIENT INSTRUCTIONS
Continue antibiotic ointment or bland lubricating ointment (eg vaseline or aquaphor) to the incision(s) two times a day.  Gently massage along the incision(s) two times a day.  Use warm soaks over the incision(s) four times a day until swelling and bruises resolve.

## 2023-12-04 NOTE — PROGRESS NOTES
"Chief Complaints and History of Present Illnesses   Patient presents with    Post Op (Ophthalmology) Right Eye     Post Right lower eyelid ectropion repair by tarsal strip procedure and right upper lid excisional biopsy on 11/22/2023     Chief Complaint(s) and History of Present Illness(es)     Post Op (Ophthalmology) Right Eye    In right eye.  Since onset it is gradually improving.  Associated symptoms   include dryness, eye pain (only when touching the temporal side of his   right eye), floaters and discharge (minimal since his surgery ( was worse   prior to the procedure)).  Negative for swelling.  Treatments tried   include eye drops and artificial tears.  Pain was noted as 0/10.   Additional comments: Post Right lower eyelid ectropion repair by tarsal   strip procedure and right upper lid excisional biopsy on 11/22/2023    Comments    Patient returns for an 11 day post operative right eye exam.  He states   that his right eye feels a bit dry today.  He tells me that his right eye   seems less fogged since the lid procedures, but his right eye vision still   seems decreased from the cataract.    He tells me that he started seeing \"bloody\" floaters in his right eye   since the day before his lid surgery.  He has not yet seen a doctor for   the problem.    Aubrie Enriquez, COT 10:04 AM  December 4, 2023        Assessment & Plan     Arthur Garcia is a 78 year old male with the following diagnoses:   1. Postoperative eye state    2. PVD (posterior vitreous detachment), right eye    3. Combined forms of age-related cataract, bilateral       POH: cataracts each eye, ?SLT right eye, malignant melanoma of RLL including canthus s/p repair w/ Early flap     Small, black, mobile floaters in right eye present for several months (including pre-operatively). No new flashes or curtain.   PVD, right eye  No retinal holes, tears or detachment of either eye on exam. The patient was educated on PVD as the etiology of his visual " floaters.  - Retinal detachment precautions reviewed; advised to return to clinic as soon as possible if a sudden increase in visual floaters, flashes of light or a black curtain/veil presents over vision.    S/p R LTS and RUL excisional biopsy (11/2023)  The incision(s) is and are healing well.  The lid is in excellent position.    I have recommended:  * Continue antibiotic ointment or bland lubricating ointment (eg vaseline or aquaphor) to the incision site BID.  * Massage along the incision BID.  * Warm soaks QID until all edema and ecchymoses resolves   * Follow up with Dr. Bellamy as scheduled on 12/21/23 for cataract evaluation and repeat dilated exam.    *Follow up with me in 3 months        Vanesa Henley MD  Oculoplastic Surgery Fellow    Attending Physician Attestation:  I have seen and examined this patient with the fellow .  I have confirmed and edited as necessary the chief complaint(s), history of present illness, review of systems, relevant history, and examination findings as documented by others.  I have personally reviewed the relevant tests, images, and reports as documented above.  I have confirmed and edited as necessary the assessment and plan and agree with this note.    - Keven Gomez MD 11:09 AM 12/4/2023

## 2023-12-21 ENCOUNTER — OFFICE VISIT (OUTPATIENT)
Dept: OPHTHALMOLOGY | Facility: CLINIC | Age: 78
End: 2023-12-21
Attending: OPHTHALMOLOGY
Payer: COMMERCIAL

## 2023-12-21 DIAGNOSIS — C43.112 MALIGNANT MELANOMA OF RIGHT LOWER EYELID INCLUDING CANTHUS (H): ICD-10-CM

## 2023-12-21 DIAGNOSIS — N18.30 STAGE 3 CHRONIC KIDNEY DISEASE, UNSPECIFIED WHETHER STAGE 3A OR 3B CKD (H): ICD-10-CM

## 2023-12-21 DIAGNOSIS — E66.01 OBESITY, MORBID, BMI 40.0-49.9 (H): ICD-10-CM

## 2023-12-21 DIAGNOSIS — H25.813 COMBINED FORMS OF AGE-RELATED CATARACT, BILATERAL: Primary | ICD-10-CM

## 2023-12-21 PROCEDURE — 99213 OFFICE O/P EST LOW 20 MIN: CPT | Performed by: OPHTHALMOLOGY

## 2023-12-21 PROCEDURE — 92025 CPTRIZED CORNEAL TOPOGRAPHY: CPT | Performed by: OPHTHALMOLOGY

## 2023-12-21 PROCEDURE — 99214 OFFICE O/P EST MOD 30 MIN: CPT | Mod: 24 | Performed by: OPHTHALMOLOGY

## 2023-12-21 PROCEDURE — G0463 HOSPITAL OUTPT CLINIC VISIT: HCPCS | Performed by: OPHTHALMOLOGY

## 2023-12-21 PROCEDURE — 76519 ECHO EXAM OF EYE: CPT | Performed by: OPHTHALMOLOGY

## 2023-12-21 ASSESSMENT — TONOMETRY
OS_IOP_MMHG: 20
IOP_METHOD: TONOPEN
OD_IOP_MMHG: 16

## 2023-12-21 ASSESSMENT — ENCOUNTER SYMPTOMS: JOINT SWELLING: 1

## 2023-12-21 ASSESSMENT — CONF VISUAL FIELD
OD_INFERIOR_NASAL_RESTRICTION: 0
OS_INFERIOR_TEMPORAL_RESTRICTION: 0
METHOD: COUNTING FINGERS
OS_NORMAL: 1
OS_INFERIOR_NASAL_RESTRICTION: 0
OS_SUPERIOR_NASAL_RESTRICTION: 0
OD_INFERIOR_TEMPORAL_RESTRICTION: 0
OS_SUPERIOR_TEMPORAL_RESTRICTION: 0
OD_NORMAL: 1
OD_SUPERIOR_NASAL_RESTRICTION: 0
OD_SUPERIOR_TEMPORAL_RESTRICTION: 0

## 2023-12-21 ASSESSMENT — REFRACTION_WEARINGRX
OS_SPHERE: -1.75
OD_CYLINDER: +2.50
OS_AXIS: 177
OD_ADD: +2.50
SPECS_TYPE: PAL
OS_CYLINDER: +2.00
OD_SPHERE: -1.75
OD_AXIS: 177
OS_ADD: +2.50

## 2023-12-21 ASSESSMENT — VISUAL ACUITY
OD_CC: 20/60
OS_CC: 20/40
METHOD: SNELLEN - LINEAR
CORRECTION_TYPE: GLASSES
OD_CC+: -1
OS_CC+: +1

## 2023-12-21 ASSESSMENT — EXTERNAL EXAM - LEFT EYE: OS_EXAM: NORMAL

## 2023-12-21 ASSESSMENT — EXTERNAL EXAM - RIGHT EYE: OD_EXAM: NORMAL

## 2023-12-21 NOTE — PROGRESS NOTES
Chief Complaint(s) and History of Present Illness(es)     Consult For            Laterality: both eyes    Course: rapidly worsening    Associated symptoms: glare, dryness, redness and burning.  Negative for   eye pain    Comments: Referral to the [facility] for consideration of cataract   surgery right eye from Suly Li MD          Comments    He states that his vision has taken a significant decrease in the past 3   weeks.  He is now unable to read from his phone.  He had 5 surgeries   recently on his right lower lid to remove a malignant melanoma and   reconstruction of the area.    Aubrie Enriquez, COT 8:42 AM  December 21, 2023          Review of systems for the eyes was negative other than the pertinent positives/negatives listed in the HPI.      Assessment & Plan      Arthur Garcia is a 78 year old male with the following diagnoses:   1. Combined forms of age-related cataract, bilateral    2. H/o malignant melanoma of right lower eyelid including canthus s/p repair w/Early flap ()    3. Obesity, morbid, BMI 40.0-49.9 (H)    4. Stage 3 chronic kidney disease, unspecified whether stage 3a or 3b CKD (H)         Referred for cataract evaluation and worsening vision right eye > left eye   H/O of glaucoma laser at [facility] about 10 years ago  S/P resection of malignant melanoma of RLL including canthus s/p repair w/ Early flap     Cataract, both eyes  Visually significant  Risks, benefits and alternatives to cataract extraction/IOL implantation discussed; consent obtained.  Will schedule surgery today    Special equipment/needs:    Anesthesia:Topical  Dilation:Good  Iris expansion:Not needed  Pseudoexfoliation: No pseudoexfoliation  Trypan Blue: Yes  Right eye first  Dex  Will need surgery at [facility] due to habitus         Attending Physician Attestation:  Complete documentation of historical and exam elements from today's encounter can be found in the full encounter summary report (not reduplicated in this  progress note).  I personally obtained the chief complaint(s) and history of present illness.  I confirmed and edited as necessary the review of systems, past medical/surgical history, family history, social history, and examination findings as documented by others; and I examined the patient myself.  I personally reviewed the relevant tests, images, and reports as documented above.  I formulated and edited as necessary the assessment and plan and discussed the findings and management plan with the patient and family. Today with Arthur Garcia , I reviewed the indications, risks, benefits, and alternatives of the proposed surgical procedure including, but not limited to, failure obtain the desired result  and need for additional surgery, bleeding, infection, loss of vision, loss of the eye, and the remote possibility of permanent damage to any organ system or death with the use of anesthesia.  I provided multiple opportunities for the questions, answered all questions to the best of my ability, and confirmed that my answers and my discussion were understood.    . - Chico Bellamy MD

## 2023-12-21 NOTE — NURSING NOTE
Chief Complaints and History of Present Illnesses   Patient presents with    Consult For     Referral to the [facility] for consideration of cataract surgery right eye from Suly Li MD     Chief Complaint(s) and History of Present Illness(es)       Consult For              Laterality: both eyes    Course: rapidly worsening    Associated symptoms: glare, dryness, redness and burning.  Negative for eye pain    Comments: Referral to the [facility] for consideration of cataract surgery right eye from Suly Li MD              Comments    He states that his vision has taken a significant decrease in the past 3 weeks.  He is now unable to read from his phone.  He had 5 surgeries recently on his right lower lid to remove a malignant melanoma and reconstruction of the area.    Aubrie Enriquez, COT 8:42 AM  December 21, 2023

## 2023-12-22 ENCOUNTER — TELEPHONE (OUTPATIENT)
Dept: OPHTHALMOLOGY | Facility: CLINIC | Age: 78
End: 2023-12-22

## 2023-12-22 NOTE — TELEPHONE ENCOUNTER
Called patient to schedule surgery with Dr. Bellamy    Date of Surgery: 1/12,1/26    Location of surgery: [facility]    Pre-Op H&P: [facility]    Pre/Post Imaging:  No    Post-Op Appt Date: 1/18,2/1,2/29    Surgery Packet Mailed: yes    Additional comments: Spoke with patient, he is aware of above dates, will review packet and reach out with any questions           Anna C. Schoenecker on 12/22/2023 at 11:28 AM

## 2023-12-26 NOTE — TELEPHONE ENCOUNTER
Patient called looking for assistance on getting pre-op completed for eye surgery with Dr. Bellamy. Encounter routed to nilsa Francois on 12/26/2023 at 10:25 AM

## 2023-12-27 NOTE — TELEPHONE ENCOUNTER
Faxed patient appointment notes, pre op paper work [facility]. Patient to schedule pre op physical     Anna C. Schoenecker on 12/27/2023 at 7:49 AM

## 2024-01-09 ENCOUNTER — TRANSFERRED RECORDS (OUTPATIENT)
Dept: HEALTH INFORMATION MANAGEMENT | Facility: CLINIC | Age: 79
End: 2024-01-09
Payer: COMMERCIAL

## 2024-01-09 LAB
ALT SERPL-CCNC: 33 U/L (ref 0–55)
AST SERPL-CCNC: 17 U/L (ref 5–40)
CREATININE (EXTERNAL): 1.6 MG/DL (ref 0.5–1.5)
GFR ESTIMATED (EXTERNAL): 43 ML/MIN/1.73M2
GLUCOSE (EXTERNAL): 153 MG/DL (ref 70–180)
POTASSIUM (EXTERNAL): 3.7 MMOL/L (ref 3.5–5)

## 2024-02-01 ENCOUNTER — OFFICE VISIT (OUTPATIENT)
Dept: OPHTHALMOLOGY | Facility: CLINIC | Age: 79
End: 2024-02-01
Attending: OPHTHALMOLOGY
Payer: COMMERCIAL

## 2024-02-01 DIAGNOSIS — Z96.1 PSEUDOPHAKIA, BOTH EYES: Primary | ICD-10-CM

## 2024-02-01 PROCEDURE — G0463 HOSPITAL OUTPT CLINIC VISIT: HCPCS | Performed by: OPHTHALMOLOGY

## 2024-02-01 PROCEDURE — 99024 POSTOP FOLLOW-UP VISIT: CPT | Performed by: OPHTHALMOLOGY

## 2024-02-01 PROCEDURE — 99213 OFFICE O/P EST LOW 20 MIN: CPT | Performed by: OPHTHALMOLOGY

## 2024-02-01 ASSESSMENT — VISUAL ACUITY
OD_PH_SC: 20/30
OS_SC+: -2
OS_PH_SC+: -1
OD_SC+: +1
OD_PH_SC+: -2
OS_PH_SC: 20/30
METHOD: SNELLEN - LINEAR
OS_SC: 20/70
OD_SC: 20/40

## 2024-02-01 ASSESSMENT — TONOMETRY
OD_IOP_MMHG: 15
IOP_METHOD: TONOPEN
OS_IOP_MMHG: 19

## 2024-02-01 ASSESSMENT — EXTERNAL EXAM - LEFT EYE: OS_EXAM: NORMAL

## 2024-02-01 ASSESSMENT — EXTERNAL EXAM - RIGHT EYE: OD_EXAM: NORMAL

## 2024-02-01 NOTE — NURSING NOTE
Chief Complaints and History of Present Illnesses   Patient presents with    Post Op (Ophthalmology) Left Eye     [facility]1/26/24 left eye        Chief Complaint(s) and History of Present Illness(es)       Post Op (Ophthalmology) Left Eye              Comments: [facility]1/26/24 left eye                 Comments    1 week PO left eye   States no problems or concerns  No eye pain     Olivia Bellamy COT 1:51 PM February 1, 2024

## 2024-02-01 NOTE — PROGRESS NOTES
Chief Complaint(s) and History of Present Illness(es)     Post Op (Ophthalmology) Left Eye            Comments: [facility]1/26/24 left eye             Comments    1 week PO left eye   States no problems or concerns  No eye pain     Olivia Bellamy COT 1:51 PM February 1, 2024          Review of systems for the eyes was negative other than the pertinent positives/negatives listed in the HPI.      Assessment & Plan      Arthur Garcia is a 78 year old male with the following diagnoses:   1. Pseudophakia, both eyes         S/P cataract extraction right eye 1/12/24; left eye 1/26/24  Doing well  Ok to resume normal activities on Friday  Taper Predforte as directed  Artificial tears as needed         Patient disposition:   Return in about 2 weeks (around 2/15/2024) for Refraction, DFE, OCT Macula.           Attending Physician Attestation:  Complete documentation of historical and exam elements from today's encounter can be found in the full encounter summary report (not reduplicated in this progress note).  I personally obtained the chief complaint(s) and history of present illness.  I confirmed and edited as necessary the review of systems, past medical/surgical history, family history, social history, and examination findings as documented by others; and I examined the patient myself.  I personally reviewed the relevant tests, images, and reports as documented above.  I formulated and edited as necessary the assessment and plan and discussed the findings and management plan with the patient and family. . - Chico Bellamy MD

## 2024-03-04 NOTE — PROGRESS NOTES
Encouraged smoking cessation. Pt declines tx.    Chief Complaints and History of Present Illnesses   Patient presents with     Post Op (Ophthalmology) Right Eye     Pt here for POW#2 s/p right RECONSTRUCTION EYELID, MEDIAL AND LATERAL BIOPSY     Chief Complaint(s) and History of Present Illness(es)     Post Op (Ophthalmology) Right Eye    In right eye.  Associated symptoms include tearing. Additional comments:   Pt here for POW#2 s/p right RECONSTRUCTION EYELID, MEDIAL AND LATERAL   BIOPSY           Comments    Pt notes everything is going well. Eye is tearing. Denies pain. Pt does   have a headache currently, but does have hx of them.   Melissa Fry, COA on 4/24/2023 at 1:02 PM           Assessment & Plan     Arthur Garcia is a 78 year old male with the following diagnoses:   1. Malignant melanoma of conjunctiva, right (H)    2. Postoperative eye state       PATH = negative margins and negative SLN       Continue antibiotic ointment to the incision(s) two times a day.      Use warm soaks over the incision(s) four times a day until swelling and bruises resolve.    PLAN:  2nd stage reconstruction Right lower lid in 4-6 weeks              Attending Physician Attestation:  Complete documentation of historical and exam elements from today's encounter can be found in the full encounter summary report (not reduplicated in this progress note).  I personally obtained the chief complaint(s) and history of present illness.  I confirmed and edited as necessary the review of systems, past medical/surgical history, family history, social history, and examination findings as documented by others; and I examined the patient myself.  I personally reviewed the relevant tests, images, and reports as documented above.  I formulated and edited as necessary the assessment and plan and discussed the findings and management plan with the patient and family. I personally reviewed the ophthalmic test(s) associated with this encounter, agree with the interpretation(s) as documented ,  and have edited the corresponding report(s) as necessary.   -Keven Gomez MD  1:14 PM 4/24/2023    Today with Arthur Garcia, I reviewed the indications, risks, benefits, and alternatives of the proposed surgical procedure including, but not limited to, failure obtain the desired result  and need for additional surgery, bleeding, infection, loss of vision, loss of the eye, and the remote possibility of permanent damage to any organ system or death with the use of anesthesia.  I provided multiple opportunities for the questions, answered all questions to the best of my ability, and confirmed that my answers and my discussion were understood.     - Keven Gomez MD 1:20 PM 4/24/2023

## 2024-03-14 ENCOUNTER — TELEPHONE (OUTPATIENT)
Dept: OPHTHALMOLOGY | Facility: CLINIC | Age: 79
End: 2024-03-14
Payer: COMMERCIAL

## 2024-03-14 NOTE — TELEPHONE ENCOUNTER
Patient called to reschedule missed post op with Dr. Bellamy. Writer was able to get his scheduled for 4/9. Writer sent out visit guide to patient via mail.   Lizabeth Francois on 3/14/2024 at 8:31 AM

## 2024-04-08 ENCOUNTER — OFFICE VISIT (OUTPATIENT)
Dept: OPHTHALMOLOGY | Facility: CLINIC | Age: 79
End: 2024-04-08
Payer: COMMERCIAL

## 2024-04-08 ENCOUNTER — PATIENT OUTREACH (OUTPATIENT)
Dept: ONCOLOGY | Facility: CLINIC | Age: 79
End: 2024-04-08
Payer: COMMERCIAL

## 2024-04-08 DIAGNOSIS — H02.103 ECTROPION DUE TO LAXITY OF EYELID, RIGHT: ICD-10-CM

## 2024-04-08 DIAGNOSIS — Z98.890 POSTOPERATIVE EYE STATE: Primary | ICD-10-CM

## 2024-04-08 DIAGNOSIS — C69.01 MALIGNANT MELANOMA OF CONJUNCTIVA, RIGHT (H): ICD-10-CM

## 2024-04-08 DIAGNOSIS — L98.9 LESION OF SKIN OF CHEEK: ICD-10-CM

## 2024-04-08 PROCEDURE — 99213 OFFICE O/P EST LOW 20 MIN: CPT | Performed by: OPHTHALMOLOGY

## 2024-04-08 ASSESSMENT — TONOMETRY
OD_IOP_MMHG: 7
IOP_METHOD: ICARE
OS_IOP_MMHG: 11

## 2024-04-08 ASSESSMENT — EXTERNAL EXAM - RIGHT EYE: OD_EXAM: NORMAL

## 2024-04-08 ASSESSMENT — VISUAL ACUITY
METHOD: SNELLEN - LINEAR
OD_SC: 20/25
OD_SC+: -2
OS_PH_SC+: -2
OD_PH_SC+: +2
OS_SC: 20/40
OD_PH_SC: 20/25
OS_PH_SC: 20/25
OS_SC+: -1

## 2024-04-08 NOTE — PROGRESS NOTES
New Patient Oncology Nurse Navigator Note     Referring provider: Keven Gomez MD       Referring Clinic/Organization: [facility]      Referred to (specialty:) Medical Oncology     Requested provider (if applicable): NA     Date Referral Received: April 8, 2024     Evaluation for:  C69.01 (ICD-10-CM) - Malignant melanoma of conjunctiva, right (H     Right lower lid melanoma s/p resection needs follow up      Clinical History (per Nurse review of records provided):     Referral received from Dr. Gomez for a diagnosis of melanoma last seen yesterday. Patient was originally diagnosed in 12/2022.     Last pathology was from 11/22/23 which showed Seborrheic Keratosis of the right upper eyelid.     PET/CT scan was done on 1/2/23.      Records Location: See Bookmarked material     Records Needed: NA     Additional testing needed prior to consult: ? Message sent to consulting MD.    April 9, 2024    Called patient to introduced myself and role as nurse navigator with [facility] Hematology/Oncology department and to inform them that we have received the referral for a diagnosis of Melanoma from Dr. Gomez. Patient confirms they are aware of the referral and ready to schedule.     Patient did not answer the phone so a detailed message was left for them including NPS number. Requested callback to speak to a  to set the consult date/time/location. Explained to patient in the message that they will receive a call from our new patient scheduling team (NPS number below, hours are Monday - Friday 8am - 4:30 pm) in the next 1-2 business days to schedule the consultation. Encouraged them to call back with any questions.     Mikaela Burnett, RN, BSN  [facility] [place] Hematology/Oncology Nurse Navigator  [phone]

## 2024-04-08 NOTE — NURSING NOTE
Chief Complaints and History of Present Illnesses   Patient presents with    Post Op (Ophthalmology) Right Eye     Chief Complaint(s) and History of Present Illness(es)       Post Op (Ophthalmology) Right Eye              Laterality: right eye    Onset: 3 months ago    Associated symptoms: redness and foreign body sensation.  Negative for eye pain              Comments    Pom#3  right lower eyelid ectropion repair by tarsal strip procedure and right upper lid excision biopsy.  Notices redness eye and poking sensation.  Recently underwent cataract surgery in both eyes and will be seeing Dr. Bellamy tomorrow.  Is using artificial tears and notices improvement in symptoms when uses them.      Natasha Barcenas on 4/8/2024 at 10:36 AM

## 2024-04-08 NOTE — PROGRESS NOTES
Chief Complaints and History of Present Illnesses   Patient presents with    Post Op (Ophthalmology) Right Eye     Chief Complaint(s) and History of Present Illness(es)     Post Op (Ophthalmology) Right Eye    In right eye.  This started 3 months ago.  Associated symptoms include   redness and foreign body sensation.  Negative for eye pain.           Comments    Pom#3  right lower eyelid ectropion repair by tarsal strip procedure and   right upper lid excision biopsy.  Notices redness eye and poking   sensation.  Recently underwent cataract surgery in both eyes and will be   seeing Dr. Bellamy tomorrow.  Is using artificial tears and notices   improvement in symptoms when uses them.      Natasha Barcenas on 4/8/2024 at 10:36 AM             Assessment & Plan     Arthur Garcia is a 79 year old male with the following diagnoses:   1. Postoperative eye state    2. 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 Lesion of skin of cheek    4. Malignant melanoma of conjunctiva, right (H)       PLAN:  Art tears four times a day   Return to clinic 3-4 months  Referral to oncology for melanoma  Referral to dermatology for cheek lesion                Attending Physician Attestation:  Complete documentation of historical and exam elements from today's encounter can be found in the full encounter summary report (not reduplicated in this progress note).  I personally obtained the chief complaint(s) and history of present illness.  I confirmed and edited as necessary the review of systems, past medical/surgical history, family history, social history, and examination findings as documented by others; and I examined the patient myself.  I personally reviewed the relevant tests, images, and reports as documented above.  I formulated and edited as necessary the assessment and plan and discussed the findings and management plan with the patient and family. I personally reviewed the ophthalmic test(s) associated with this encounter,  agree with the interpretation(s) as documented by the resident/fellow, and have edited the corresponding report(s) as necessary.   -Keven Gomez MD  11:10 AM 4/8/2024

## 2024-04-09 ENCOUNTER — OFFICE VISIT (OUTPATIENT)
Dept: OPHTHALMOLOGY | Facility: CLINIC | Age: 79
End: 2024-04-09
Attending: OPHTHALMOLOGY
Payer: COMMERCIAL

## 2024-04-09 DIAGNOSIS — Z96.1 PSEUDOPHAKIA, BOTH EYES: Primary | ICD-10-CM

## 2024-04-09 DIAGNOSIS — C43.112 MALIGNANT MELANOMA OF RIGHT LOWER EYELID INCLUDING CANTHUS (H): ICD-10-CM

## 2024-04-09 PROCEDURE — G0463 HOSPITAL OUTPT CLINIC VISIT: HCPCS | Performed by: OPHTHALMOLOGY

## 2024-04-09 PROCEDURE — 99024 POSTOP FOLLOW-UP VISIT: CPT | Performed by: OPHTHALMOLOGY

## 2024-04-09 PROCEDURE — 99214 OFFICE O/P EST MOD 30 MIN: CPT | Performed by: OPHTHALMOLOGY

## 2024-04-09 PROCEDURE — 92015 DETERMINE REFRACTIVE STATE: CPT

## 2024-04-09 ASSESSMENT — VISUAL ACUITY
OS_SC: 20/70
OS_SC+: +1
OD_PH_SC: 20/25
OD_PH_SC+: -1
METHOD: SNELLEN - LINEAR
OS_PH_SC+: -2
OD_SC: 20/30
OD_SC+: -2
OS_PH_SC: 20/30

## 2024-04-09 ASSESSMENT — REFRACTION_MANIFEST
OS_ADD: +2.50
OS_AXIS: 171
OS_CYLINDER: +1.75
OS_SPHERE: -1.00
OD_ADD: +2.50
OD_CYLINDER: SPHERE
OD_SPHERE: -0.50

## 2024-04-09 ASSESSMENT — TONOMETRY
OS_IOP_MMHG: 18
IOP_METHOD: TONOPEN
OD_IOP_MMHG: 16

## 2024-04-09 ASSESSMENT — EXTERNAL EXAM - RIGHT EYE: OD_EXAM: NORMAL

## 2024-04-09 NOTE — NURSING NOTE
Chief Complaints and History of Present Illnesses   Patient presents with    Post Op (Ophthalmology) Both Eyes     Cataract extraction with IOL in the right eye on 01/12/2024   Cataract extraction with IOL in the left eye on 01/26/2024        Chief Complaint(s) and History of Present Illness(es)       Post Op (Ophthalmology) Both Eyes              Laterality: both eyes    Course: stable    Associated symptoms: dryness, eye pain and discharge (crusting in the mornings,right eye).  Negative for headache    Treatments tried: eye drops and artificial tears    Pain scale: 0/10    Comments: Cataract extraction with IOL in the right eye on 01/12/2024   Cataract extraction with IOL in the left eye on 01/26/2024                 Comments    He states that his right eye is bothered by burning and he wakes with some crusting in his lashes.  His distance vision is very good he tells me.  He is struggling to see at near.    He is using:  Pred forte daily in the right eye  Refresh drops    SRIRAM Edouard 10:36 AM  April 9, 2024

## 2024-04-09 NOTE — PROGRESS NOTES
Chief Complaint(s) and History of Present Illness(es)     Post Op (Ophthalmology) Both Eyes            Laterality: both eyes    Course: stable    Associated symptoms: dryness, eye pain, floaters and discharge (crusting   in the mornings,right eye).  Negative for headache    Treatments tried: eye drops and artificial tears    Pain scale: 0/10    Comments: Cataract extraction with IOL in the right eye on 01/12/2024   Cataract extraction with IOL in the left eye on 01/26/2024             Comments    He states that his right eye is bothered by burning and he wakes with some   crusting in his lashes.  His distance vision is very good he tells me.  He   is struggling to see at near.    He is using:  Pred forte daily in the right eye  Restasis  Refresh drops    SRIRAM Edouard 10:36 AM  April 9, 2024          Review of systems for the eyes was negative other than the pertinent positives/negatives listed in the HPI.      Assessment & Plan      Arthur Garcia is a 79 year old male with the following diagnoses:   1. Pseudophakia, both eyes    2. H/o malignant melanoma of right lower eyelid including canthus s/p repair w/Early flap (AH)           Doing well  Ok to resume normal activities  Taper Predforte as directed  Glasses prescription updated  Artificial tears as needed      Patient disposition:   Return in about 9 months (around 1/9/2025) for VT only, Refraction.           Attending Physician Attestation:  Complete documentation of historical and exam elements from today's encounter can be found in the full encounter summary report (not reduplicated in this progress note).  I personally obtained the chief complaint(s) and history of present illness.  I confirmed and edited as necessary the review of systems, past medical/surgical history, family history, social history, and examination findings as documented by others; and I examined the patient myself.  I personally reviewed the relevant tests, images, and reports as  documented above.  I formulated and edited as necessary the assessment and plan and discussed the findings and management plan with the patient and family. . - Chico Bellamy MD

## 2024-04-11 NOTE — PROGRESS NOTES
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  Patient presented with dryness, erythema, photophobia, and pain in the right eye not relieved by eye drops, artificial tears and ointment.  There was also a presumed conjunctival pyogenic granuloma in the right eye.  Patient underwent right eye conjunctival biopsy 12/05/2022, that revealed a ulcerated, nodular melanoma with a 4.1mm depth of invasion in the right lower eyelid that was positive for SOX10 and PRAME, but negative for AE1/AE3, CD45, ERG, and chromogranin.  Tumor extended to the lateral and deep margins.  There was no lymphovascular or perineural invasion.  There was no microsatellitosis. Tumor infiltrating lymphocytes were present and non-brisk without tumor regression. There were 13 mitoses/mm2.  MRI of brain and orbits 12/19/2022, was negative for intracranial metastases.  The orbits were unremarkable.  FDG PET/CT scan 01/02/2023, was negative for hypermetabolic lesions in the right lower eyelid.  There was no evidence of hypermetabolic lymphadenopathy or metastases.  There was a indeterminate 4 mm mildly enhancing focus in the right parotid gland with SUV max 4.  There was a 1.7 cm, non-FDG avid thyroid nodule. Patient underwent right conjunctiva wide excision and reconstruction with Amnioguard membrane, 02/13/2023, that revealed invasive conjunctival melanoma involving the inferior medial conjunctiva and tarsus.  Surgical margins were involved.  Patient then underwent wide local excision of the conjunctiva and right lower eyelid and right neck sentinel lymph node biopsy 04/05/2023, that revealed residual melanoma in situ adjacent to scar from the prior procedure, contacting the lateral margin.  Additional margin was negative for malignancy.  One right neck sentinel lymph node was  negative for metastasis (0/1 lymph node involved).  There were small capsular Melan-A/Sox10-positive cells, consistent with maryam nevus.  Patient underwent right lower eyelid full-thickness reconstruction with tarsoconjunctival flap and myocutaneous advancement flap 04/19/2023, and second stage right lower eyelid reconstruction with Early flap, 06/28/2023.  Patient also required right lower eyelid ectropion repair with lateral tarsal strip and excision of the right upper eyelid seborrheic keratosis, 11/22/2023.  Patient has recovered from surgery. Patient denies fever, anorexia, weight loss, headache, visual changes, cough, dyspnea, chest pain, abdominal pain, nausea, vomiting, constipation, diarrhea, bleeding, or bone pain.     PAST HISTORY:   -Hypertension.  -Hyperlipidemia.  -Obstructive sleep apnea.  -COPD.  -Stage 3 chronic kidney disease.  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  -History of melanoma of the right gluteus status post wide local excision, 1970s.  -Excisional biopsy for squamous cell skin cancer, 1969  -Glaucoma status post laser trabeculoplasty, right eye.  -Bilateral sensorineural hearing loss.  -Deviated nasal septum.  -Colonoscopy 02/2015, was negative for polyps.  -Nephrolithiasis  -Gout.  -Osteoarthritis involving the hands, hips, and knees.  -History of depression.  -Posttraumatic stress disorder.  -Cataract extraction, intraocular lens implant, right eye 01/12/2024; left eye 01/26/2024.  -Right medial and lateral right lower eyelid reconstruction, 04/19/2023, 06/20/2023, 11/22/2023.  -Ectropion repair, right lower eyelid, 11/22/2023.  -Nephrolithiasis status post cystoscopy, left ureteroscopy, Holmium laser lithotripsy, 03/21/2022.  -BPH status post TURP, 2005.  -Right ureteral stricture status post cystoscopy, right retrograde ureterogram, ureteroscopy, ureteral dilation, and stent placement, 04/23/2015.   -Appendectomy,  2000.  -Cholecystectomy, 1999.  -Tonsillectomy, 1951.      ALLERGIES:   Allergies   Allergen Reactions    Atorvastatin Muscle Pain (Myalgia)    Codeine Hives    Simvastatin Muscle Pain (Myalgia)     Other reaction(s): Muscle pain, Joint pain    Adhesive Tape Rash     Mepliex with border.    Medical Adhesive Remover Rash     Severely allergic to Coban - causing severe itching    Propoxyphene Rash and Swelling       MEDICATIONS:   Current Outpatient Medications   Medication Sig Dispense Refill    acetaminophen (TYLENOL) 325 MG tablet Take 2 tablets (650 mg) by mouth every 4 hours as needed for mild pain 50 tablet 0    allopurinol (ZYLOPRIM) 100 MG tablet Take 250 mg by mouth daily      aspirin (ASA) 81 MG chewable tablet Take 81 mg by mouth daily      atenolol (TENORMIN) 25 MG tablet Take 25 mg by mouth daily Once daily      buPROPion (WELLBUTRIN) 75 MG tablet       buPROPion (WELLBUTRIN) 75 MG tablet Once daily      celecoxib (CELEBREX) 100 MG capsule Take 100 mg by mouth 2 times daily      cholecalciferol 50 MCG (2000 UT) tablet TAKE ONE TABLET BY MOUTH DAILY FOR VITAMIN D SUPPLEMENT TAKE WITH EVENING MEAL.      cycloSPORINE (RESTASIS) 0.05 % ophthalmic emulsion Place 1 drop into both eyes 2 times daily      dexamethasone (DECADRON) 0.1 % ophthalmic suspension Apply 1 drop to eye      DULoxetine (CYMBALTA) 60 MG capsule TAKE ONE CAPSULE BY MOUTH EVERY MORNING FOR PAIN/DEPRESSION.      erythromycin (ROMYCIN) 5 MG/GM ophthalmic ointment Apply antibiotic ointment to all sutures three times a day, and 1/2 inch strip into the operated eye(s) at night. Use until follow up. 3.5 g 1    erythromycin (ROMYCIN) 5 MG/GM ophthalmic ointment Apply small amount to incision sites three times daily, then apply to inner lower lid of operative eye(s) at bedtime, as directed. 3.5 g 0    erythromycin (ROMYCIN) 5 MG/GM ophthalmic ointment Apply small amount to incision sites three times daily, then apply to inner lower lid of operative  eye(s) at bedtime, as directed. 3.5 g 0    erythromycin (ROMYCIN) 5 MG/GM ophthalmic ointment Apply 1 strip to eye      febuxostat (ULORIC) 80 MG TABS tablet TAKE ONE TABLET BY MOUTH EVERY MORNING FOR GOUT      fluticasone (FLONASE) 50 MCG/ACT nasal spray Spray 1 spray into both nostrils daily      loratadine (CLARITIN) 10 MG tablet Take 10 mg by mouth daily      melatonin 3 MG tablet Take 3-6 mg by mouth nightly as needed for sleep      methocarbamol (ROBAXIN) 750 MG tablet TAKE ONE TABLET BY MOUTH THREE TIMES A DAY AS NEEDED TO RELAX MUSCLES      neomycin-polymixin-dexAMETHasone (MAXITROL) 0.1 % ophthalmic suspension Please instill into the operative eye(s) four times daily until the bottle is finished. 5 mL 0    neomycin-polymixin-dexamethasone (MAXITROL) 0.1 % ophthalmic suspension Place 1 drop into the right eye 4 times daily 3 mL 0    polyethylene glycol-propylene glycol (SYSTANE ULTRA) 0.4-0.3 % SOLN ophthalmic solution INSTILL 1 DROP IN RIGHT EYE FOUR TIMES A DAY      pravastatin (PRAVACHOL) 40 MG tablet TAKE ONE TABLET BY MOUTH DAILY FOR CHOLESTEROL      prednisoLONE acetate (PRED FORTE) 1 % ophthalmic suspension Place 1 drop into the right eye 4 times daily      tamsulosin (FLOMAX) 0.4 MG capsule TAKE TWO CAPSULES BY MOUTH ONCE A DAY FOR PROSTATE      traZODone (DESYREL) 100 MG tablet Take 1 tablet by mouth At Bedtime      triamterene-hydrochlorothiazide (MAXZIDE) 75-50 MG per tablet Take 1 tablet by mouth daily Once daily      vitamin C (ASCORBIC ACID) 500 MG tablet Take 500 mg by mouth daily         FAMILY HISTORY: Father  of age 87 of a ruptured aortic aneurysm.  Mother  age age 94 of natural causes.  There is 1 brother Reza who  at age 78 of natural causes.  There is 1 sister Modesta age 76 but patient is unaware of her health history.  There is 1 son age 52 and 1 daughter age 49 who are both alive and well.    SOCIAL HISTORY: Patient was born and raised in [place].  Patient is   "and lives on his own in [place], [place].  He was self-employed as a .  He does not consume tobacco or alcohol.  Patient had a tour of duty in the [employer] from 1964 through 1970 and was stationed in [place] with duties as an .  Social History     Tobacco Use    Smoking status: Never    Smokeless tobacco: Never   Substance Use Topics    Alcohol use: No    Drug use: No       REVIEW OF SYSTEMS: Review of systems reviewed with the patient and otherwise negative except for those detailed above.    PHYSICAL EXAM: BP (!) 163/96 (BP Location: Right arm, Patient Position: Sitting, Cuff Size: Adult Large)   Pulse 65   Temp 98  F (36.7  C) (Oral)   Resp 16   Ht 1.738 m (5' 8.41\")   Wt (!) 150.4 kg (331 lb 9.6 oz)   SpO2 93%   BMI 49.82 kg/m  .  Body surface area is 2.69 meters squared..  Skin: No erythema or rash.  HEENT: Sclera nonicteric.  Conjunctiva normal.  Pupils equal round reactive.  Nose clear.  Tongue and uvula midline.  Oropharynx without lesions or ulceration, mucosa pink and moist.  Neck: Supple without masses.  Nodes: No cervical, supraclavicular, axillary, or inguinal adenopathy.  Lungs: No dullness to percussion.  No rales, wheezes, rhonchi.  Heart: Regular rate and rhythm.  Abdomen: Bowel sounds present.  Soft, nontender, no hepatosplenomegaly or mass.  Extremities: No edema.  Neurologic: Sensory, motor, cerebellar normal.     MAGING REVIEWED: FDG PET/CT scan 01/02/2023, was reviewed and negative for hypermetabolic lesions.     IMPRESSION/PLAN: Melanoma is an ulcerated, 4.1 mm depth of invasion nodular melanoma in the right lower eyelid without lymphovascular or perineural invasion involving the inferior medial conjunctiva and tarsus.  Patient underwent definitive wide local excision of the conjunctiva and right lower eyelid achieving negative surgical margins, and 1 right neck sentinel lymph node was negative for metastasis.  There is no proven benefit for systemic " adjuvant therapy, although there are case series for immune checkpoint inhibitor for locally advanced disease if enucleation is not feasible or desired, or for metastatic disease.  Patient will proceed with close surveillance including regular examination and ophthalmology clinic.  Surveillance consists of clinical evaluation every 3 months for year 1, every 6 months for years 2-3, and annually for years 4-5 and CT neck, chest, abdomen, pelvis every 6 months for years 1-2 and annually for 3 years 3-5. Patient will return to clinic in 4 weeks with CBC, metabolic panel, and CT neck, chest, abdomen, pelvis. The current and past history obtained from the patient and medical records, clinical evaluation, reviewing diagnostic tests and viewing images with the patient, and assessment and planning occurred over 60 minutes.       Dong Forte MD    cc: MARITZA Baugh CNP  Keven Gomez MD

## 2024-04-17 ENCOUNTER — TELEPHONE (OUTPATIENT)
Dept: DERMATOLOGY | Facility: CLINIC | Age: 79
End: 2024-04-17
Payer: COMMERCIAL

## 2024-04-17 ENCOUNTER — TRANSCRIBE ORDERS (OUTPATIENT)
Dept: OTHER | Age: 79
End: 2024-04-17

## 2024-04-17 DIAGNOSIS — C43.112 MALIGNANT MELANOMA OF RIGHT LOWER EYELID INCLUDING CANTHUS (H): Primary | ICD-10-CM

## 2024-04-17 NOTE — TELEPHONE ENCOUNTER
Spoke with patient and scheduled a sooner appointment. He is unable to check his calendar at this time and asked for a call back this afternoon. 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    Informed the patient that we will call his this afternoon to verify his appointment date and time    Cristobal BENDER CMA

## 2024-04-17 NOTE — TELEPHONE ENCOUNTER
Writer called patient and gave him his appointment details. He confirmed that this will work for him. He has been to the [facility] and verbalized that he is familiar with the location. I asked that he arrive around 1:15 pm for check-in.    Gina Moreno LPN

## 2024-04-17 NOTE — TELEPHONE ENCOUNTER
Order Questions    Question Answer   Service Type: General Dermatology   Reason for Referral: Skin Lesion   Is the lesion bleeding, changing, or growing? Yes   Scheduling Instructions: [facility] [place] will call you to coordinate your care as prescribed by your provider. If you don't hear from a representative within 2 business days, please call [phone].            Reference Links           Associated Diagnoses    Malignant melanoma of conjunctiva, right (H) [C69.01]

## 2024-04-17 NOTE — TELEPHONE ENCOUNTER
RECORDS STATUS - ALL OTHER DIAGNOSIS      RECORDS RECEIVED FROM: [facility]   DATE RECEIVED:    NOTES STATUS DETAILS   OFFICE NOTE from referring provider Epic 04/08/24: Dr. Keven Gomez   OFFICE NOTE from other specialist Epic 04/09/24: Dr. Chico Bellamy   DISCHARGE SUMMARY from hospital [facility] 11/22/23, 06/28/23, 04/19/23, 04/05/23, 02/13/23: [facility]   OPERATIVE REPORT [facility] 01/26/24: LEFT KELMAN PHACOEMULSIFICATION CLEAR CORNEAL INTRAOCULAR LENS IMPLANT     01/12/24: RIGHT KELMAN PHACOEMULSIFICATION CLEAR CORNEAL INTRAOCULAR LENS IMPLANT     11/22/23: Right lower eyelid ectropion repair by tarsal strip procedure     06/28/23: SECOND STAGE RECONSTRUCTION, RIGHT LOWER EYELID     (More surgeries in EPIC)   MEDICATION LIST [facility]    LABS     PATHOLOGY REPORTS Reports in Epic 11/22/23: [id number]  04/19/23: [id number]  04/05/23: [id number]  02/13/23: [id number]  12/05/22: [id number]   ANYTHING RELATED TO DIAGNOSIS Epic Most recent 01/09/24   IMAGING (NEED IMAGES & REPORT)     CT SCANS PACS 01/02/23: CT CAP   MRI PACS 12/19/22: MR Orbit  11/29/18: MRA Neck  11/29/18: MR Brain   XRAYS PACS [facility]:  11/29/15: XR Eye   PET PACS 01/02/23: PET Onc Eyes to Thighs

## 2024-04-18 ENCOUNTER — ONCOLOGY VISIT (OUTPATIENT)
Dept: ONCOLOGY | Facility: CLINIC | Age: 79
End: 2024-04-18
Attending: OPHTHALMOLOGY
Payer: COMMERCIAL

## 2024-04-18 ENCOUNTER — PRE VISIT (OUTPATIENT)
Dept: ONCOLOGY | Facility: CLINIC | Age: 79
End: 2024-04-18
Payer: COMMERCIAL

## 2024-04-18 VITALS
HEART RATE: 65 BPM | HEIGHT: 68 IN | RESPIRATION RATE: 16 BRPM | WEIGHT: 315 LBS | BODY MASS INDEX: 47.74 KG/M2 | OXYGEN SATURATION: 93 % | SYSTOLIC BLOOD PRESSURE: 163 MMHG | TEMPERATURE: 98 F | DIASTOLIC BLOOD PRESSURE: 96 MMHG

## 2024-04-18 DIAGNOSIS — C69.01 MALIGNANT MELANOMA OF CONJUNCTIVA, RIGHT (H): Primary | ICD-10-CM

## 2024-04-18 PROCEDURE — G0463 HOSPITAL OUTPT CLINIC VISIT: HCPCS | Performed by: INTERNAL MEDICINE

## 2024-04-18 PROCEDURE — 99205 OFFICE O/P NEW HI 60 MIN: CPT | Performed by: INTERNAL MEDICINE

## 2024-04-18 ASSESSMENT — PAIN SCALES - GENERAL: PAINLEVEL: MODERATE PAIN (5)

## 2024-04-18 NOTE — LETTER
4/18/2024         RE: Arthur Garcia  [address] [facility] [address]        Dear Colleague,    Thank you for referring your patient, Arthur Garcia, to the [facility]. Please see a copy of my visit note below.      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  Patient presented with dryness, erythema, photophobia, and pain in the right eye not relieved by eye drops, artificial tears and ointment.  There was also a presumed conjunctival pyogenic granuloma in the right eye.  Patient underwent right eye conjunctival biopsy 12/05/2022, that revealed a ulcerated, nodular melanoma with a 4.1mm depth of invasion in the right lower eyelid that was positive for SOX10 and PRAME, but negative for AE1/AE3, CD45, ERG, and chromogranin.  Tumor extended to the lateral and deep margins.  There was no lymphovascular or perineural invasion.  There was no microsatellitosis. Tumor infiltrating lymphocytes were present and non-brisk without tumor regression. There were 13 mitoses/mm2.  MRI of brain and orbits 12/19/2022, was negative for intracranial metastases.  The orbits were unremarkable.  FDG PET/CT scan 01/02/2023, was negative for hypermetabolic lesions in the right lower eyelid.  There was no evidence of hypermetabolic lymphadenopathy or metastases.  There was a indeterminate 4 mm mildly enhancing focus in the right parotid gland with SUV max 4.  There was a 1.7 cm, non-FDG avid thyroid nodule. Patient underwent right conjunctiva wide excision and reconstruction with Amnioguard membrane, 02/13/2023, that revealed invasive conjunctival melanoma involving the inferior medial conjunctiva and tarsus.  Surgical margins were involved.  Patient then underwent wide local excision of the conjunctiva and right lower eyelid and  right neck sentinel lymph node biopsy 04/05/2023, that revealed residual melanoma in situ adjacent to scar from the prior procedure, contacting the lateral margin.  Additional margin was negative for malignancy.  One right neck sentinel lymph node was negative for metastasis (0/1 lymph node involved).  There were small capsular Melan-A/Sox10-positive cells, consistent with maryam nevus.  Patient underwent right lower eyelid full-thickness reconstruction with tarsoconjunctival flap and myocutaneous advancement flap 04/19/2023, and second stage right lower eyelid reconstruction with Early flap, 06/28/2023.  Patient also required right lower eyelid ectropion repair with lateral tarsal strip and excision of the right upper eyelid seborrheic keratosis, 11/22/2023.  Patient has recovered from surgery. Patient denies fever, anorexia, weight loss, headache, visual changes, cough, dyspnea, chest pain, abdominal pain, nausea, vomiting, constipation, diarrhea, bleeding, or bone pain.     PAST HISTORY:   -Hypertension.  -Hyperlipidemia.  -Obstructive sleep apnea.  -COPD.  -Stage 3 chronic kidney disease.  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  -History of melanoma of the right gluteus status post wide local excision, 1970s.  -Excisional biopsy for squamous cell skin cancer, 1969  -Glaucoma status post laser trabeculoplasty, right eye.  -Bilateral sensorineural hearing loss.  -Deviated nasal septum.  -Colonoscopy 02/2015, was negative for polyps.  -Nephrolithiasis  -Gout.  -Osteoarthritis involving the hands, hips, and knees.  -History of depression.  -Posttraumatic stress disorder.  -Cataract extraction, intraocular lens implant, right eye 01/12/2024; left eye 01/26/2024.  -Right medial and lateral right lower eyelid reconstruction, 04/19/2023, 06/20/2023, 11/22/2023.  -Ectropion repair, right lower eyelid, 11/22/2023.  -Nephrolithiasis status post cystoscopy, left ureteroscopy,  Holmium laser lithotripsy, 03/21/2022.  -BPH status post TURP, 2005.  -Right ureteral stricture status post cystoscopy, right retrograde ureterogram, ureteroscopy, ureteral dilation, and stent placement, 04/23/2015.   -Appendectomy, 2000.  -Cholecystectomy, 1999.  -Tonsillectomy, 1951.      ALLERGIES:   Allergies   Allergen Reactions    Atorvastatin Muscle Pain (Myalgia)    Codeine Hives    Simvastatin Muscle Pain (Myalgia)     Other reaction(s): Muscle pain, Joint pain    Adhesive Tape Rash     Mepliex with border.    Medical Adhesive Remover Rash     Severely allergic to Coban - causing severe itching    Propoxyphene Rash and Swelling       MEDICATIONS:   Current Outpatient Medications   Medication Sig Dispense Refill    acetaminophen (TYLENOL) 325 MG tablet Take 2 tablets (650 mg) by mouth every 4 hours as needed for mild pain 50 tablet 0    allopurinol (ZYLOPRIM) 100 MG tablet Take 250 mg by mouth daily      aspirin (ASA) 81 MG chewable tablet Take 81 mg by mouth daily      atenolol (TENORMIN) 25 MG tablet Take 25 mg by mouth daily Once daily      buPROPion (WELLBUTRIN) 75 MG tablet       buPROPion (WELLBUTRIN) 75 MG tablet Once daily      celecoxib (CELEBREX) 100 MG capsule Take 100 mg by mouth 2 times daily      cholecalciferol 50 MCG (2000 UT) tablet TAKE ONE TABLET BY MOUTH DAILY FOR VITAMIN D SUPPLEMENT TAKE WITH EVENING MEAL.      cycloSPORINE (RESTASIS) 0.05 % ophthalmic emulsion Place 1 drop into both eyes 2 times daily      dexamethasone (DECADRON) 0.1 % ophthalmic suspension Apply 1 drop to eye      DULoxetine (CYMBALTA) 60 MG capsule TAKE ONE CAPSULE BY MOUTH EVERY MORNING FOR PAIN/DEPRESSION.      erythromycin (ROMYCIN) 5 MG/GM ophthalmic ointment Apply antibiotic ointment to all sutures three times a day, and 1/2 inch strip into the operated eye(s) at night. Use until follow up. 3.5 g 1    erythromycin (ROMYCIN) 5 MG/GM ophthalmic ointment Apply small amount to incision sites three times daily, then  apply to inner lower lid of operative eye(s) at bedtime, as directed. 3.5 g 0    erythromycin (ROMYCIN) 5 MG/GM ophthalmic ointment Apply small amount to incision sites three times daily, then apply to inner lower lid of operative eye(s) at bedtime, as directed. 3.5 g 0    erythromycin (ROMYCIN) 5 MG/GM ophthalmic ointment Apply 1 strip to eye      febuxostat (ULORIC) 80 MG TABS tablet TAKE ONE TABLET BY MOUTH EVERY MORNING FOR GOUT      fluticasone (FLONASE) 50 MCG/ACT nasal spray Spray 1 spray into both nostrils daily      loratadine (CLARITIN) 10 MG tablet Take 10 mg by mouth daily      melatonin 3 MG tablet Take 3-6 mg by mouth nightly as needed for sleep      methocarbamol (ROBAXIN) 750 MG tablet TAKE ONE TABLET BY MOUTH THREE TIMES A DAY AS NEEDED TO RELAX MUSCLES      neomycin-polymixin-dexAMETHasone (MAXITROL) 0.1 % ophthalmic suspension Please instill into the operative eye(s) four times daily until the bottle is finished. 5 mL 0    neomycin-polymixin-dexamethasone (MAXITROL) 0.1 % ophthalmic suspension Place 1 drop into the right eye 4 times daily 3 mL 0    polyethylene glycol-propylene glycol (SYSTANE ULTRA) 0.4-0.3 % SOLN ophthalmic solution INSTILL 1 DROP IN RIGHT EYE FOUR TIMES A DAY      pravastatin (PRAVACHOL) 40 MG tablet TAKE ONE TABLET BY MOUTH DAILY FOR CHOLESTEROL      prednisoLONE acetate (PRED FORTE) 1 % ophthalmic suspension Place 1 drop into the right eye 4 times daily      tamsulosin (FLOMAX) 0.4 MG capsule TAKE TWO CAPSULES BY MOUTH ONCE A DAY FOR PROSTATE      traZODone (DESYREL) 100 MG tablet Take 1 tablet by mouth At Bedtime      triamterene-hydrochlorothiazide (MAXZIDE) 75-50 MG per tablet Take 1 tablet by mouth daily Once daily      vitamin C (ASCORBIC ACID) 500 MG tablet Take 500 mg by mouth daily         FAMILY HISTORY: Father  of age 87 of a ruptured aortic aneurysm.  Mother  age age 94 of natural causes.  There is 1 brother Reza who  at age 78 of natural causes.   "There is 1 sister Modesta age 76 but patient is unaware of her health history.  There is 1 son age 52 and 1 daughter age 49 who are both alive and well.    SOCIAL HISTORY: Patient was born and raised in [place].  Patient is  and lives on his own in [place], [place].  He was self-employed as a .  He does not consume tobacco or alcohol.  Patient had a tour of duty in the [employer] from 1964 through 1970 and was stationed in [employer] with duties as an .  Social History     Tobacco Use    Smoking status: Never    Smokeless tobacco: Never   Substance Use Topics    Alcohol use: No    Drug use: No       REVIEW OF SYSTEMS: Review of systems reviewed with the patient and otherwise negative except for those detailed above.    PHYSICAL EXAM: BP (!) 163/96 (BP Location: Right arm, Patient Position: Sitting, Cuff Size: Adult Large)   Pulse 65   Temp 98  F (36.7  C) (Oral)   Resp 16   Ht 1.738 m (5' 8.41\")   Wt (!) 150.4 kg (331 lb 9.6 oz)   SpO2 93%   BMI 49.82 kg/m  .  Body surface area is 2.69 meters squared..  Skin: No erythema or rash.  HEENT: Sclera nonicteric.  Conjunctiva normal.  Pupils equal round reactive.  Nose clear.  Tongue and uvula midline.  Oropharynx without lesions or ulceration, mucosa pink and moist.  Neck: Supple without masses.  Nodes: No cervical, supraclavicular, axillary, or inguinal adenopathy.  Lungs: No dullness to percussion.  No rales, wheezes, rhonchi.  Heart: Regular rate and rhythm.  Abdomen: Bowel sounds present.  Soft, nontender, no hepatosplenomegaly or mass.  Extremities: No edema.  Neurologic: Sensory, motor, cerebellar normal.     MAGING REVIEWED: FDG PET/CT scan 01/02/2023, was reviewed and negative for hypermetabolic lesions.     IMPRESSION/PLAN: Melanoma is an ulcerated, 4.1 mm depth of invasion nodular melanoma in the right lower eyelid without lymphovascular or perineural invasion involving the inferior medial conjunctiva and tarsus.  " Patient underwent definitive wide local excision of the conjunctiva and right lower eyelid achieving negative surgical margins, and 1 right neck sentinel lymph node was negative for metastasis.  There is no proven benefit for systemic adjuvant therapy, although there are case series for immune checkpoint inhibitor for locally advanced disease if enucleation is not feasible or desired, or for metastatic disease.  Patient will proceed with close surveillance including regular examination and ophthalmology clinic.  Surveillance consists of clinical evaluation every 3 months for year 1, every 6 months for years 2-3, and annually for years 4-5 and CT neck, chest, abdomen, pelvis every 6 months for years 1-2 and annually for 3 years 3-5. Patient will return to clinic in 4 weeks with CBC, metabolic panel, and CT neck, chest, abdomen, pelvis. The current and past history obtained from the patient and medical records, clinical evaluation, reviewing diagnostic tests and viewing images with the patient, and assessment and planning occurred over 60 minutes.       Dong Forte MD    cc: MARITZA Baugh CNP  Keven Gomez MD

## 2024-04-18 NOTE — NURSING NOTE
"Oncology Rooming Note    April 18, 2024 7:18 AM   Arthur Garcia is a 79 year old male who presents for:    Chief Complaint   Patient presents with    Oncology Clinic Visit     Malignant melanoma of conjunctiva, right     Initial Vitals: BP (!) 163/96 (BP Location: Right arm, Patient Position: Sitting, Cuff Size: Adult Large)   Pulse 65   Temp 98  F (36.7  C) (Oral)   Resp 16   Ht 1.738 m (5' 8.41\")   Wt (!) 150.4 kg (331 lb 9.6 oz)   SpO2 93%   BMI 49.82 kg/m   Estimated body mass index is 49.82 kg/m  as calculated from the following:    Height as of this encounter: 1.738 m (5' 8.41\").    Weight as of this encounter: 150.4 kg (331 lb 9.6 oz). Body surface area is 2.69 meters squared.  Moderate Pain (5) Comment: Data Unavailable   No LMP for male patient.  Allergies reviewed: Yes  Medications reviewed: Yes    Medications: Medication refills not needed today.  Pharmacy name entered into Tray:    [facility] - [place], [place] - [address] [place] [address] PHARMACY [place], [place] - [phone]    Frailty Screening:   Is the patient here for a new oncology consult visit in cancer care? 2. No      Clinical concerns: none       Liya Pena"

## 2024-04-22 ENCOUNTER — OFFICE VISIT (OUTPATIENT)
Dept: DERMATOLOGY | Facility: CLINIC | Age: 79
End: 2024-04-22
Attending: OPHTHALMOLOGY
Payer: COMMERCIAL

## 2024-04-22 DIAGNOSIS — C69.01 MALIGNANT MELANOMA OF CONJUNCTIVA, RIGHT (H): ICD-10-CM

## 2024-04-22 DIAGNOSIS — Z85.820 HISTORY OF MALIGNANT MELANOMA OF SKIN: ICD-10-CM

## 2024-04-22 DIAGNOSIS — D18.01 CHERRY ANGIOMA: ICD-10-CM

## 2024-04-22 DIAGNOSIS — D22.9 MULTIPLE NEVI: ICD-10-CM

## 2024-04-22 DIAGNOSIS — Z12.83 ENCOUNTER FOR SCREENING FOR MALIGNANT NEOPLASM OF SKIN: Primary | ICD-10-CM

## 2024-04-22 DIAGNOSIS — L82.1 SEBORRHEIC KERATOSES: ICD-10-CM

## 2024-04-22 DIAGNOSIS — Z85.828 HISTORY OF NONMELANOMA SKIN CANCER: ICD-10-CM

## 2024-04-22 DIAGNOSIS — C43.112 MALIGNANT MELANOMA OF RIGHT LOWER EYELID INCLUDING CANTHUS (H): ICD-10-CM

## 2024-04-22 DIAGNOSIS — L81.4 LENTIGINES: ICD-10-CM

## 2024-04-22 DIAGNOSIS — D49.2 NEOPLASM OF UNSPECIFIED BEHAVIOR OF BONE, SOFT TISSUE, AND SKIN: ICD-10-CM

## 2024-04-22 PROCEDURE — 88305 TISSUE EXAM BY PATHOLOGIST: CPT | Mod: TC | Performed by: PHYSICIAN ASSISTANT

## 2024-04-22 PROCEDURE — 99203 OFFICE O/P NEW LOW 30 MIN: CPT | Mod: 25 | Performed by: PHYSICIAN ASSISTANT

## 2024-04-22 PROCEDURE — 11102 TANGNTL BX SKIN SINGLE LES: CPT | Performed by: PHYSICIAN ASSISTANT

## 2024-04-22 PROCEDURE — 88305 TISSUE EXAM BY PATHOLOGIST: CPT | Mod: 26 | Performed by: PATHOLOGY

## 2024-04-22 ASSESSMENT — PAIN SCALES - GENERAL: PAINLEVEL: NO PAIN (0)

## 2024-04-22 NOTE — NURSING NOTE
Dermatology Rooming Note    Arthur Garcia's goals for this visit include:   Chief Complaint   Patient presents with    Skin Check     Arthur is here today for a skin check      DEEPAK Hurd

## 2024-04-22 NOTE — LETTER
4/22/2024       RE: Arthur Garcia  [address] [facility] [address]     Dear Colleague,    Thank you for referring your patient, Arthur Garcia, to the [facility] [place] at [facility]. Please see a copy of my visit note below.    [facility] Dermatology Note  Encounter Date: Apr 22, 2024  Office Visit     Reviewed patients past medical history and pertinent chart review prior to patients visit today.     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 Shave biopsy today.    Procedure Note: Biopsy by shave technique  The risks and benefits of the procedure were described to the patient. These include but are not limited to bleeding, infection, scar, incomplete removal, and non-diagnostic biopsy. Verbal informed consent was obtained. The above site(s) was cleansed with an alcohol pad and injected with 1% lidocaine with epinephrine. Once anesthesia was obtained, a biopsy(ies) was performed with Gilette blade. The tissue(s) was placed in a labeled container(s) with formalin and sent to pathology. Hemostasis was achieved with aluminum chloride. Vaseline and a bandage were applied to the wound(s). The patient tolerated the procedure well and was given post biopsy care instructions.    # Tinea pedis  I recommended the patient initiate OTC Lamisil Ultra twice daily to the plantar and interdigital spaces until resolution. I recommenced keeping the feet as dry as possible and using an OTC antifungal powder if occlusive shoes are worn.    # Personal history of  malignant melanoma  # Multiple nevi, trunk and extremities  # Solar lentigines  - No signs of recurrence. Continued observation recommended.   - Nevi demonstrate no concerning features on dermoscopy. We discussed the importance of self exams at home.   - ABCDEs: Counseled ABCDEs of melanoma: Asymmetry, Border (irregularity), Color (not uniform, changes in color), Diameter (greater than 6 mm which is about the size of a pencil eraser), and Evolving (any changes in preexisting moles).  - Sun protection: Counseled SPF 30+ sunscreen, UPF clothing, sun avoidance, tanning bed avoidance.    # Cherry angiomas  # Seborrheic keratoses  - We discussed the benign nature of the skin lesions. No treatment required. Continued observation recommended. Follow up with any concerns.      Follow-up:  3 months for follow up full body skin exam, as needed for new or changing lesions or new concerns    All risks, benefits and alternatives were discussed with patient.  Patient is in agreement and understands the assessment and plan.  All questions were answered.  Lolis Szymanski PA-C  [facility] Dermat[facility]    ____________________________________________    CC: No chief complaint on file.    HPI:  Mr. Arthur Garcia is a(n) 79 year old male who presents today as a new patient for a full body skin cancer screening. The patient has a history of malignant melanoma. Today, the patient reports a rough lesion on the left cheek that has been present for months. This bleeds spontaneously. No other specific cutaneous concerns. The patient reports trying to be diligent with photoprotection.      Physical Exam:  Vitals: There were no vitals taken for this visit.  LYMPH: No cervical lymphadenopathy.   SKIN: Total skin excluding the genitalia areas was performed. The exam included the scalp, face, neck, bilateral arms, chest, back, abdomen, bilateral legs, digits, mons pubis, buttocks, and nails.   - Mora II.  - Involving the left cheek  is a 1.0 x 1.2 cm pink papule with adherent scale.   - Multiple tan/brown macules and papules scattered throughout exam, consistent with benign nevi. No concerning features on dermoscopy.   - Scattered tan, homogenous macules scattered on sun exposed skin, consistent with solar lentigines.   - Scattered waxy, stuck on appearing papules and patches, consistent with seborrheic keratoses.  - Several 1-2 mm red dome shaped symmetric papules, consistent with cherry angiomas.     - No other lesions of concern on areas examined.     Medications:  Current Outpatient Medications   Medication Sig Dispense Refill    acetaminophen (TYLENOL) 325 MG tablet Take 2 tablets (650 mg) by mouth every 4 hours as needed for mild pain 50 tablet 0    allopurinol (ZYLOPRIM) 100 MG tablet Take 250 mg by mouth daily      aspirin (ASA) 81 MG chewable tablet Take 81 mg by mouth daily      atenolol (TENORMIN) 25 MG tablet Take 25 mg by mouth daily Once daily      buPROPion (WELLBUTRIN) 75 MG tablet       buPROPion (WELLBUTRIN) 75 MG tablet Once daily      celecoxib (CELEBREX) 100 MG capsule Take 100 mg by mouth 2 times daily      cholecalciferol 50 MCG (2000 UT) tablet TAKE ONE TABLET BY MOUTH DAILY FOR VITAMIN D SUPPLEMENT TAKE WITH EVENING MEAL.      cycloSPORINE (RESTASIS) 0.05 % ophthalmic emulsion Place 1 drop into both eyes 2 times daily      dexamethasone (DECADRON) 0.1 % ophthalmic suspension Apply 1 drop to eye      DULoxetine (CYMBALTA) 60 MG capsule TAKE ONE CAPSULE BY MOUTH EVERY MORNING FOR PAIN/DEPRESSION.      erythromycin (ROMYCIN) 5 MG/GM ophthalmic ointment Apply antibiotic ointment to all sutures three times a day, and 1/2 inch strip into the operated eye(s) at night. Use until follow up. 3.5 g 1    erythromycin (ROMYCIN) 5 MG/GM ophthalmic ointment Apply small amount to incision sites three times daily, then apply to inner lower lid of operative eye(s) at bedtime, as directed. 3.5 g 0    erythromycin (ROMYCIN) 5 MG/GM  ophthalmic ointment Apply small amount to incision sites three times daily, then apply to inner lower lid of operative eye(s) at bedtime, as directed. 3.5 g 0    erythromycin (ROMYCIN) 5 MG/GM ophthalmic ointment Apply 1 strip to eye      febuxostat (ULORIC) 80 MG TABS tablet TAKE ONE TABLET BY MOUTH EVERY MORNING FOR GOUT      fluticasone (FLONASE) 50 MCG/ACT nasal spray Spray 1 spray into both nostrils daily      loratadine (CLARITIN) 10 MG tablet Take 10 mg by mouth daily      melatonin 3 MG tablet Take 3-6 mg by mouth nightly as needed for sleep      methocarbamol (ROBAXIN) 750 MG tablet TAKE ONE TABLET BY MOUTH THREE TIMES A DAY AS NEEDED TO RELAX MUSCLES      neomycin-polymixin-dexAMETHasone (MAXITROL) 0.1 % ophthalmic suspension Please instill into the operative eye(s) four times daily until the bottle is finished. 5 mL 0    neomycin-polymixin-dexamethasone (MAXITROL) 0.1 % ophthalmic suspension Place 1 drop into the right eye 4 times daily 3 mL 0    polyethylene glycol-propylene glycol (SYSTANE ULTRA) 0.4-0.3 % SOLN ophthalmic solution INSTILL 1 DROP IN RIGHT EYE FOUR TIMES A DAY      pravastatin (PRAVACHOL) 40 MG tablet TAKE ONE TABLET BY MOUTH DAILY FOR CHOLESTEROL      prednisoLONE acetate (PRED FORTE) 1 % ophthalmic suspension Place 1 drop into the right eye 4 times daily      tamsulosin (FLOMAX) 0.4 MG capsule TAKE TWO CAPSULES BY MOUTH ONCE A DAY FOR PROSTATE      traZODone (DESYREL) 100 MG tablet Take 1 tablet by mouth At Bedtime      triamterene-hydrochlorothiazide (MAXZIDE) 75-50 MG per tablet Take 1 tablet by mouth daily Once daily      vitamin C (ASCORBIC ACID) 500 MG tablet Take 500 mg by mouth daily       No current facility-administered medications for this visit.      Past Medical History:   Patient Active Problem List   Diagnosis    CARDIOVASCULAR SCREENING; LDL GOAL LESS THAN 130    Pulmonary emphysema, unspecified emphysema type (H)    Affective psychosis (H24)    Obesity, morbid, BMI  40.0-49.9 (H)    Stage 3 chronic kidney disease, unspecified whether stage 3a or 3b CKD (H)    Malignant melanoma of conjunctiva, right (H)     Past Medical History:   Diagnosis Date    Arthritis     Depression     Disorder of lipoprotein and lipid metabolism     Gout     Hypertension     Sleep apnea        CC Keven Gomez MD  [address] [place] [address] on close of this encounter.    Lidocaine-epinephrine 1-1:631120 % injection   1mL once for one use, starting 4/22/2024 ending 4/22/2024,  2mL disp, R-0, injection  Injected by DEEPAK Hurd

## 2024-04-22 NOTE — PROGRESS NOTES
Lidocaine-epinephrine 1-1:755014 % injection   1mL once for one use, starting 4/22/2024 ending 4/22/2024,  2mL disp, R-0, injection  Injected by DEEPAK Hurd

## 2024-04-22 NOTE — PROGRESS NOTES
[facility] Dermatology Note  Encounter Date: Apr 22, 2024  Office Visit     Reviewed patients past medical history and pertinent chart review prior to patients visit today.     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 Shave biopsy today.    Procedure Note: Biopsy by shave technique  The risks and benefits of the procedure were described to the patient. These include but are not limited to bleeding, infection, scar, incomplete removal, and non-diagnostic biopsy. Verbal informed consent was obtained. The above site(s) was cleansed with an alcohol pad and injected with 1% lidocaine with epinephrine. Once anesthesia was obtained, a biopsy(ies) was performed with Gilette blade. The tissue(s) was placed in a labeled container(s) with formalin and sent to pathology. Hemostasis was achieved with aluminum chloride. Vaseline and a bandage were applied to the wound(s). The patient tolerated the procedure well and was given post biopsy care instructions.    # Tinea pedis  I recommended the patient initiate OTC Lamisil Ultra twice daily to the plantar and interdigital spaces until resolution. I recommenced keeping the feet as dry as possible and using an OTC antifungal powder if occlusive shoes are worn.    # Personal history of malignant melanoma  # Multiple nevi, trunk and extremities  # Solar lentigines  - No signs of recurrence. Continued observation recommended.   - Nevi demonstrate no concerning features on dermoscopy. We discussed the importance of self exams at home.   - ABCDEs: Counseled ABCDEs of melanoma: Asymmetry, Border  (irregularity), Color (not uniform, changes in color), Diameter (greater than 6 mm which is about the size of a pencil eraser), and Evolving (any changes in preexisting moles).  - Sun protection: Counseled SPF 30+ sunscreen, UPF clothing, sun avoidance, tanning bed avoidance.    # Cherry angiomas  # Seborrheic keratoses  - We discussed the benign nature of the skin lesions. No treatment required. Continued observation recommended. Follow up with any concerns.      Follow-up:  3 months for follow up full body skin exam, as needed for new or changing lesions or new concerns    All risks, benefits and alternatives were discussed with patient.  Patient is in agreement and understands the assessment and plan.  All questions were answered.  Lolis Szymanski PA-C  [facility] Dermatology    ____________________________________________    CC: No chief complaint on file.    HPI:  Mr. Arthur Garcia is a(n) 79 year old male who presents today as a new patient for a full body skin cancer screening. The patient has a history of malignant melanoma. Today, the patient reports a rough lesion on the left cheek that has been present for months. This bleeds spontaneously. No other specific cutaneous concerns. The patient reports trying to be diligent with photoprotection.      Physical Exam:  Vitals: There were no vitals taken for this visit.  LYMPH: No cervical lymphadenopathy.   SKIN: Total skin excluding the genitalia areas was performed. The exam included the scalp, face, neck, bilateral arms, chest, back, abdomen, bilateral legs, digits, mons pubis, buttocks, and nails.   - Mora II.  - Involving the left cheek is a 1.0 x 1.2 cm pink papule with adherent scale.   - Multiple tan/brown macules and papules scattered throughout exam, consistent with benign nevi. No concerning features on dermoscopy.   - Scattered tan, homogenous macules scattered on sun exposed skin, consistent with solar lentigines.   - Scattered waxy,  stuck on appearing papules and patches, consistent with seborrheic keratoses.  - Several 1-2 mm red dome shaped symmetric papules, consistent with cherry angiomas.     - No other lesions of concern on areas examined.     Medications:  Current Outpatient Medications   Medication Sig Dispense Refill    acetaminophen (TYLENOL) 325 MG tablet Take 2 tablets (650 mg) by mouth every 4 hours as needed for mild pain 50 tablet 0    allopurinol (ZYLOPRIM) 100 MG tablet Take 250 mg by mouth daily      aspirin (ASA) 81 MG chewable tablet Take 81 mg by mouth daily      atenolol (TENORMIN) 25 MG tablet Take 25 mg by mouth daily Once daily      buPROPion (WELLBUTRIN) 75 MG tablet       buPROPion (WELLBUTRIN) 75 MG tablet Once daily      celecoxib (CELEBREX) 100 MG capsule Take 100 mg by mouth 2 times daily      cholecalciferol 50 MCG (2000 UT) tablet TAKE ONE TABLET BY MOUTH DAILY FOR VITAMIN D SUPPLEMENT TAKE WITH EVENING MEAL.      cycloSPORINE (RESTASIS) 0.05 % ophthalmic emulsion Place 1 drop into both eyes 2 times daily      dexamethasone (DECADRON) 0.1 % ophthalmic suspension Apply 1 drop to eye      DULoxetine (CYMBALTA) 60 MG capsule TAKE ONE CAPSULE BY MOUTH EVERY MORNING FOR PAIN/DEPRESSION.      erythromycin (ROMYCIN) 5 MG/GM ophthalmic ointment Apply antibiotic ointment to all sutures three times a day, and 1/2 inch strip into the operated eye(s) at night. Use until follow up. 3.5 g 1    erythromycin (ROMYCIN) 5 MG/GM ophthalmic ointment Apply small amount to incision sites three times daily, then apply to inner lower lid of operative eye(s) at bedtime, as directed. 3.5 g 0    erythromycin (ROMYCIN) 5 MG/GM ophthalmic ointment Apply small amount to incision sites three times daily, then apply to inner lower lid of operative eye(s) at bedtime, as directed. 3.5 g 0    erythromycin (ROMYCIN) 5 MG/GM ophthalmic ointment Apply 1 strip to eye      febuxostat (ULORIC) 80 MG TABS tablet TAKE ONE TABLET BY MOUTH EVERY MORNING  FOR GOUT      fluticasone (FLONASE) 50 MCG/ACT nasal spray Spray 1 spray into both nostrils daily      loratadine (CLARITIN) 10 MG tablet Take 10 mg by mouth daily      melatonin 3 MG tablet Take 3-6 mg by mouth nightly as needed for sleep      methocarbamol (ROBAXIN) 750 MG tablet TAKE ONE TABLET BY MOUTH THREE TIMES A DAY AS NEEDED TO RELAX MUSCLES      neomycin-polymixin-dexAMETHasone (MAXITROL) 0.1 % ophthalmic suspension Please instill into the operative eye(s) four times daily until the bottle is finished. 5 mL 0    neomycin-polymixin-dexamethasone (MAXITROL) 0.1 % ophthalmic suspension Place 1 drop into the right eye 4 times daily 3 mL 0    polyethylene glycol-propylene glycol (SYSTANE ULTRA) 0.4-0.3 % SOLN ophthalmic solution INSTILL 1 DROP IN RIGHT EYE FOUR TIMES A DAY      pravastatin (PRAVACHOL) 40 MG tablet TAKE ONE TABLET BY MOUTH DAILY FOR CHOLESTEROL      prednisoLONE acetate (PRED FORTE) 1 % ophthalmic suspension Place 1 drop into the right eye 4 times daily      tamsulosin (FLOMAX) 0.4 MG capsule TAKE TWO CAPSULES BY MOUTH ONCE A DAY FOR PROSTATE      traZODone (DESYREL) 100 MG tablet Take 1 tablet by mouth At Bedtime      triamterene-hydrochlorothiazide (MAXZIDE) 75-50 MG per tablet Take 1 tablet by mouth daily Once daily      vitamin C (ASCORBIC ACID) 500 MG tablet Take 500 mg by mouth daily       No current facility-administered medications for this visit.      Past Medical History:   Patient Active Problem List   Diagnosis    CARDIOVASCULAR SCREENING; LDL GOAL LESS THAN 130    Pulmonary emphysema, unspecified emphysema type (H)    Affective psychosis (H24)    Obesity, morbid, BMI 40.0-49.9 (H)    Stage 3 chronic kidney disease, unspecified whether stage 3a or 3b CKD (H)    Malignant melanoma of conjunctiva, right (H)     Past Medical History:   Diagnosis Date    Arthritis     Depression     Disorder of lipoprotein and lipid metabolism     Gout     Hypertension     Sleep apnea        HANH Baca  MD Jason  [address] [place] [address] on close of this encounter.

## 2024-04-22 NOTE — PATIENT INSTRUCTIONS
Patient Education        Proper skin care from [facility]:     -Eliminate harsh soaps as they strip the natural oils from the skin, often resulting in dry itchy skin ( i.e. Dial, Zest, Mongolian Spring)  -Use mild soaps such as Cetaphil or Dove Sensitive Skin in the shower. You do not need to use soap on arms, legs, and trunk every time you shower unless visibly soiled.   -Avoid hot or cold showers.  -After showering, lightly dry off and apply moisturizing within 2-3 minutes. This will help trap moisture in the skin.   -Aggressive use of a moisturizer at least 1-2 times a day to the entire body (including -Vanicream, Cetaphil, Aquaphor or Cerave) and moisturize hands after every washing.  -We recommend using moisturizers that come in a tub that needs to be scooped out, not a pump. This has more of an oil base. It will hold moisture in your skin much better than a water base moisturizer. The above recommended are non-pore clogging.        Wear a sunscreen with at least SPF 30 on your face, ears, neck and V of the chest daily. Wear sunscreen on other areas of the body if those areas are exposed to the sun throughout the day. Sunscreens can contain physical and/or chemical blockers. Physical blockers are less likely to clog pores, these include zinc oxide and titanium dioxide. Reapply every two hour and after swimming.      Sunscreen examples: https://www.ewg.org/sunscreen/     UV radiation  UVA radiation remains constant throughout the day and throughout the year. It is a longer wavelength than UVB and therefore penetrates deeper into the skin leading to immediate and delayed tanning, photoaging, and skin cancer. 70-80% of UVA and UVB radiation occurs between the hours of 10am-2pm.  UVB radiation  UVB radiation causes the most harmful effects and is more significant during the summer months. However, snow and ice can reflect UVB radiation leading to skin damage during the winter months as well. UVB radiation is  responsible for tanning, burning, inflammation, delayed erythema (pinkness), pigmentation (brown spots), and skin cancer.      I recommend self monthly full body exams and yearly full body exams with a dermatology provider. If you develop a new or changing lesion please follow up for examination. Most skin cancers are pink and scaly or pink and pearly. However, we do see blue/brown/black skin cancers.  Consider the ABCDEs of melanoma when giving yourself your monthly full body exam ( don't forget the groin, buttocks, feet, toes, etc). A-asymmetry, B-borders, C-color, D-diameter, E-elevation or evolving. If you see any of these changes please follow up in clinic. If you cannot see your back I recommend purchasing a hand held mirror to use with a larger wall mirror.       Checking for Skin Cancer  You can find cancer early by checking your skin each month. There are 3 kinds of skin cancer. They are melanoma, basal cell carcinoma, and squamous cell carcinoma. Doing monthly skin checks is the best way to find new marks or skin changes. Follow the instructions below for checking your skin.   The ABCDEs of checking moles for melanoma   Check your moles or growths for signs of melanoma using ABCDE:   Asymmetry: the sides of the mole or growth don t match  Border: the edges are ragged, notched, or blurred  Color: the color within the mole or growth varies  Diameter: the mole or growth is larger than 6 mm (size of a pencil eraser)  Evolving: the size, shape, or color of the mole or growth is changing (evolving is not shown in the images below)    Checking for other types of skin cancer  Basal cell carcinoma or squamous cell carcinoma have symptoms such as:      A spot or mole that looks different from all other marks on your skin  Changes in how an area feels, such as itching, tenderness, or pain  Changes in the skin's surface, such as oozing, bleeding, or scaliness  A sore that does not heal  New swelling or redness beyond  the border of a mole     Who s at risk?  Anyone can get skin cancer. But you are at greater risk if you have:   Fair skin, light-colored hair, or light-colored eyes  Many moles or abnormal moles on your skin  A history of sunburns from sunlight or tanning beds  A family history of skin cancer  A history of exposure to radiation or chemicals  A weakened immune system  If you have had skin cancer in the past, you are at risk for recurring skin cancer.   How to check your skin  Do your monthly skin checkups in front of a full-length mirror. Check all parts of your body, including your:   Head (ears, face, neck, and scalp)  Torso (front, back, and sides)  Arms (tops, undersides, upper, and lower armpits)  Hands (palms, backs, and fingers, including under the nails)  Buttocks and genitals  Legs (front, back, and sides)  Feet (tops, soles, toes, including under the nails, and between toes)  If you have a lot of moles, take digital photos of them each month. Make sure to take photos both up close and from a distance. These can help you see if any moles change over time.   Most skin changes are not cancer. But if you see any changes in your skin, call your doctor right away. Only he or she can diagnose a problem. If you have skin cancer, seeing your doctor can be the first step toward getting the treatment that could save your life.   [employer] last reviewed this educational content on 4/1/2019 2000-2020 [employer]. [address] [place] [address], [place] [address]. All rights reserved. This information is not intended as a substitute for professional medical care. Always follow your healthcare professional's instructions.        When should I call my doctor?  If you are worsening or not improving, please, contact us or seek urgent care as noted below.      Who should I call with questions (adults)?  [facility] (adult and pediatric): [phone]  [facility]  [place] (adult): [phone]  [facility] ([place], [place], [place] and [place]) [phone]  For urgent needs outside of business hours call the [facility] at [phone] and ask for the dermatology resident on call to be paged  If this is a medical emergency and you are unable to reach an ER, Call 911        If you need a prescription refill, please contact your pharmacy. Refills are approved or denied by our Physicians during normal business hours, Monday through Fridays  Per office policy, refills will not be granted if you have not been seen within the past year (or sooner depending on your child's condition)

## 2024-04-23 LAB
PATH REPORT.COMMENTS IMP SPEC: NORMAL
PATH REPORT.COMMENTS IMP SPEC: NORMAL
PATH REPORT.FINAL DX SPEC: NORMAL
PATH REPORT.GROSS SPEC: NORMAL
PATH REPORT.MICROSCOPIC SPEC OTHER STN: NORMAL
PATH REPORT.RELEVANT HX SPEC: NORMAL

## 2024-04-25 ENCOUNTER — TELEPHONE (OUTPATIENT)
Dept: DERMATOLOGY | Facility: CLINIC | Age: 79
End: 2024-04-25
Payer: COMMERCIAL

## 2024-04-25 DIAGNOSIS — C44.310 BCC (BASAL CELL CARCINOMA), FACE: Primary | ICD-10-CM

## 2024-04-25 NOTE — TELEPHONE ENCOUNTER
Result Care Coordination      Result Notes     Lolis Szymanski PA-C  4/25/2024  7:23 AM CDT Back to Top      BCC, needs Mohs. Please call patient to notify and place Mohs order. Thank you.

## 2024-04-25 NOTE — TELEPHONE ENCOUNTER
[facility] Call Center    Phone Message    May a detailed message be left on voicemail: yes     Reason for Call: Other: [facility] and said that we will need to fill out a request so the [facility] will [facility] the MOHS procedure. Please fax it to the [phone]. Thanks      Action Taken: Message routed to:  Adult Clinics: Dermatology p [id number]    Travel Screening: Not Applicable

## 2024-04-25 NOTE — TELEPHONE ENCOUNTER
Called patient to schedule surgery with Dr. Bright    Date of Surgery: 06/11    Surgery type: Mohs    Consult scheduled: Yes    Has patient had mohs with us before? No    Additional comments: michael Snyder on 4/25/2024 at 11:56 AM

## 2024-04-29 ENCOUNTER — TRANSCRIBE ORDERS (OUTPATIENT)
Dept: OTHER | Age: 79
End: 2024-04-29

## 2024-04-29 ENCOUNTER — TELEPHONE (OUTPATIENT)
Dept: OPHTHALMOLOGY | Facility: CLINIC | Age: 79
End: 2024-04-29
Payer: COMMERCIAL

## 2024-04-29 DIAGNOSIS — C43.112 MALIGNANT MELANOMA OF RIGHT LOWER EYELID INCLUDING CANTHUS (H): Primary | ICD-10-CM

## 2024-04-29 NOTE — TELEPHONE ENCOUNTER
[facility] Call Center    Phone Message    May a detailed message be left on voicemail: yes     Reason for Call: Form or Letter   Type or form/letter needing completion: Glasses RX with included Cataracts Procedure Date  Provider: Josesito Kim form needed: ASAP  Once completed: Fax form to: [facility] [phone]    Action Taken: Message routed to:  [facility] eye    Travel Screening: Not Applicable

## 2024-04-30 ENCOUNTER — TELEPHONE (OUTPATIENT)
Dept: DERMATOLOGY | Facility: CLINIC | Age: 79
End: 2024-04-30

## 2024-04-30 NOTE — TELEPHONE ENCOUNTER
"RE: Melanoma referral  Received: Today  Oralia Bradford MD Powell, Lori, CMA; Crystal Snyder; Earlene Wu, HUMBERTO; Susana Watters; Dong Bright MD  It look like they were already able to get negative margins on the melanoma based on the oncology note so I think it's just the BCC that needs surgery at this point. However, there is a new referral from the [facility] that says \"skin cancer/positive biopsy\" but I don't see much more information that than in the referral. Crystal, are you able to tell what the referral from yesterday was for?    Needs every 3 month full body skin checks with general dermatology.    Thanks!    Oralia          Previous Messages       ----- Message -----  From: Yaz Montez CMA  Sent: 4/30/2024   8:55 AM CDT  To: Crystal Snyder; Dong Bright MD; Susana Watters; *  Subject: Melanoma referral                                Dr. Bright, can you please review this patients chart/referral? Is there residual melanoma/additional surgery needed or is this for skin checks quarterly?  He is scheduled to treat the BCC on 6/11.    I want to make sure we get this patient scheduled appropriately.  Thank you,  Yaz Montez CMA  ----- Message -----  From: Crystal Snyder  Sent: 4/29/2024   1:34 PM CDT  To: Yaz Montez CMA; Susana Watters; *    Hey folks,    We just got another referral for this patient for Malignant melanoma of right lower eyelid including canthus. Do you want to make any changes to this patient's upcoming appts?    Crystal Snyder, Procedure  4/29/2024 1:33 PM  "

## 2024-04-30 NOTE — TELEPHONE ENCOUNTER
Per Dr. Bradford's recommendation, I called the referring phy's office to confirm if the referral is for a skin check or if there is a skin cancer still requiring treatment.     [facility] and [facility] and neither knew why another referral was sent.     Pt already had a FBSE with JUD Szymanski on 04/22 and has set up a 3 month return visit.     Pt is already scheduled for consultation and Mohs to address the recent BCC diagnosis.     The nurse could not conclude why another referral was sent on 04/29. I will close the referral as an error. [facility] will [facility] if they find an alternative explanation.     Crystal Snyder, Procedure  4/30/2024 11:56 AM

## 2024-05-06 ENCOUNTER — ANCILLARY PROCEDURE (OUTPATIENT)
Dept: CT IMAGING | Facility: CLINIC | Age: 79
End: 2024-05-06
Attending: INTERNAL MEDICINE
Payer: COMMERCIAL

## 2024-05-06 ENCOUNTER — LAB (OUTPATIENT)
Dept: LAB | Facility: CLINIC | Age: 79
End: 2024-05-06
Attending: INTERNAL MEDICINE
Payer: COMMERCIAL

## 2024-05-06 DIAGNOSIS — C69.01 MALIGNANT MELANOMA OF CONJUNCTIVA, RIGHT (H): ICD-10-CM

## 2024-05-06 LAB
ALBUMIN SERPL BCG-MCNC: 4.3 G/DL (ref 3.5–5.2)
ALP SERPL-CCNC: 74 U/L (ref 40–150)
ALT SERPL W P-5'-P-CCNC: 52 U/L (ref 0–70)
ANION GAP SERPL CALCULATED.3IONS-SCNC: 13 MMOL/L (ref 7–15)
AST SERPL W P-5'-P-CCNC: 31 U/L (ref 0–45)
BASOPHILS # BLD AUTO: 0 10E3/UL (ref 0–0.2)
BASOPHILS NFR BLD AUTO: 1 %
BILIRUB SERPL-MCNC: 0.6 MG/DL
BUN SERPL-MCNC: 21.6 MG/DL (ref 8–23)
CALCIUM SERPL-MCNC: 10 MG/DL (ref 8.8–10.2)
CHLORIDE SERPL-SCNC: 106 MMOL/L (ref 98–107)
CREAT BLD-MCNC: 2.1 MG/DL (ref 0.7–1.3)
CREAT SERPL-MCNC: 1.85 MG/DL (ref 0.67–1.17)
DEPRECATED HCO3 PLAS-SCNC: 22 MMOL/L (ref 22–29)
EGFRCR SERPLBLD CKD-EPI 2021: 32 ML/MIN/1.73M2
EGFRCR SERPLBLD CKD-EPI 2021: 37 ML/MIN/1.73M2
EOSINOPHIL # BLD AUTO: 0.1 10E3/UL (ref 0–0.7)
EOSINOPHIL NFR BLD AUTO: 1 %
ERYTHROCYTE [DISTWIDTH] IN BLOOD BY AUTOMATED COUNT: 14.2 % (ref 10–15)
GLUCOSE SERPL-MCNC: 173 MG/DL (ref 70–99)
HCT VFR BLD AUTO: 45.1 % (ref 40–53)
HGB BLD-MCNC: 15.9 G/DL (ref 13.3–17.7)
IMM GRANULOCYTES # BLD: 0 10E3/UL
IMM GRANULOCYTES NFR BLD: 0 %
LYMPHOCYTES # BLD AUTO: 2.3 10E3/UL (ref 0.8–5.3)
LYMPHOCYTES NFR BLD AUTO: 28 %
MCH RBC QN AUTO: 31.8 PG (ref 26.5–33)
MCHC RBC AUTO-ENTMCNC: 35.3 G/DL (ref 31.5–36.5)
MCV RBC AUTO: 90 FL (ref 78–100)
MONOCYTES # BLD AUTO: 0.7 10E3/UL (ref 0–1.3)
MONOCYTES NFR BLD AUTO: 9 %
NEUTROPHILS # BLD AUTO: 4.8 10E3/UL (ref 1.6–8.3)
NEUTROPHILS NFR BLD AUTO: 61 %
NRBC # BLD AUTO: 0 10E3/UL
NRBC BLD AUTO-RTO: 0 /100
PLATELET # BLD AUTO: 170 10E3/UL (ref 150–450)
POTASSIUM SERPL-SCNC: 3.6 MMOL/L (ref 3.4–5.3)
PROT SERPL-MCNC: 6.9 G/DL (ref 6.4–8.3)
RBC # BLD AUTO: 5 10E6/UL (ref 4.4–5.9)
SODIUM SERPL-SCNC: 141 MMOL/L (ref 135–145)
WBC # BLD AUTO: 7.9 10E3/UL (ref 4–11)

## 2024-05-06 PROCEDURE — 85025 COMPLETE CBC W/AUTO DIFF WBC: CPT

## 2024-05-06 PROCEDURE — 82565 ASSAY OF CREATININE: CPT

## 2024-05-06 PROCEDURE — 36415 COLL VENOUS BLD VENIPUNCTURE: CPT

## 2024-05-06 PROCEDURE — 71260 CT THORAX DX C+: CPT | Performed by: RADIOLOGY

## 2024-05-06 PROCEDURE — 70491 CT SOFT TISSUE NECK W/DYE: CPT | Mod: GC | Performed by: RADIOLOGY

## 2024-05-06 PROCEDURE — 80053 COMPREHEN METABOLIC PANEL: CPT

## 2024-05-06 PROCEDURE — 74177 CT ABD & PELVIS W/CONTRAST: CPT | Performed by: RADIOLOGY

## 2024-05-06 RX ORDER — IOPAMIDOL 755 MG/ML
122 INJECTION, SOLUTION INTRAVASCULAR ONCE
Status: COMPLETED | OUTPATIENT
Start: 2024-05-06 | End: 2024-05-06

## 2024-05-06 RX ADMIN — IOPAMIDOL 122 ML: 755 INJECTION, SOLUTION INTRAVASCULAR at 16:40

## 2024-05-16 ENCOUNTER — ONCOLOGY VISIT (OUTPATIENT)
Dept: ONCOLOGY | Facility: CLINIC | Age: 79
End: 2024-05-16
Attending: INTERNAL MEDICINE
Payer: COMMERCIAL

## 2024-05-16 ENCOUNTER — PATIENT OUTREACH (OUTPATIENT)
Dept: ONCOLOGY | Facility: CLINIC | Age: 79
End: 2024-05-16

## 2024-05-16 VITALS
RESPIRATION RATE: 16 BRPM | HEIGHT: 69 IN | WEIGHT: 315 LBS | SYSTOLIC BLOOD PRESSURE: 148 MMHG | TEMPERATURE: 97.9 F | HEART RATE: 65 BPM | DIASTOLIC BLOOD PRESSURE: 79 MMHG | OXYGEN SATURATION: 96 % | BODY MASS INDEX: 46.65 KG/M2

## 2024-05-16 DIAGNOSIS — C69.01 MALIGNANT MELANOMA OF CONJUNCTIVA, RIGHT (H): Primary | ICD-10-CM

## 2024-05-16 PROCEDURE — G0463 HOSPITAL OUTPT CLINIC VISIT: HCPCS | Performed by: INTERNAL MEDICINE

## 2024-05-16 PROCEDURE — 99214 OFFICE O/P EST MOD 30 MIN: CPT | Performed by: INTERNAL MEDICINE

## 2024-05-16 RX ORDER — BRIMONIDINE TARTRATE 1.5 MG/ML
1 SOLUTION/ DROPS OPHTHALMIC DAILY
COMMUNITY

## 2024-05-16 RX ORDER — ALBUTEROL SULFATE 90 UG/1
AEROSOL, METERED RESPIRATORY (INHALATION)
COMMUNITY

## 2024-05-16 RX ORDER — PREDNISONE 20 MG/1
40 TABLET ORAL
COMMUNITY
Start: 2023-12-27

## 2024-05-16 RX ORDER — MAG HYDROX/ALUMINUM HYD/SIMETH 400-400-40
SUSPENSION, ORAL (FINAL DOSE FORM) ORAL
COMMUNITY

## 2024-05-16 RX ORDER — KETOROLAC TROMETHAMINE 5 MG/ML
1 SOLUTION OPHTHALMIC
COMMUNITY
Start: 2024-01-12

## 2024-05-16 RX ORDER — FLUNISOLIDE 0.25 MG/ML
2 SOLUTION NASAL
COMMUNITY

## 2024-05-16 RX ORDER — MULTIVITAMIN,THERAPEUTIC
1 TABLET ORAL
COMMUNITY

## 2024-05-16 ASSESSMENT — PAIN SCALES - GENERAL: PAINLEVEL: EXTREME PAIN (8)

## 2024-05-16 NOTE — LETTER
5/16/2024         RE: Arthur Garcia  [address] [address] [place] [phone]        Dear Colleague,    Thank you for referring your patient, Arthur Garcia, to the [facility]. Please see a copy of my visit note below.      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  Patient presented with dryness, erythema, photophobia, and pain in the right eye not relieved by eye drops, artificial tears and ointment.  There was also a presumed conjunctival pyogenic granuloma in the right eye.  Patient underwent right eye conjunctival biopsy 12/05/2022, that revealed a ulcerated, nodular melanoma with a 4.1mm depth of invasion in the right lower eyelid that was positive for SOX10 and PRAME, but negative for AE1/AE3, CD45, ERG, and chromogranin.  Tumor extended to the lateral and deep margins.  There was no lymphovascular or perineural invasion.  There was no microsatellitosis. Tumor infiltrating lymphocytes were present and non-brisk without tumor regression. There were 13 mitoses/mm2.  MRI of brain and orbits 12/19/2022, was negative for intracranial metastases.  The orbits were unremarkable.  FDG PET/CT scan 01/02/2023, was negative for hypermetabolic lesions in the right lower eyelid.  There was no evidence of hypermetabolic lymphadenopathy or metastases.  There was a indeterminate 4 mm mildly enhancing focus in the right parotid gland with SUV max 4.  There was a 1.7 cm, non-FDG avid thyroid nodule. Patient underwent right conjunctiva wide excision and reconstruction with Amnioguard membrane, 02/13/2023, that revealed invasive conjunctival melanoma involving the inferior medial conjunctiva and tarsus.  Surgical margins were involved.  Subsequent wide local excision of the conjunctiva and right lower eyelid and right neck  sentinel lymph node biopsy 04/05/2023, revealed residual melanoma in situ adjacent to scar from the prior procedure, contacting the lateral margin.  Additional margins wer negative for malignancy.  One right neck sentinel lymph node was negative for metastasis (0/1 lymph node involved).  There were small capsular Melan-A/Sox10-positive cells, consistent with maryam nevus.  Patient underwent right lower eyelid full-thickness reconstruction with tarsoconjunctival flap and myocutaneous advancement flap 04/19/2023, and second stage right lower eyelid reconstruction with Early flap, 06/28/2023.  Patient also required right lower eyelid ectropion repair with lateral tarsal strip and excision of the right upper eyelid seborrheic keratosis, 11/22/2023.  Patient returns after the restaging evaluation.  Patient has arthritis discomfort in the hips and knees but otherwise feels well.  He denies fever, anorexia, weight loss, headache, visual changes, cough, dyspnea, chest pain, abdominal pain, nausea, vomiting, constipation, diarrhea, bleeding, or bone pain.     PAST HISTORY: Past history was reviewed and unchanged from the clinic note 04/18/2024.     MEDICATIONS:   Current Outpatient Medications   Medication Sig Dispense Refill    acetaminophen (TYLENOL) 325 MG tablet Take 2 tablets (650 mg) by mouth every 4 hours as needed for mild pain 50 tablet 0    allopurinol (ZYLOPRIM) 100 MG tablet Take 250 mg by mouth daily      aspirin (ASA) 81 MG chewable tablet Take 81 mg by mouth daily      atenolol (TENORMIN) 25 MG tablet Take 25 mg by mouth daily Once daily      buPROPion (WELLBUTRIN) 75 MG tablet       buPROPion (WELLBUTRIN) 75 MG tablet Once daily      celecoxib (CELEBREX) 100 MG capsule Take 100 mg by mouth 2 times daily      cholecalciferol 50 MCG (2000 UT) tablet TAKE ONE TABLET BY MOUTH DAILY FOR VITAMIN D SUPPLEMENT TAKE WITH EVENING MEAL.      cycloSPORINE (RESTASIS) 0.05 % ophthalmic emulsion Place 1 drop into both eyes  2 times daily      dexamethasone (DECADRON) 0.1 % ophthalmic suspension Apply 1 drop to eye      DULoxetine (CYMBALTA) 60 MG capsule TAKE ONE CAPSULE BY MOUTH EVERY MORNING FOR PAIN/DEPRESSION.      erythromycin (ROMYCIN) 5 MG/GM ophthalmic ointment Apply antibiotic ointment to all sutures three times a day, and 1/2 inch strip into the operated eye(s) at night. Use until follow up. 3.5 g 1    erythromycin (ROMYCIN) 5 MG/GM ophthalmic ointment Apply small amount to incision sites three times daily, then apply to inner lower lid of operative eye(s) at bedtime, as directed. 3.5 g 0    erythromycin (ROMYCIN) 5 MG/GM ophthalmic ointment Apply small amount to incision sites three times daily, then apply to inner lower lid of operative eye(s) at bedtime, as directed. 3.5 g 0    erythromycin (ROMYCIN) 5 MG/GM ophthalmic ointment Apply 1 strip to eye      febuxostat (ULORIC) 80 MG TABS tablet TAKE ONE TABLET BY MOUTH EVERY MORNING FOR GOUT      fluticasone (FLONASE) 50 MCG/ACT nasal spray Spray 1 spray into both nostrils daily      loratadine (CLARITIN) 10 MG tablet Take 10 mg by mouth daily      melatonin 3 MG tablet Take 3-6 mg by mouth nightly as needed for sleep      methocarbamol (ROBAXIN) 750 MG tablet TAKE ONE TABLET BY MOUTH THREE TIMES A DAY AS NEEDED TO RELAX MUSCLES      neomycin-polymixin-dexAMETHasone (MAXITROL) 0.1 % ophthalmic suspension Please instill into the operative eye(s) four times daily until the bottle is finished. 5 mL 0    neomycin-polymixin-dexamethasone (MAXITROL) 0.1 % ophthalmic suspension Place 1 drop into the right eye 4 times daily 3 mL 0    polyethylene glycol-propylene glycol (SYSTANE ULTRA) 0.4-0.3 % SOLN ophthalmic solution INSTILL 1 DROP IN RIGHT EYE FOUR TIMES A DAY      pravastatin (PRAVACHOL) 40 MG tablet TAKE ONE TABLET BY MOUTH DAILY FOR CHOLESTEROL      prednisoLONE acetate (PRED FORTE) 1 % ophthalmic suspension Place 1 drop into the right eye 4 times daily      tamsulosin (FLOMAX)  0.4 MG capsule TAKE TWO CAPSULES BY MOUTH ONCE A DAY FOR PROSTATE      traZODone (DESYREL) 100 MG tablet Take 1 tablet by mouth At Bedtime      triamterene-hydrochlorothiazide (MAXZIDE) 75-50 MG per tablet Take 1 tablet by mouth daily Once daily      vitamin C (ASCORBIC ACID) 500 MG tablet Take 500 mg by mouth daily         REVIEW OF SYSTEMS: Review of systems reviewed with the patient and otherwise negative except for those detailed above.    PHYSICAL EXAM: There were no vitals taken for this visit..  Physical exam was unchanged from prior clinic visit 04/18/2024.  Skin: No erythema or rash.  HEENT: Sclera nonicteric. Oropharynx without lesions or ulceration, mucosa pink and moist.  Nodes: No cervical, supraclavicular, axillary, or inguinal adenopathy.  Lungs: No dullness to percussion.  No rales, wheezes, rhonchi.  Heart: Regular rate and rhythm.  Abdomen: Bowel sounds present.  Soft, nontender, no hepatosplenomegaly or mass.  Extremities: No edema.      LABS REVIEWED: CBC is normal.  There is no change in the renal function (stage 3b chronic kidney disease).  Liver tests are normal.    Component      Latest Ref Rn 6/23/2023  12:17 PM 11/8/2023  8:42 AM 5/6/2024  4:03 PM   WBC      4.0 - 11.0 10e3/uL   7.9    RBC Count      4.40 - 5.90 10e6/uL   5.00    Hemoglobin      13.3 - 17.7 g/dL   15.9    Hematocrit      40.0 - 53.0 %   45.1    MCV      78 - 100 fL   90    MCH      26.5 - 33.0 pg   31.8    MCHC      31.5 - 36.5 g/dL   35.3    RDW      10.0 - 15.0 %   14.2    Platelet Count      150 - 450 10e3/uL   170    % Neutrophils      %   61    % Lymphocytes      %   28    % Monocytes      %   9    % Eosinophils      %   1    % Basophils      %   1    % Immature Granulocytes      %   0    NRBCs per 100 WBC      <1 /100   0    Absolute Neutrophils      1.6 - 8.3 10e3/uL   4.8    Absolute Lymphocytes      0.8 - 5.3 10e3/uL   2.3    Absolute Monocytes      0.0 - 1.3 10e3/uL   0.7    Absolute Eosinophils      0.0 - 0.7  10e3/uL   0.1    Absolute Basophils      0.0 - 0.2 10e3/uL   0.0    Absolute Immature Granulocytes      <=0.4 10e3/uL   0.0    Absolute NRBCs      10e3/uL   0.0    Sodium      135 - 145 mmol/L   141    Potassium      3.4 - 5.3 mmol/L   3.6    Carbon Dioxide (CO2)      22 - 29 mmol/L   22    Anion Gap      7 - 15 mmol/L   13    Urea Nitrogen      8.0 - 23.0 mg/dL   21.6    Creatinine      0.67 - 1.17 mg/dL   1.85 (H)    GFR Estimate      >60 mL/min/1.73m2   37 (L)    Calcium      8.8 - 10.2 mg/dL   10.0    Chloride      98 - 107 mmol/L   106    Glucose      70 - 99 mg/dL   173 (H)    Alkaline Phosphatase      40 - 150 U/L   74    AST      0 - 45 U/L   31    ALT      0 - 70 U/L   52    Protein Total      6.4 - 8.3 g/dL   6.9    Albumin      3.5 - 5.2 g/dL   4.3    Bilirubin Total      <=1.2 mg/dL   0.6    Creatinine (External)      0.5 - 1.5 mg/dL 1.8 (H) (E) 1.8 ! (E)    GFR Estimated (External)      >=60 ml/min 38 (L) (E)         IMAGING REVIEWED: CT neck, chest 05/06/2024, was negative for adenopathy or metastases.  There was a 25 mm mildly enhancing well-circumscribed left renal rounded lesion (previously 17 mm, PET/CT scan 01/02/2023) likely representing an enlarging proteinaceous or hemorrhagic cyst given lack of previous hypermetabolism.     Recent Results (from the past 744 hour(s))   CT Soft Tissue Neck w Contrast    Narrative    CT SOFT TISSUE NECK W CONTRAST 5/6/2024 4:51 PM    History:  Stage IIB conjuctival melanoma right eye. CT to evaluate for  metastases.; Malignant melanoma of conjunctiva, right (H)  ICD-10: Malignant melanoma of conjunctiva, right (H)  Additional history from EMR: 71-year-old male with conjunctival  melanoma and the right eye (pT4b, cN0, cM0). Melanoma is an ulcerated,  4.1 mm depth of invasion nodular melanoma in the right lower eyelid  without lymphovascular or perineural invasion involving the inferior  medial conjunctiva and tarsus.  Patient underwent definitive wide  local  excision of the conjunctiva and right lower eyelid achieving  negative surgical margins, and 1 right neck sentinel lymph node was  negative for metastasis.      Comparison:  PET/CT 1/2/2023 , MRI 12/19/2022    Technique: Following intravenous administration of nonionic iodinated  contrast medium, thin section helical CT images were obtained from the  skull base down to the level of the aortic arch.  Axial, coronal and  sagittal reformations were performed with 2-3 mm slice thickness  reconstruction. Images were reviewed in soft tissue, lung and bone  windows.    Contrast: 122 ml isovue 370    Findings:     Subcentimeter presumed intraparotid lymph nodes bilaterally; no  substantial change in size from prior PET/CT. Fat substitution of  parotid glands. Likely sentinel lymph node excision changes in the  right parotid tail.    Evaluation of the mucosal space demonstrates no evident abnormality in  the nasopharynx, oropharynx, hypopharynx or the glottis. The tongue  base appears normal.   No mass is identified in orbital cavities.    Heterogeneity of the thyroid gland with at least one or two hypodense  nodules in the left lobe; also present on prior PET/CT.    There is no evident cervical lymphadenopathy or enlarging lymph nodes  from prior PET/CT. The fascial spaces in the neck are intact  bilaterally. The major vascular structures in the neck appear  unremarkable.    Evaluation of the osseous structures demonstrate no worrisome lytic or  sclerotic lesion. Moderate to advanced multilevel cervical spondylosis  with varying degrees of neural foraminal stenosis and at least  moderate cervical spinal canal stenosis prominent at C5-C7. The  visualized paranasal sinuses are clear. The mastoid air cells are  clear.     The visualized lung apices are clear.      Impression    Impression:  No evident mass or adenopathy within the neck.     I have personally reviewed the examination and initial interpretation  and I agree with  the findings.    TIANA FAITH MD         SYSTEM ID:  [id number]       CT Chest/Abdomen/Pelvis w Contrast    Narrative    Chest abdomen pelvis CT with contrast    INDICATION: Malignant melanoma right eye stage IIB. Evaluate for  metastases.    CONTRAST: 122 mL intravenous Isovue-370    COMPARISON: PET/CT 1/2/2023. Patient also underwent neck CT today  which we discussed in a separate report.    FINDINGS    CHEST: Detail of the lungs shows calcified granulomas. Calcified  pleural plaques posteriorly at the lower chest which may be related to  prior asbestos exposure. No suspicious-appearing parenchymal pulmonary  nodules or groundglass opacities. Major airways appear nicely patent.  The included thyroid shows lateral aspect left lobe nodule unchanged  from previous. Pulmonary artery normal size. Aorta upper normal size.  Heart size normal. No pleural or pericardial effusion. No enlarged  lymph nodes in the chest.  Bone detail shows diffuse intrahepatic skeletal hyperostosis in the  mid to lower thoracic spine with mild cervical spondylosis as well.  Mild upper thoracic spondylosis. No suspicious sclerotic or  lytic/distracted lesion.    ABDOMEN/PELVIS: Liver markedly hypodense, suggestive of steatosis.  Cholecystectomy. Spleen normal. 11 mm right adrenal nodule unchanged.  Left adrenal hyperplasia unchanged. Pancreas is minimally atrophic.  Spleen normal. Multiple right renal cysts. Multiple left renal cysts.  There is a round circumscribed area of hyperdensity posteriorly and  medially at the left kidney measuring 25 mm in greatest dimension at  the upper pole, this previously measured 18 mm. Cannot exclude  metastatic disease as opposed to proteinaceous or hemorrhagic cyst  rather atypical renal cell lesion. Review of the previous PET/CT did  not show significant hypermetabolism.  No adenopathy.  No free fluid or free air. Urinary bladder unremarkable. No bowel  masses or other soft tissue masses. No inguinal or other  deep pelvic  nodes.  Bone detail shows diffuse osteopenia with L2 superior endplate  compression deformity an multilevel vacuum disc at L2-3 through L4-5.  These bony findings including the L2 superior endplate depression are  all unchanged.      Impression    IMPRESSION: Interval growth of mildly enhancing well-circumscribed  left renal rounded lesion from 17 mm 16 months ago to 25 mm currently.  Likely enlarging proteinaceous or hemorrhagic cyst given lack of  previous hypermetabolism. Close attention on follow-up recommended 2  exclude atypical melanoma metastasis.  Cholecystectomy.  Unchanged 11 mm right adrenal nodule likely benign. Multiple renal  cysts bilaterally.  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 No suspicious pulmonary finding.    ADELINE BRITO MD         SYSTEM ID:  [id number]       IMPRESSION/PLAN: Melanoma is an ulcerated, 4.1 mm depth of invasion nodular melanoma in the right lower eyelid without lymphovascular or perineural invasion involving the inferior medial conjunctiva and tarsus.  Patient underwent definitive wide local excision of the conjunctiva and right lower eyelid achieving negative surgical margins, and 1 right neck sentinel lymph node was negative for metastasis (04/05/2023).  There is no proven benefit for systemic adjuvant therapy, although there are case series for immune checkpoint inhibitor for locally advanced disease if enucleation is not feasible or desired, or for metastatic disease.  Patient will continue close surveillance including regular examination and ophthalmology clinic.  Surveillance consists of clinical evaluation every 3 months for year 1, every 6 months for years 2-3, and annually for years 4-5 and CT neck, chest, abdomen, pelvis every 6 months for years 1-2 and annually for 3 years 3-5. Patient will return to clinic in 6 months with CBC, metabolic panel, and CT neck, chest, abdomen, pelvis. The current and past  history, clinical evaluation, reviewing diagnostic tests and viewing images with the patient, and assessment and planning occurred over 30 minutes.       Dong Forte MD    cc: MARITZA Baugh CNP, MD

## 2024-05-16 NOTE — LETTER
RE: Arthur Garcia  [address] [address] [place] [phone]    My name is Bernadette and I am the RN Care Coordinator who works with Dr. Forte's patients.  I am here to help support your oncology care and provide communication/answer questions as needed between your appointments with Dr. Forte.    The best way to get a hold of me is through [employer].  I typically respond within the same day or by the following day.  You will simply select Dr. Forte when choosing to send a message and the message will be routed to me.     The main phone number for the [facility] is [phone] option 5, option 2.  That number will get you to our schedulers, our triage team, as well as me!  I am here to help you navigate through your oncology plan of care and answer any questions you might have along the way.  When you call that number, simply ask the person on the other line to speak with me and they will transfer you over.  I typically respond within a business day.  You are also more than welcome to contact me on [employer] for any non-urgent needs.  I typically respond within 1-2 business days.    That same number will also connect you to our scheduling team. If you have questions about your appointments or need to reschedule something, you can chat with one of our schedulers and they can assist you.     If for some reason you are having any new, acute, or worsening symptoms, that same number can connect you to our triage team.  Simply let the person on the other line know that you are a patient of Dr. Arias and need to speak with a triage nurse about the symptoms you are having. They are here to assist you during business hours and will be able to help you in real time.  For any urgent symptoms that occur after hours, weekends, and holidays, that number will get you to our on-call nurse and doctor who can assist you.  You can also ask to speak to the Triage Nurse if you need refills on your medications.    Please  reach out if you have any questions, comments, or concerns about your care.  I am looking forward to working with you!     Thank you and take care,    Bernadette Ellis, RN Care Coordinator  [facility] [address] [id number] [phone] Option 5, Option 2

## 2024-05-16 NOTE — PROGRESS NOTES
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  Patient presented with dryness, erythema, photophobia, and pain in the right eye not relieved by eye drops, artificial tears and ointment.  There was also a presumed conjunctival pyogenic granuloma in the right eye.  Patient underwent right eye conjunctival biopsy 12/05/2022, that revealed a ulcerated, nodular melanoma with a 4.1mm depth of invasion in the right lower eyelid that was positive for SOX10 and PRAME, but negative for AE1/AE3, CD45, ERG, and chromogranin.  Tumor extended to the lateral and deep margins.  There was no lymphovascular or perineural invasion.  There was no microsatellitosis. Tumor infiltrating lymphocytes were present and non-brisk without tumor regression. There were 13 mitoses/mm2.  MRI of brain and orbits 12/19/2022, was negative for intracranial metastases.  The orbits were unremarkable.  FDG PET/CT scan 01/02/2023, was negative for hypermetabolic lesions in the right lower eyelid.  There was no evidence of hypermetabolic lymphadenopathy or metastases.  There was a indeterminate 4 mm mildly enhancing focus in the right parotid gland with SUV max 4.  There was a 1.7 cm, non-FDG avid thyroid nodule. Patient underwent right conjunctiva wide excision and reconstruction with Amnioguard membrane, 02/13/2023, that revealed invasive conjunctival melanoma involving the inferior medial conjunctiva and tarsus.  Surgical margins were involved.  Subsequent wide local excision of the conjunctiva and right lower eyelid and right neck sentinel lymph node biopsy 04/05/2023, revealed residual melanoma in situ adjacent to scar from the prior procedure, contacting the lateral margin.  Additional margins wer negative for malignancy.  One right neck sentinel lymph node was negative for  metastasis (0/1 lymph node involved).  There were small capsular Melan-A/Sox10-positive cells, consistent with maryam nevus.  Patient underwent right lower eyelid full-thickness reconstruction with tarsoconjunctival flap and myocutaneous advancement flap 04/19/2023, and second stage right lower eyelid reconstruction with Early flap, 06/28/2023.  Patient also required right lower eyelid ectropion repair with lateral tarsal strip and excision of the right upper eyelid seborrheic keratosis, 11/22/2023.  Patient returns after the restaging evaluation.  Patient has arthritis discomfort in the hips and knees but otherwise feels well.  He denies fever, anorexia, weight loss, headache, visual changes, cough, dyspnea, chest pain, abdominal pain, nausea, vomiting, constipation, diarrhea, bleeding, or bone pain.     PAST HISTORY: Past history was reviewed and unchanged from the clinic note 04/18/2024.     MEDICATIONS:   Current Outpatient Medications   Medication Sig Dispense Refill    acetaminophen (TYLENOL) 325 MG tablet Take 2 tablets (650 mg) by mouth every 4 hours as needed for mild pain 50 tablet 0    allopurinol (ZYLOPRIM) 100 MG tablet Take 250 mg by mouth daily      aspirin (ASA) 81 MG chewable tablet Take 81 mg by mouth daily      atenolol (TENORMIN) 25 MG tablet Take 25 mg by mouth daily Once daily      buPROPion (WELLBUTRIN) 75 MG tablet       buPROPion (WELLBUTRIN) 75 MG tablet Once daily      celecoxib (CELEBREX) 100 MG capsule Take 100 mg by mouth 2 times daily      cholecalciferol 50 MCG (2000 UT) tablet TAKE ONE TABLET BY MOUTH DAILY FOR VITAMIN D SUPPLEMENT TAKE WITH EVENING MEAL.      cycloSPORINE (RESTASIS) 0.05 % ophthalmic emulsion Place 1 drop into both eyes 2 times daily      dexamethasone (DECADRON) 0.1 % ophthalmic suspension Apply 1 drop to eye      DULoxetine (CYMBALTA) 60 MG capsule TAKE ONE CAPSULE BY MOUTH EVERY MORNING FOR PAIN/DEPRESSION.      erythromycin (ROMYCIN) 5 MG/GM ophthalmic ointment  Apply antibiotic ointment to all sutures three times a day, and 1/2 inch strip into the operated eye(s) at night. Use until follow up. 3.5 g 1    erythromycin (ROMYCIN) 5 MG/GM ophthalmic ointment Apply small amount to incision sites three times daily, then apply to inner lower lid of operative eye(s) at bedtime, as directed. 3.5 g 0    erythromycin (ROMYCIN) 5 MG/GM ophthalmic ointment Apply small amount to incision sites three times daily, then apply to inner lower lid of operative eye(s) at bedtime, as directed. 3.5 g 0    erythromycin (ROMYCIN) 5 MG/GM ophthalmic ointment Apply 1 strip to eye      febuxostat (ULORIC) 80 MG TABS tablet TAKE ONE TABLET BY MOUTH EVERY MORNING FOR GOUT      fluticasone (FLONASE) 50 MCG/ACT nasal spray Spray 1 spray into both nostrils daily      loratadine (CLARITIN) 10 MG tablet Take 10 mg by mouth daily      melatonin 3 MG tablet Take 3-6 mg by mouth nightly as needed for sleep      methocarbamol (ROBAXIN) 750 MG tablet TAKE ONE TABLET BY MOUTH THREE TIMES A DAY AS NEEDED TO RELAX MUSCLES      neomycin-polymixin-dexAMETHasone (MAXITROL) 0.1 % ophthalmic suspension Please instill into the operative eye(s) four times daily until the bottle is finished. 5 mL 0    neomycin-polymixin-dexamethasone (MAXITROL) 0.1 % ophthalmic suspension Place 1 drop into the right eye 4 times daily 3 mL 0    polyethylene glycol-propylene glycol (SYSTANE ULTRA) 0.4-0.3 % SOLN ophthalmic solution INSTILL 1 DROP IN RIGHT EYE FOUR TIMES A DAY      pravastatin (PRAVACHOL) 40 MG tablet TAKE ONE TABLET BY MOUTH DAILY FOR CHOLESTEROL      prednisoLONE acetate (PRED FORTE) 1 % ophthalmic suspension Place 1 drop into the right eye 4 times daily      tamsulosin (FLOMAX) 0.4 MG capsule TAKE TWO CAPSULES BY MOUTH ONCE A DAY FOR PROSTATE      traZODone (DESYREL) 100 MG tablet Take 1 tablet by mouth At Bedtime      triamterene-hydrochlorothiazide (MAXZIDE) 75-50 MG per tablet Take 1 tablet by mouth daily Once daily       vitamin C (ASCORBIC ACID) 500 MG tablet Take 500 mg by mouth daily         REVIEW OF SYSTEMS: Review of systems reviewed with the patient and otherwise negative except for those detailed above.    PHYSICAL EXAM: There were no vitals taken for this visit..  Physical exam was unchanged from prior clinic visit 04/18/2024.  Skin: No erythema or rash.  HEENT: Sclera nonicteric. Oropharynx without lesions or ulceration, mucosa pink and moist.  Nodes: No cervical, supraclavicular, axillary, or inguinal adenopathy.  Lungs: No dullness to percussion.  No rales, wheezes, rhonchi.  Heart: Regular rate and rhythm.  Abdomen: Bowel sounds present.  Soft, nontender, no hepatosplenomegaly or mass.  Extremities: No edema.      LABS REVIEWED: CBC is normal.  There is no change in the renal function (stage 3b chronic kidney disease).  Liver tests are normal.    Component      Latest Ref Rng 6/23/2023  12:17 PM 11/8/2023  8:42 AM 5/6/2024  4:03 PM   WBC      4.0 - 11.0 10e3/uL   7.9    RBC Count      4.40 - 5.90 10e6/uL   5.00    Hemoglobin      13.3 - 17.7 g/dL   15.9    Hematocrit      40.0 - 53.0 %   45.1    MCV      78 - 100 fL   90    MCH      26.5 - 33.0 pg   31.8    MCHC      31.5 - 36.5 g/dL   35.3    RDW      10.0 - 15.0 %   14.2    Platelet Count      150 - 450 10e3/uL   170    % Neutrophils      %   61    % Lymphocytes      %   28    % Monocytes      %   9    % Eosinophils      %   1    % Basophils      %   1    % Immature Granulocytes      %   0    NRBCs per 100 WBC      <1 /100   0    Absolute Neutrophils      1.6 - 8.3 10e3/uL   4.8    Absolute Lymphocytes      0.8 - 5.3 10e3/uL   2.3    Absolute Monocytes      0.0 - 1.3 10e3/uL   0.7    Absolute Eosinophils      0.0 - 0.7 10e3/uL   0.1    Absolute Basophils      0.0 - 0.2 10e3/uL   0.0    Absolute Immature Granulocytes      <=0.4 10e3/uL   0.0    Absolute NRBCs      10e3/uL   0.0    Sodium      135 - 145 mmol/L   141    Potassium      3.4 - 5.3 mmol/L   3.6    Carbon  Dioxide (CO2)      22 - 29 mmol/L   22    Anion Gap      7 - 15 mmol/L   13    Urea Nitrogen      8.0 - 23.0 mg/dL   21.6    Creatinine      0.67 - 1.17 mg/dL   1.85 (H)    GFR Estimate      >60 mL/min/1.73m2   37 (L)    Calcium      8.8 - 10.2 mg/dL   10.0    Chloride      98 - 107 mmol/L   106    Glucose      70 - 99 mg/dL   173 (H)    Alkaline Phosphatase      40 - 150 U/L   74    AST      0 - 45 U/L   31    ALT      0 - 70 U/L   52    Protein Total      6.4 - 8.3 g/dL   6.9    Albumin      3.5 - 5.2 g/dL   4.3    Bilirubin Total      <=1.2 mg/dL   0.6    Creatinine (External)      0.5 - 1.5 mg/dL 1.8 (H) (E) 1.8 ! (E)    GFR Estimated (External)      >=60 ml/min 38 (L) (E)         IMAGING REVIEWED: CT neck, chest 05/06/2024, was negative for adenopathy or metastases.  There was a 25 mm mildly enhancing well-circumscribed left renal rounded lesion (previously 17 mm, PET/CT scan 01/02/2023) likely representing an enlarging proteinaceous or hemorrhagic cyst given lack of previous hypermetabolism.     Recent Results (from the past 744 hour(s))   CT Soft Tissue Neck w Contrast    Narrative    CT SOFT TISSUE NECK W CONTRAST 5/6/2024 4:51 PM    History:  Stage IIB conjuctival melanoma right eye. CT to evaluate for  metastases.; Malignant melanoma of conjunctiva, right (H)  ICD-10: Malignant melanoma of conjunctiva, right (H)  Additional history from EMR: 71-year-old male with conjunctival  melanoma and the right eye (pT4b, cN0, cM0). Melanoma is an ulcerated,  4.1 mm depth of invasion nodular melanoma in the right lower eyelid  without lymphovascular or perineural invasion involving the inferior  medial conjunctiva and tarsus.  Patient underwent definitive wide  local excision of the conjunctiva and right lower eyelid achieving  negative surgical margins, and 1 right neck sentinel lymph node was  negative for metastasis.      Comparison:  PET/CT 1/2/2023 , MRI 12/19/2022    Technique: Following intravenous  administration of nonionic iodinated  contrast medium, thin section helical CT images were obtained from the  skull base down to the level of the aortic arch.  Axial, coronal and  sagittal reformations were performed with 2-3 mm slice thickness  reconstruction. Images were reviewed in soft tissue, lung and bone  windows.    Contrast: 122 ml isovue 370    Findings:     Subcentimeter presumed intraparotid lymph nodes bilaterally; no  substantial change in size from prior PET/CT. Fat substitution of  parotid glands. Likely sentinel lymph node excision changes in the  right parotid tail.    Evaluation of the mucosal space demonstrates no evident abnormality in  the nasopharynx, oropharynx, hypopharynx or the glottis. The tongue  base appears normal.   No mass is identified in orbital cavities.    Heterogeneity of the thyroid gland with at least one or two hypodense  nodules in the left lobe; also present on prior PET/CT.    There is no evident cervical lymphadenopathy or enlarging lymph nodes  from prior PET/CT. The fascial spaces in the neck are intact  bilaterally. The major vascular structures in the neck appear  unremarkable.    Evaluation of the osseous structures demonstrate no worrisome lytic or  sclerotic lesion. Moderate to advanced multilevel cervical spondylosis  with varying degrees of neural foraminal stenosis and at least  moderate cervical spinal canal stenosis prominent at C5-C7. The  visualized paranasal sinuses are clear. The mastoid air cells are  clear.     The visualized lung apices are clear.      Impression    Impression:  No evident mass or adenopathy within the neck.     I have personally reviewed the examination and initial interpretation  and I agree with the findings.    TIANA FAITH MD         SYSTEM ID:  [id number]       CT Chest/Abdomen/Pelvis w Contrast    Narrative    Chest abdomen pelvis CT with contrast    INDICATION: Malignant melanoma right eye stage IIB. Evaluate  for  metastases.    CONTRAST: 122 mL intravenous Isovue-370    COMPARISON: PET/CT 1/2/2023. Patient also underwent neck CT today  which we discussed in a separate report.    FINDINGS    CHEST: Detail of the lungs shows calcified granulomas. Calcified  pleural plaques posteriorly at the lower chest which may be related to  prior asbestos exposure. No suspicious-appearing parenchymal pulmonary  nodules or groundglass opacities. Major airways appear nicely patent.  The included thyroid shows lateral aspect left lobe nodule unchanged  from previous. Pulmonary artery normal size. Aorta upper normal size.  Heart size normal. No pleural or pericardial effusion. No enlarged  lymph nodes in the chest.  Bone detail shows diffuse intrahepatic skeletal hyperostosis in the  mid to lower thoracic spine with mild cervical spondylosis as well.  Mild upper thoracic spondylosis. No suspicious sclerotic or  lytic/distracted lesion.    ABDOMEN/PELVIS: Liver markedly hypodense, suggestive of steatosis.  Cholecystectomy. Spleen normal. 11 mm right adrenal nodule unchanged.  Left adrenal hyperplasia unchanged. Pancreas is minimally atrophic.  Spleen normal. Multiple right renal cysts. Multiple left renal cysts.  There is a round circumscribed area of hyperdensity posteriorly and  medially at the left kidney measuring 25 mm in greatest dimension at  the upper pole, this previously measured 18 mm. Cannot exclude  metastatic disease as opposed to proteinaceous or hemorrhagic cyst  rather atypical renal cell lesion. Review of the previous PET/CT did  not show significant hypermetabolism.  No adenopathy.  No free fluid or free air. Urinary bladder unremarkable. No bowel  masses or other soft tissue masses. No inguinal or other deep pelvic  nodes.  Bone detail shows diffuse osteopenia with L2 superior endplate  compression deformity an multilevel vacuum disc at L2-3 through L4-5.  These bony findings including the L2 superior endplate  depression are  all unchanged.      Impression    IMPRESSION: Interval growth of mildly enhancing well-circumscribed  left renal rounded lesion from 17 mm 16 months ago to 25 mm currently.  Likely enlarging proteinaceous or hemorrhagic cyst given lack of  previous hypermetabolism. Close attention on follow-up recommended 2  exclude atypical melanoma metastasis.  Cholecystectomy.  Unchanged 11 mm right adrenal nodule likely benign. Multiple renal  cysts bilaterally.  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 No suspicious pulmonary finding.    ADELINE BRITO MD         SYSTEM ID:  [id number]       IMPRESSION/PLAN: Melanoma is an ulcerated, 4.1 mm depth of invasion nodular melanoma in the right lower eyelid without lymphovascular or perineural invasion involving the inferior medial conjunctiva and tarsus.  Patient underwent definitive wide local excision of the conjunctiva and right lower eyelid achieving negative surgical margins, and 1 right neck sentinel lymph node was negative for metastasis (04/05/2023).  There is no proven benefit for systemic adjuvant therapy, although there are case series for immune checkpoint inhibitor for locally advanced disease if enucleation is not feasible or desired, or for metastatic disease.  Patient will continue close surveillance including regular examination and ophthalmology clinic.  Surveillance consists of clinical evaluation every 3 months for year 1, every 6 months for years 2-3, and annually for years 4-5 and CT neck, chest, abdomen, pelvis every 6 months for years 1-2 and annually for 3 years 3-5. Patient will return to clinic in 6 months with CBC, metabolic panel, and CT neck, chest, abdomen, pelvis. The current and past history, clinical evaluation, reviewing diagnostic tests and viewing images with the patient, and assessment and planning occurred over 30 minutes.       Dong Forte MD    cc: MARITZA Baugh CNP  MD Jason

## 2024-05-16 NOTE — PROGRESS NOTES
[facility]: Cancer Care                                                                                          Mailed patient welcome letter with clinic contact info.    Bernadette Ellis RN  Cancer Care Coordinator  [facility]

## 2024-05-16 NOTE — NURSING NOTE
"Oncology Rooming Note    May 16, 2024 9:47 AM   Arthur Garcia is a 79 year old male who presents for:    Chief Complaint   Patient presents with    Oncology Clinic Visit     Melanoma     Initial Vitals: BP (!) 148/79 (BP Location: Right arm, Patient Position: Sitting, Cuff Size: Adult Large)   Pulse 65   Temp 97.9  F (36.6  C) (Oral)   Resp 16   Wt (!) 153.5 kg (338 lb 8 oz)   SpO2 96%   BMI 50.86 kg/m   Estimated body mass index is 50.86 kg/m  as calculated from the following:    Height as of 4/18/24: 1.738 m (5' 8.41\").    Weight as of this encounter: 153.5 kg (338 lb 8 oz). Body surface area is 2.72 meters squared.  Extreme Pain (8) Comment: Data Unavailable   No LMP for male patient.  Allergies reviewed: Yes  Medications reviewed: Yes    Medications: Medication refills not needed today.  Pharmacy name entered into [employer]:    [facility] - [place], [place] - [address] [place] [address] PHARMACY [place], [place] -  [facility] [phone]    Frailty Screening:   Is the patient here for a new oncology consult visit in cancer care? 2. No      Clinical concerns: none      Mckayla Lam, EMT  5/16/2024            "
no

## 2024-05-28 ENCOUNTER — VIRTUAL VISIT (OUTPATIENT)
Dept: DERMATOLOGY | Facility: CLINIC | Age: 79
End: 2024-05-28
Payer: COMMERCIAL

## 2024-05-28 DIAGNOSIS — C44.310 BCC (BASAL CELL CARCINOMA), FACE: Primary | ICD-10-CM

## 2024-05-28 PROCEDURE — 99214 OFFICE O/P EST MOD 30 MIN: CPT | Performed by: DERMATOLOGY

## 2024-05-28 NOTE — LETTER
5/28/2024         RE: Arthur Garcia  [id number] [facility] [id number]        Dear Colleague,    Thank you for referring your patient, Arthur Garcia, to the [facility]. Please see a copy of my visit note below.    [facility] Dermatology Note  Encounter Date: May 28, 2024  Store-and-Forward and Telephone. Location of teledermatologist: [facility].  Start time: 1. 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 NMSC  - snBCC, left cheek, s/p shave 4/22/24, MMS scheduled 6/11 @ 830a MG  - SCC, s/p excision, 1969     CC: Consult (BCC left cheek.)      Subjective: Arthur Garcia is a 79 year old male who presents today for Mohs micrographic surgery consultation for a recent diagnosis of skin cancer.  - Skin cancer(s): BCC  - Location(s): L Cheek  - Referred by Lolis Szymanski PA-C.   - no other concerns today    Objective:   Skin: Focused examination of the left cheek within the teledermatology photograph(s) on 4/22/24 was performed    - 2 cm pearly tumor on L cheek.     Path report:   Case: [id number]   Collected:           04/22/2024     Final Diagnosis   A(1). Skin, left cheek, shave:  - Basal cell carcinoma, superficial and nodular types - (see description)     Assessment and Plan:     1. Plan for Mohs micrographic surgery for skin cancer(s) above:  *Review lab result(s): Dermpath report   - We discussed the nature of the diagnosis/condition above. We discussed the treatment options, including the risks benefits and expectations of these options. We recommend micrographic surgery as the most effective and most tissue sparing option for treatment, and the patient  agrees to proceed with this.  The patient is aware of the risks, benefits and expectations of this procedure. The patient will be scheduled for this procedure, if not already done so.  - We anticipate the following closure type: Sliding or lifting flap      The patient was discussed with and evaluated by attending physician, Dong Bright MD.    Staff Involved:   Scribe/Staff    Scribe Disclosure:   I, SAE JOHNSON, am serving as a scribe; to document services personally performed by Dong Bright MD -based on data collection and the provider's statements to me.     Attending Attestation  I attest that the Scribe recorded the interview and exam that I personally performed.  I have reviewed the note and edited it as necessary.    Dong Bright M.D.  Professor  Director of Dermatologic Surgery  Department of Dermatology  [facility]          Again, thank you for allowing me to participate in the care of your patient.        Sincerely,        Dong Bright MD

## 2024-05-28 NOTE — NURSING NOTE
Arthur Garcia's goals for this visit include:   Chief Complaint   Patient presents with    Consult     BCC left cheek.       He requests these members of his care team be copied on today's visit information:     PCP: Camille Ellis    Referring Provider:  No referring provider defined for this encounter.    There were no vitals taken for this visit.    Do you need any medication refills at today's visit?       Susana Watters EMT        Excision/Mohs previsit information                                                    Diagnosis: basal cell carcinoma  Site(s): L cheek    Is the surgical site below the waist?  NO  If yes, instruct the patient to purchase over the counter chlorhexidine surgical soap and wash all skin below the belly button twice before surgery    Allergies   Allergen Reactions    Atorvastatin Muscle Pain (Myalgia)    Codeine Hives    Simvastatin Muscle Pain (Myalgia)     Other reaction(s): Muscle pain, Joint pain    Adhesive Tape Rash     Mepliex with border.    Medical Adhesive Remover Rash     Severely allergic to Coban - causing severe itching    Propoxyphene Rash and Swelling       Review and update allergy and medication list    Do you take the following medications:  Coumadin, Eliquis, Pradaxa, Xarelto:  NO.  If on Coumadin, INR should be checked within 7 days of surgery.  Range should be 3.5 or less or within therapeutic range.    Do you currently or have you previously had any of the following conditions:  Hepatitis:  NO  HIV/AIDS:  NO  Prolonged bleeding or bleeding disorder:  NO  Pacemaker: NO  Defibrillator:  NO  History of artificial or heart valve replacement:  NO  Endocarditis (inflammation of the inner lining of the heart's chambers and valves):  NO  Have you ever had a prosthetic joint infection:  NO  Pregnant or Breastfeeding:  N/A  Mobility device (wheelchair, transfer difficulty): YES cane sometimes     Important Reminders:                                                       -Ok to take all of their medications as prescribed  -Patients can eat, no need to be fasting  -If face is being treated, please come with a make-up free face  -If scalp is being treated, please come with clean hair free from product  -Patient will not be able to get the site wet for 48 hrs  -No submerging wound in standing water (lake, pool, bathtub, hot tub) for 2 weeks  -No physical activity for 48 hrs (further restrictions will be discussed by MD at time of visit)    If any positives, send to RN for further review  Susana Watters

## 2024-05-28 NOTE — PROGRESS NOTES
[facility] Dermatology Note  Encounter Date: May 28, 2024  Store-and-Forward and Telephone. Location of teledermatologist: [facility].  Start time: 1. 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 NMSC  - snBCC, left cheek, s/p shave 4/22/24, MMS scheduled 6/11 @ 830a MG  - SCC, s/p excision, 1969     CC: Consult (BCC left cheek.)      Subjective: Arthur Garcia is a 79 year old male who presents today for Mohs micrographic surgery consultation for a recent diagnosis of skin cancer.  - Skin cancer(s): BCC  - Location(s): L Cheek  - Referred by Lolis Szymanski PA-C.   - no other concerns today    Objective:   Skin: Focused examination of the left cheek within the teledermatology photograph(s) on 4/22/24 was performed    - 2 cm pearly tumor on L cheek.     Path report:   Case: [id number]   Collected:           04/22/2024     Final Diagnosis   A(1). Skin, left cheek, shave:  - Basal cell carcinoma, superficial and nodular types - (see description)     Assessment and Plan:     1. Plan for Mohs micrographic surgery for skin cancer(s) above:  *Review lab result(s): Dermpath report   - We discussed the nature of the diagnosis/condition above. We discussed the treatment options, including the risks benefits and expectations of these options. We recommend micrographic surgery as the most effective and most tissue sparing option for treatment, and the patient agrees to proceed with this.  The patient is aware of the risks, benefits and expectations of this procedure. The patient will be scheduled for this procedure, if not already done so.  - We anticipate the following closure type: Sliding or lifting  flap      The patient was discussed with and evaluated by attending physician, Dong Bright MD.    Staff Involved:   Scribe/Staff    Scribe Disclosure:   I, SAE ELIZABETH, am serving as a scribe; to document services personally performed by Dong Bright MD -based on data collection and the provider's statements to me.     Attending Attestation  I attest that the Scribe recorded the interview and exam that I personally performed.  I have reviewed the note and edited it as necessary.    Dong Bright M.D.  Professor  Director of Dermatologic Surgery  Department of Dermatology  [facility]

## 2024-06-11 ENCOUNTER — OFFICE VISIT (OUTPATIENT)
Dept: DERMATOLOGY | Facility: CLINIC | Age: 79
End: 2024-06-11
Payer: COMMERCIAL

## 2024-06-11 VITALS — DIASTOLIC BLOOD PRESSURE: 98 MMHG | HEART RATE: 59 BPM | SYSTOLIC BLOOD PRESSURE: 168 MMHG

## 2024-06-11 DIAGNOSIS — C44.310 BCC (BASAL CELL CARCINOMA), FACE: ICD-10-CM

## 2024-06-11 PROCEDURE — 13132 CMPLX RPR F/C/C/M/N/AX/G/H/F: CPT | Performed by: DERMATOLOGY

## 2024-06-11 PROCEDURE — 17311 MOHS 1 STAGE H/N/HF/G: CPT | Performed by: DERMATOLOGY

## 2024-06-11 ASSESSMENT — PAIN SCALES - GENERAL: PAINLEVEL: SEVERE PAIN (6)

## 2024-06-11 NOTE — PATIENT INSTRUCTIONS
Caring for your skin after surgery    After your surgery, a pressure bandage will be placed over the area. This will prevent bleeding. Please follow these instructions over the next 1 to 2 weeks. Following this regimen will help to prevent complications as your wound heals.     For the first 48 hours after your surgery:    Leave the pressure dressing on and keep it dry. If it should come loose, you may re-tape it, but do not take it off.  Relax and take it easy. Do not do any vigorous exercise, heavy lifting or bending forward. This could cause the wound to bleed.  Post-operative pain is usually mild. You may alternate between 1000 mg of Tylenol (acetaminophen) and 400 mg of Ibuprofen every 4 hours.  Do not take more than 4,000 mg of acetaminophen in a 24-hour period or 3200 mg of Ibuprofen in a 24-hr period.  Avoid alcohol and vitamin E as these may increase your tendency to bleed.  You may put an ice pack around the bandaged area for 20 minutes at a time as needed. This may help reduce swelling, bruising, and pain. Make sure the ice pack is waterproof so that the pressure bandage doesn't get wet.  You may see a small amount of drainage or blood on your pressure bandage. This is normal. However:  If drainage or bleeding continues or saturates the bandage, you will need to apply firm pressure over the bandage with a clean washcloth for 15 minutes.  If bleeding continues after applying pressure for 15 minutes, apply an ice pack with gentle pressure to the bandaged area for another 15 minutes.  If bleeding still continues, call our office or go to the nearest emergency room.    48 Hours After Surgery:  Carefully remove the pressure bandage. If it seems sticky or too difficult to get off, you may need to soak it off in the shower.  Wash wound with a mild soap and water.  Use caution when washing the wound, be gentle and do not let the forceful shower stream hit the wound directly.  Pat dry.  Apply Vaseline (from a new  container or tube) over the suture line with a Q-tip.  Cover the site with a bandage.  Do this daily until the sutures have dissolved.      What to expect:    The first couple of days your wound may be tender and may bleed slightly when doing wound care.  There may be swelling and bruising around the wound, especially if it is near the eyes. For your comfort, you may apply ice or cold compresses to the area.  The area around your wound may be numb for several weeks or even months.  You may experience periodic sharp pain or mild itching around the wound as it heals.   The suture line will look dark pink at first and the edges of the wound will be reddened. This will lighten up each day.    Call Us If:    You have bleeding that will not stop after applying pressure and ice.  You have pain that is not controlled with Tylenol and Ibuprofen.  You have signs or symptoms of an infection such as fever over 100 degrees Fahrenheit, redness, warmth or foul-smelling drainage from the wound    [facility]: [phone]   [facility]: [phone]  For urgent needs outside of business hours call the [facility] at [phone] and ask to speak with the dermatology resident on call

## 2024-06-11 NOTE — NURSING NOTE
Arthur Garcia's chief complaint for this visit includes:  Chief Complaint   Patient presents with    Basal Cell Carcinoma     Left cheek     PCP: Camille Ellis    Referring Provider:  Referred Self, MD  No address on file    BP (!) 168/98 (BP Location: Left arm, Patient Position: Sitting, Cuff Size: Adult Large)   Pulse 59   Severe Pain (6)        Allergies   Allergen Reactions    Atorvastatin Muscle Pain (Myalgia)    Codeine Hives    Simvastatin Muscle Pain (Myalgia)     Other reaction(s): Muscle pain, Joint pain    Adhesive Tape Rash     Mepliex with border.    Medical Adhesive Remover Rash     Severely allergic to Coban - causing severe itching    Propoxyphene Rash and Swelling         Do you need any medication refills at today's visit?    No

## 2024-06-11 NOTE — NURSING NOTE
The following medication was given:     MEDICATION:  Lidocaine with epinephrine 1% [id number]  ROUTE: SQ  SITE: see procedure note  DOSE: 9.0 mL  LOT #: [id number]  : [employer]  EXPIRATION DATE: 4/30/25  NDC#: 65841-109-59  Was there drug waste? No  Multi-dose vial: Yes    Vaseline and pressure dressing applied to Mohs site on left cheek.  Wound care instructions reviewed with patient and AVS provided.  Patient verbalized understanding.  Patient will follow up for suture removal: N/A.  No further questions or concerns at this time.    Earlene Wu RN  June 11, 2024

## 2024-06-11 NOTE — PROGRESS NOTES
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 NMSC  - snBCC, left cheek, s/p shave 4/22/24, s/p MMS 6/11/24  - SCC, s/p excision, 1969     Date of Service:  Jun 11, 2024  Surgery: Mohs micrographic surgery    Case 1  Repair Type: Complex  Repair Size: 5.3 cm  Suture Material: 4-0 Monocryl / 5-0 Fast Absorbing Gut   Tumor Type: BCC - Basal Cell Carcinoma  Location: L Cheek  Derm-Path Accession #: [id number]   PreOp Size: 3.0 x 2.0 cm  PostOp Size: 2.5 x 3.4 cm  Mohs Accession #: [id number]  Level of Defect: Fat      Procedure:  We discussed the principles of treatment and most likely complications including scarring, bleeding, infection, swelling, pain, crusting, nerve damage, large wound,  incomplete excision, wound dehiscence,  nerve damage, recurrence, and a second procedure may be recommended to obtain the best cosmetic or functional result.    Informed consent was obtained and the patient underwent the procedure as follows:  The patient was placed supine on the operating table.  The cancer was identified, outlined with a marker, and verified by the patient.  The entire surgical field was prepped with Hibiclens.  The surgical site was anesthetized using Lidocaine 1% with epi 1 : 100,000.      The area of clinically apparent tumor was not debulked. The layer of tissue was then surgically excised using a #15 blade and was then transferred onto a specimen sheet maintaining the orientation of the specimen. Hemostasis was obtained using monopolar electrocoagulation. The wound site was then covered with a dressing while the tissue samples were processed for examination.    The excised tissue was transported to the Mohs histology laboratory  maintaining the tissue orientation.  The tissue specimen was relaxed so that the entire surgical margin was in a single horizontal plane for sectioning and inked for precise mapping.  A precise reference map was drawn to reflect the sectioning of the specimen, colored inking of the margins, and orientation on the patient. The tissue was processed using horizontal sectioning of the base and continuous peripheral margins.  The histopathologic sections were reviewed in conjunction with the reference map.    Total blocks: 2    Total slides:  3    There were no cancer cells visualized on examination, therefore Mohs surgery was complete.    Reconstruction: Complex Linear Closure    Due to one or more of the following factors, complex linear closure was required/indicated: Extensive undermining (equal to or greater than the maximum perpendicular width of the defect), exposure of underlying structure (bone, cartilage, tendon, named neurovascular), free margin involvement (helical rim, vermillion border, nostril rim), and/or placement of retention sutures.     The patient was taken to the operative suite and placed supine on the operating room table.  The defect was identified.  Appropriate markings were made with a marking pen to plan the repair.  The area was infiltrated with Lidocaine 1% with epi 1:100,000 and prepped with Hibiclens and draped with sterile towels.     The wound was debeveled and undermined widely.  Cones were excised within relaxed skin tension lines on both sides of the defect.  Hemostasis was obtained using electrocoagulation. A fascial plication suture using 4-0 Monocryl suture material was placed to narrow the primary defect. Subcutaneous tissues were then approximated using 4-0 Monocryl buried vertical mattress sutures.  The wound edges were then approximated additional 4-0 Monocryl buried sutures were placed in a similar fashion where needed.  Percutaneous simple running 5-0 fast absorbing gut  sutures were carefully placed for maximum eversion and meticulous approximation.    Repair Size: 5.3 cm    The wound was cleansed with saline and ointment was applied along the wound surface.     A sterile pressure dressing was applied.  Wound care instructions were given verbally and in writing.  The patient left the operating suite in stable condition.  Patient was informed that additional refinement of the resulting surgical scar may be used as a second stage of this reconstruction.     The attending surgeon was present for entire procedure and always immediately available.    Staff Involved:   Scribe/Staff    Scribe Disclosure:   I, SAE JOHNSON, am serving as a scribe; to document services personally performed by Dong Bright MD -based on data collection and the provider's statements to me.     Attending attestation:  I personally performed the entire procedure.  I have reviewed the note and edited it as necessary, and agree with its contents.    Dong Bright M.D.  Professor  Director of Dermatologic Surgery  Department of Dermatology  [facility]    Dermatology Surgery Clinic  [facility] Surgery [address] [address]

## 2024-06-11 NOTE — LETTER
6/11/2024      Arthur Garcia  [address] [address] [place] [id number]      Dear Colleague,    Thank you for referring your patient, Arthur Garcia, to the [facility]. Please see a copy of my visit note below.    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 NMSC  - snBCC, left cheek, s/p shave 4/22/24, s/p MMS 6/11/24  - SCC, s/p excision, 1969     Date of Service:  Jun 11, 2024  Surgery: Mohs micrographic surgery    Case 1  Repair Type: Complex  Repair Size: 5.3 cm  Suture Material: 4-0 Monocryl / 5-0 Fast Absorbing Gut   Tumor Type: BCC - Basal Cell Carcinoma  Location: L Cheek  Derm-Path Accession #: [id number]   PreOp Size: 3.0 x 2.0 cm  PostOp Size: 2.5 x 3.4 cm  Mohs Accession #: [id number]  Level of Defect: Fat      Procedure:  We discussed the principles of treatment and most likely complications including scarring, bleeding, infection, swelling, pain, crusting, nerve damage, large wound,  incomplete excision, wound dehiscence,  nerve damage, recurrence, and a second procedure may be recommended to obtain the best cosmetic or functional result.    Informed consent was obtained and the patient underwent the procedure as follows:  The patient was placed supine on the operating table.  The cancer was identified, outlined with a marker, and verified by the patient.  The entire surgical field was prepped with Hibiclens.  The surgical site was anesthetized using Lidocaine 1% with epi 1 : 100,000.      The area of clinically apparent tumor was not debulked. The layer of tissue was then surgically excised using a #15 blade and was then transferred onto a specimen sheet maintaining the orientation of the specimen.  Hemostasis was obtained using monopolar electrocoagulation. The wound site was then covered with a dressing while the tissue samples were processed for examination.    The excised tissue was transported to the [facility] histology laboratory maintaining the tissue orientation.  The tissue specimen was relaxed so that the entire surgical margin was in a single horizontal plane for sectioning and inked for precise mapping.  A precise reference map was drawn to reflect the sectioning of the specimen, colored inking of the margins, and orientation on the patient. The tissue was processed using horizontal sectioning of the base and continuous peripheral margins.  The histopathologic sections were reviewed in conjunction with the reference map.    Total blocks: 2    Total slides:  3    There were no cancer cells visualized on examination, therefore Mohs surgery was complete.    Reconstruction: Complex Linear Closure    Due to one or more of the following factors, complex linear closure was required/indicated: Extensive undermining (equal to or greater than the maximum perpendicular width of the defect), exposure of underlying structure (bone, cartilage, tendon, named neurovascular), free margin involvement (helical rim, vermillion border, nostril rim), and/or placement of retention sutures.     The patient was taken to the operative suite and placed supine on the operating room table.  The defect was identified.  Appropriate markings were made with a marking pen to plan the repair.  The area was infiltrated with Lidocaine 1% with epi 1:100,000 and prepped with Hibiclens and draped with sterile towels.     The wound was debeveled and undermined widely.  Cones were excised within relaxed skin tension lines on both sides of the defect.  Hemostasis was obtained using electrocoagulation. A fascial plication suture using 4-0 Monocryl suture material was placed to narrow the primary defect. Subcutaneous tissues were then approximated  using 4-0 Monocryl buried vertical mattress sutures.  The wound edges were then approximated additional 4-0 Monocryl buried sutures were placed in a similar fashion where needed.  Percutaneous simple running 5-0 fast absorbing gut sutures were carefully placed for maximum eversion and meticulous approximation.    Repair Size: 5.3 cm    The wound was cleansed with saline and ointment was applied along the wound surface.     A sterile pressure dressing was applied.  Wound care instructions were given verbally and in writing.  The patient left the operating suite in stable condition.  Patient was informed that additional refinement of the resulting surgical scar may be used as a second stage of this reconstruction.     The attending surgeon was present for entire procedure and always immediately available.    Staff Involved:   Scribe/Staff    Scribe Disclosure:   I, SAE JOHNSON, am serving as a scribe; to document services personally performed by Dong Bright MD -based on data collection and the provider's statements to me.     Attending attestation:  I personally performed the entire procedure.  I have reviewed the note and edited it as necessary, and agree with its contents.    Dong Bright M.D.  Professor  Director of Dermatologic Surgery  Department of Dermatology  [facility]    Dermatology Surgery Clinic  [facility] [place] [address]            Again, thank you for allowing me to participate in the care of your patient.        Sincerely,        Dong Bright MD

## 2024-06-13 ENCOUNTER — TELEPHONE (OUTPATIENT)
Dept: DERMATOLOGY | Facility: CLINIC | Age: 79
End: 2024-06-13
Payer: COMMERCIAL

## 2024-06-13 NOTE — TELEPHONE ENCOUNTER
Arthur is 2 days s/p Mohs on his left cheek.     What are you doing to manage your pain?  Ice  Is it helping?  yes  Have you had any noticeable bleeding through the bandage?  Initial pressure bandage was removed today.  Patient reports no active bleeding.  Wound edges are approximated.  It is swollen and bruised.    I provided reassurance and reviewed the wound care instructions.  I encouraged him to continue icing the area for 15-20 min every few hours.     Pt declined a post op appointment.  Next skin check has been scheduled.     Earlene Wu RN

## 2024-07-29 ENCOUNTER — TELEPHONE (OUTPATIENT)
Dept: DERMATOLOGY | Facility: CLINIC | Age: 79
End: 2024-07-29
Payer: COMMERCIAL

## 2024-07-29 NOTE — PROGRESS NOTES
[facility] Dermatology Note  Encounter Date: Jul 30, 2024  Office Visit     Reviewed patients past medical history and pertinent chart review prior to patients visit today.     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 Shave biopsy today.    Procedure Note: Biopsy by shave technique  The risks and benefits of the procedure were described to the patient. These include but are not limited to bleeding, infection, scar, incomplete removal, and non-diagnostic biopsy. Verbal informed consent was obtained. The above site(s) was cleansed with an alcohol pad and injected with 1% lidocaine with epinephrine. Once anesthesia was obtained, a biopsy(ies) was performed with Gilette blade. The tissue(s) was placed in a labeled container(s) with formalin and sent to pathology. Hemostasis was achieved with aluminum chloride. Vaseline and a bandage were applied to the wound(s). The patient tolerated the procedure well and was given post biopsy care instructions.    # Personal history of malignant melanoma and NMSC  # Multiple nevi, trunk and extremities  # Solar lentigines  - No signs of recurrence. Continued observation recommended.   - Nevi demonstrate no concerning features on dermoscopy. We discussed the importance of self exams at home.   - ABCDEs: Counseled ABCDEs of melanoma: Asymmetry, Border (irregularity), Color (not uniform, changes in color), Diameter  (greater than 6 mm which is about the size of a pencil eraser), and Evolving (any changes in preexisting moles).  - Sun protection: Counseled SPF 30+ sunscreen, UPF clothing, sun avoidance, tanning bed avoidance.    # Cherry angiomas  # Seborrheic keratoses  - We discussed the benign nature of the skin lesions. No treatment required. Continued observation recommended. Follow up with any concerns.      Follow-up:  3 months for follow up full body skin exam, as needed for new or changing lesions or new concerns    All risks, benefits and alternatives were discussed with patient.  Patient is in agreement and understands the assessment and plan.  All questions were answered.  Lolis Szymanski PA-C  [facility] Dermatology    ____________________________________________    CC: Derm Problem (FBSE- Areas of concern. Inner right thigh, toes, shins different color due to scar tissues, scalp, and back. One hard spot on the left cheek. )    HPI:  Mr. Arthur Garcia is a(n) 79 year old male who presents today as a return patient for a full body skin cancer screening. The patient has a history of malignant melanoma. Today, the patient reports a lesion on the right shin. The lesion was painful and the patient 'cut it out' himself. The lesion has returned. No other specific cutaneous concerns. He reports the mohs site on the left cheek has healed well. The patient reports trying to be diligent with photoprotection.      Physical Exam:  Vitals: There were no vitals taken for this visit.  SKIN: Total skin excluding the genitalia areas was performed. The exam included the scalp, face, neck, bilateral arms, chest, back, abdomen, bilateral legs, digits, mons pubis, buttocks, and nails.   - Mora II.  - Involving the right medial calf is a 0.4 x 1.0 cm tan nodule with overlying crust.   - Multiple tan/brown macules and papules scattered throughout exam, consistent with benign nevi. No concerning features on dermoscopy.   -  Scattered tan, homogenous macules scattered on sun exposed skin, consistent with solar lentigines.   - Scattered waxy, stuck on appearing papules and patches, consistent with seborrheic keratoses.  - Several 1-2 mm red dome shaped symmetric papules, consistent with cherry angiomas.     - No other lesions of concern on areas examined.     Medications:  Current Outpatient Medications   Medication Sig Dispense Refill    acetaminophen (TYLENOL) 325 MG tablet Take 2 tablets (650 mg) by mouth every 4 hours as needed for mild pain 50 tablet 0    albuterol (PROAIR HFA) 108 (90 Base) MCG/ACT inhaler by inhalation route every 4 hours as needed.      atenolol (TENORMIN) 25 MG tablet Take 25 mg by mouth daily Once daily      brimonidine (ALPHAGAN-P) 0.15 % ophthalmic solution 1 drop daily      buPROPion (WELLBUTRIN) 75 MG tablet       buPROPion (WELLBUTRIN) 75 MG tablet Once daily      celecoxib (CELEBREX) 100 MG capsule Take 100 mg by mouth 2 times daily      cholecalciferol 50 MCG (2000 UT) tablet TAKE ONE TABLET BY MOUTH DAILY FOR VITAMIN D SUPPLEMENT TAKE WITH EVENING MEAL.      cycloSPORINE (RESTASIS) 0.05 % ophthalmic emulsion Place 1 drop into both eyes 2 times daily      dexamethasone (DECADRON) 0.1 % ophthalmic suspension Apply 1 drop to eye      DULoxetine (CYMBALTA) 60 MG capsule TAKE ONE CAPSULE BY MOUTH EVERY MORNING FOR PAIN/DEPRESSION.      erythromycin (ROMYCIN) 5 MG/GM ophthalmic ointment Apply antibiotic ointment to all sutures three times a day, and 1/2 inch strip into the operated eye(s) at night. Use until follow up. 3.5 g 1    erythromycin (ROMYCIN) 5 MG/GM ophthalmic ointment Apply small amount to incision sites three times daily, then apply to inner lower lid of operative eye(s) at bedtime, as directed. 3.5 g 0    erythromycin (ROMYCIN) 5 MG/GM ophthalmic ointment Apply small amount to incision sites three times daily, then apply to inner lower lid of operative eye(s) at bedtime, as directed. 3.5 g 0     erythromycin (ROMYCIN) 5 MG/GM ophthalmic ointment Apply 1 strip to eye      febuxostat (ULORIC) 80 MG TABS tablet TAKE ONE TABLET BY MOUTH EVERY MORNING FOR GOUT      flunisolide (NASALIDE) 25 MCG/ACT (0.025%) SOLN spray Spray 2 sprays in nostril      fluticasone (FLONASE) 50 MCG/ACT nasal spray Spray 1 spray into both nostrils daily      ketorolac (ACULAR) 0.5 % ophthalmic solution Apply 1 drop to eye      loratadine (CLARITIN) 10 MG tablet Take 10 mg by mouth daily      melatonin 3 MG tablet Take 3-6 mg by mouth nightly as needed for sleep      methocarbamol (ROBAXIN) 750 MG tablet TAKE ONE TABLET BY MOUTH THREE TIMES A DAY AS NEEDED TO RELAX MUSCLES      Multiple Vitamin (THERA-TABS) TABS Take 1 tablet by mouth      neomycin-polymixin-dexAMETHasone (MAXITROL) 0.1 % ophthalmic suspension Please instill into the operative eye(s) four times daily until the bottle is finished. 5 mL 0    neomycin-polymixin-dexamethasone (MAXITROL) 0.1 % ophthalmic suspension Place 1 drop into the right eye 4 times daily 3 mL 0    polyethylene glycol-propylene glycol (SYSTANE ULTRA) 0.4-0.3 % SOLN ophthalmic solution INSTILL 1 DROP IN RIGHT EYE FOUR TIMES A DAY      pravastatin (PRAVACHOL) 40 MG tablet TAKE ONE TABLET BY MOUTH DAILY FOR CHOLESTEROL      prednisoLONE acetate (PRED FORTE) 1 % ophthalmic suspension Place 1 drop into the right eye 4 times daily      predniSONE (DELTASONE) 20 MG tablet 40 mg      tamsulosin (FLOMAX) 0.4 MG capsule TAKE TWO CAPSULES BY MOUTH ONCE A DAY FOR PROSTATE      triamterene-hydrochlorothiazide (MAXZIDE) 75-50 MG per tablet Take 1 tablet by mouth daily Once daily      vitamin C (ASCORBIC ACID) 500 MG tablet Take 500 mg by mouth daily      Witch Hazel (MEDICATED WIPES) 50 % PADS by topical route every 2 hours as needed.      allopurinol (ZYLOPRIM) 100 MG tablet Take 250 mg by mouth daily (Patient not taking: Reported on 5/16/2024)      aspirin (ASA) 81 MG chewable tablet Take 81 mg by mouth daily  (Patient not taking: Reported on 5/16/2024)      traZODone (DESYREL) 100 MG tablet Take 1 tablet by mouth at bedtime (Patient not taking: Reported on 7/30/2024)       No current facility-administered medications for this visit.      Past Medical History:   Patient Active Problem List   Diagnosis    CARDIOVASCULAR SCREENING; LDL GOAL LESS THAN 130    Pulmonary emphysema, unspecified emphysema type (H)    Affective psychosis (H24)    Obesity, morbid, BMI 40.0-49.9 (H)    Stage 3 chronic kidney disease, unspecified whether stage 3a or 3b CKD (H)    Malignant melanoma of conjunctiva, right (H)     Past Medical History:   Diagnosis Date    Arthritis     Depression     Disorder of lipoprotein and lipid metabolism     Gout     Hypertension     Sleep apnea        CC Lolis Szymanski PA-C  [address] [facility] [address], [facility] [address]  [place] [address] on close of this encounter.

## 2024-07-29 NOTE — TELEPHONE ENCOUNTER
Patient confirmed scheduled appointment:     Date: 7/30/24  Time: 12:45 PM  Visit type: Return dermatology  Provider: Lolis Szymanski PA-C  Location: [facility] [place]

## 2024-07-30 ENCOUNTER — OFFICE VISIT (OUTPATIENT)
Dept: DERMATOLOGY | Facility: CLINIC | Age: 79
End: 2024-07-30
Attending: PHYSICIAN ASSISTANT
Payer: COMMERCIAL

## 2024-07-30 DIAGNOSIS — L82.1 SEBORRHEIC KERATOSES: ICD-10-CM

## 2024-07-30 DIAGNOSIS — Z85.820 HISTORY OF MALIGNANT MELANOMA OF SKIN: ICD-10-CM

## 2024-07-30 DIAGNOSIS — Z12.83 ENCOUNTER FOR SCREENING FOR MALIGNANT NEOPLASM OF SKIN: ICD-10-CM

## 2024-07-30 DIAGNOSIS — D18.01 CHERRY ANGIOMA: ICD-10-CM

## 2024-07-30 DIAGNOSIS — D49.2 NEOPLASM OF UNSPECIFIED BEHAVIOR OF BONE, SOFT TISSUE, AND SKIN: ICD-10-CM

## 2024-07-30 DIAGNOSIS — L81.4 LENTIGINES: ICD-10-CM

## 2024-07-30 DIAGNOSIS — D22.9 MULTIPLE NEVI: ICD-10-CM

## 2024-07-30 DIAGNOSIS — Z85.828 HISTORY OF NONMELANOMA SKIN CANCER: Primary | ICD-10-CM

## 2024-07-30 PROCEDURE — 99213 OFFICE O/P EST LOW 20 MIN: CPT | Mod: 25 | Performed by: PHYSICIAN ASSISTANT

## 2024-07-30 PROCEDURE — 11102 TANGNTL BX SKIN SINGLE LES: CPT | Performed by: PHYSICIAN ASSISTANT

## 2024-07-30 PROCEDURE — 88305 TISSUE EXAM BY PATHOLOGIST: CPT | Mod: TC | Performed by: PHYSICIAN ASSISTANT

## 2024-07-30 PROCEDURE — 88305 TISSUE EXAM BY PATHOLOGIST: CPT | Mod: 26 | Performed by: DERMATOLOGY

## 2024-07-30 ASSESSMENT — PAIN SCALES - GENERAL: PAINLEVEL: NO PAIN (0)

## 2024-07-30 NOTE — LETTER
7/30/2024       RE: Arthur Garcia  [address] [facility] [id number]     Dear Colleague,    Thank you for referring your patient, Arthur Garcia, to the [facility] [place] at [facility]. Please see a copy of my visit note below.    [facility] Dermatology Note  Encounter Date: Jul 30, 2024  Office Visit     Reviewed patients past medical history and pertinent chart review prior to patients visit today.     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 Shave biopsy today.    Procedure Note: Biopsy by shave technique  The risks and benefits of the procedure were described to the patient. These include but are not limited to bleeding, infection, scar, incomplete removal, and non-diagnostic biopsy. Verbal informed consent was obtained. The above site(s) was cleansed with an alcohol pad and injected with 1% lidocaine with epinephrine. Once anesthesia was obtained, a biopsy(ies) was performed with Gilette blade. The tissue(s) was placed in a labeled container(s) with formalin and sent to pathology. Hemostasis was achieved with aluminum chloride. Vaseline and a bandage were applied to the wound(s). The patient tolerated the procedure well and was given post biopsy care instructions.    # Personal history of malignant melanoma and NMSC  # Multiple nevi, trunk and extremities  #  Solar lentigines  - No signs of recurrence. Continued observation recommended.   - Nevi demonstrate no concerning features on dermoscopy. We discussed the importance of self exams at home.   - ABCDEs: Counseled ABCDEs of melanoma: Asymmetry, Border (irregularity), Color (not uniform, changes in color), Diameter (greater than 6 mm which is about the size of a pencil eraser), and Evolving (any changes in preexisting moles).  - Sun protection: Counseled SPF 30+ sunscreen, UPF clothing, sun avoidance, tanning bed avoidance.    # Cherry angiomas  # Seborrheic keratoses  - We discussed the benign nature of the skin lesions. No treatment required. Continued observation recommended. Follow up with any concerns.      Follow-up:  3 months for follow up full body skin exam, as needed for new or changing lesions or new concerns    All risks, benefits and alternatives were discussed with patient.  Patient is in agreement and understands the assessment and plan.  All questions were answered.  Lolis Szymanski PA-C  [facility] Dermatology    ____________________________________________    CC: Derm Problem (FBSE- Areas of concern. Inner right thigh, toes, shins different color due to scar tissues, scalp, and back. One hard spot on the left cheek. )    HPI:  Mr. Arthur Garcia is a(n) 79 year old male who presents today as a return patient for a full body skin cancer screening. The patient has a history of malignant melanoma. Today, the patient reports a lesion on the right shin. The lesion was painful and the patient 'cut it out' himself. The lesion has returned. No other specific cutaneous concerns. He reports the mohs site on the left cheek has healed well. The patient reports trying to be diligent with photoprotection.      Physical Exam:  Vitals: There were no vitals taken for this visit.  SKIN: Total skin excluding the genitalia areas was performed. The exam included the scalp, face, neck, bilateral arms, chest, back,  abdomen, bilateral legs, digits, mons pubis, buttocks, and nails.   - Mora II.  - Involving the right medial calf is a 0.4 x 1.0 cm tan nodule with overlying crust.   - Multiple tan/brown macules and papules scattered throughout exam, consistent with benign nevi. No concerning features on dermoscopy.   - Scattered tan, homogenous macules scattered on sun exposed skin, consistent with solar lentigines.   - Scattered waxy, stuck on appearing papules and patches, consistent with seborrheic keratoses.  - Several 1-2 mm red dome shaped symmetric papules, consistent with cherry angiomas.     - No other lesions of concern on areas examined.     Medications:  Current Outpatient Medications   Medication Sig Dispense Refill     acetaminophen (TYLENOL) 325 MG tablet Take 2 tablets (650 mg) by mouth every 4 hours as needed for mild pain 50 tablet 0     albuterol (PROAIR HFA) 108 (90 Base) MCG/ACT inhaler by inhalation route every 4 hours as needed.       atenolol (TENORMIN) 25 MG tablet Take 25 mg by mouth daily Once daily       brimonidine (ALPHAGAN-P) 0.15 % ophthalmic solution 1 drop daily       buPROPion (WELLBUTRIN) 75 MG tablet        buPROPion (WELLBUTRIN) 75 MG tablet Once daily       celecoxib (CELEBREX) 100 MG capsule Take 100 mg by mouth 2 times daily       cholecalciferol 50 MCG (2000 UT) tablet TAKE ONE TABLET BY MOUTH DAILY FOR VITAMIN D SUPPLEMENT TAKE WITH EVENING MEAL.       cycloSPORINE (RESTASIS) 0.05 % ophthalmic emulsion Place 1 drop into both eyes 2 times daily       dexamethasone (DECADRON) 0.1 % ophthalmic suspension Apply 1 drop to eye       DULoxetine (CYMBALTA) 60 MG capsule TAKE ONE CAPSULE BY MOUTH EVERY MORNING FOR PAIN/DEPRESSION.       erythromycin (ROMYCIN) 5 MG/GM ophthalmic ointment Apply antibiotic ointment to all sutures three times a day, and 1/2 inch strip into the operated eye(s) at night. Use until follow up. 3.5 g 1     erythromycin (ROMYCIN) 5 MG/GM ophthalmic ointment Apply  small amount to incision sites three times daily, then apply to inner lower lid of operative eye(s) at bedtime, as directed. 3.5 g 0     erythromycin (ROMYCIN) 5 MG/GM ophthalmic ointment Apply small amount to incision sites three times daily, then apply to inner lower lid of operative eye(s) at bedtime, as directed. 3.5 g 0     erythromycin (ROMYCIN) 5 MG/GM ophthalmic ointment Apply 1 strip to eye       febuxostat (ULORIC) 80 MG TABS tablet TAKE ONE TABLET BY MOUTH EVERY MORNING FOR GOUT       flunisolide (NASALIDE) 25 MCG/ACT (0.025%) SOLN spray Spray 2 sprays in nostril       fluticasone (FLONASE) 50 MCG/ACT nasal spray Spray 1 spray into both nostrils daily       ketorolac (ACULAR) 0.5 % ophthalmic solution Apply 1 drop to eye       loratadine (CLARITIN) 10 MG tablet Take 10 mg by mouth daily       melatonin 3 MG tablet Take 3-6 mg by mouth nightly as needed for sleep       methocarbamol (ROBAXIN) 750 MG tablet TAKE ONE TABLET BY MOUTH THREE TIMES A DAY AS NEEDED TO RELAX MUSCLES       Multiple Vitamin (THERA-TABS) TABS Take 1 tablet by mouth       neomycin-polymixin-dexAMETHasone (MAXITROL) 0.1 % ophthalmic suspension Please instill into the operative eye(s) four times daily until the bottle is finished. 5 mL 0     neomycin-polymixin-dexamethasone (MAXITROL) 0.1 % ophthalmic suspension Place 1 drop into the right eye 4 times daily 3 mL 0     polyethylene glycol-propylene glycol (SYSTANE ULTRA) 0.4-0.3 % SOLN ophthalmic solution INSTILL 1 DROP IN RIGHT EYE FOUR TIMES A DAY       pravastatin (PRAVACHOL) 40 MG tablet TAKE ONE TABLET BY MOUTH DAILY FOR CHOLESTEROL       prednisoLONE acetate (PRED FORTE) 1 % ophthalmic suspension Place 1 drop into the right eye 4 times daily       predniSONE (DELTASONE) 20 MG tablet 40 mg       tamsulosin (FLOMAX) 0.4 MG capsule TAKE TWO CAPSULES BY MOUTH ONCE A DAY FOR PROSTATE       triamterene-hydrochlorothiazide (MAXZIDE) 75-50 MG per tablet Take 1 tablet by mouth daily Once  daily       vitamin C (ASCORBIC ACID) 500 MG tablet Take 500 mg by mouth daily       Witch Hazel (MEDICATED WIPES) 50 % PADS by topical route every 2 hours as needed.       allopurinol (ZYLOPRIM) 100 MG tablet Take 250 mg by mouth daily (Patient not taking: Reported on 5/16/2024)       aspirin (ASA) 81 MG chewable tablet Take 81 mg by mouth daily (Patient not taking: Reported on 5/16/2024)       traZODone (DESYREL) 100 MG tablet Take 1 tablet by mouth at bedtime (Patient not taking: Reported on 7/30/2024)       No current facility-administered medications for this visit.      Past Medical History:   Patient Active Problem List   Diagnosis     CARDIOVASCULAR SCREENING; LDL GOAL LESS THAN 130     Pulmonary emphysema, unspecified emphysema type (H)     Affective psychosis (H24)     Obesity, morbid, BMI 40.0-49.9 (H)     Stage 3 chronic kidney disease, unspecified whether stage 3a or 3b CKD (H)     Malignant melanoma of conjunctiva, right (H)     Past Medical History:   Diagnosis Date     Arthritis      Depression      Disorder of lipoprotein and lipid metabolism      Gout      Hypertension      Sleep apnea        CC Lolis Szymanski PA-C  [address], [facility] [address]  [place] [address] on close of this encounter.    Lidocaine-epinephrine 1-1:984034 % injection   .25 mL once for one use, starting 7/30/2024 ending 7/30/2024,  2mL disp, R-0, injection  Injected by Alejandrina Wills RN          Again, thank you for allowing me to participate in the care of your patient.      Sincerely,    Lolis Szymanski PA-C

## 2024-07-30 NOTE — PATIENT INSTRUCTIONS
Patient Education        Proper skin care from [facility]:     -Eliminate harsh soaps as they strip the natural oils from the skin, often resulting in dry itchy skin ( i.e. Dial, Zest, Romanian Spring)  -Use mild soaps such as Cetaphil or Dove Sensitive Skin in the shower. You do not need to use soap on arms, legs, and trunk every time you shower unless visibly soiled.   -Avoid hot or cold showers.  -After showering, lightly dry off and apply moisturizing within 2-3 minutes. This will help trap moisture in the skin.   -Aggressive use of a moisturizer at least 1-2 times a day to the entire body (including -Vanicream, Cetaphil, Aquaphor or Cerave) and moisturize hands after every washing.  -We recommend using moisturizers that come in a tub that needs to be scooped out, not a pump. This has more of an oil base. It will hold moisture in your skin much better than a water base moisturizer. The above recommended are non-pore clogging.        Wear a sunscreen with at least SPF 30 on your face, ears, neck and V of the chest daily. Wear sunscreen on other areas of the body if those areas are exposed to the sun throughout the day. Sunscreens can contain physical and/or chemical blockers. Physical blockers are less likely to clog pores, these include zinc oxide and titanium dioxide. Reapply every two hour and after swimming.      Sunscreen examples: https://www.ewg.org/sunscreen/     UV radiation  UVA radiation remains constant throughout the day and throughout the year. It is a longer wavelength than UVB and therefore penetrates deeper into the skin leading to immediate and delayed tanning, photoaging, and skin cancer. 70-80% of UVA and UVB radiation occurs between the hours of 10am-2pm.  UVB radiation  UVB radiation causes the most harmful effects and is more significant during the summer months. However, snow and ice can reflect UVB radiation leading to skin damage during the winter months as well. UVB radiation is  responsible for tanning, burning, inflammation, delayed erythema (pinkness), pigmentation (brown spots), and skin cancer.      I recommend self monthly full body exams and yearly full body exams with a dermatology provider. If you develop a new or changing lesion please follow up for examination. Most skin cancers are pink and scaly or pink and pearly. However, we do see blue/brown/black skin cancers.  Consider the ABCDEs of melanoma when giving yourself your monthly full body exam ( don't forget the groin, buttocks, feet, toes, etc). A-asymmetry, B-borders, C-color, D-diameter, E-elevation or evolving. If you see any of these changes please follow up in clinic. If you cannot see your back I recommend purchasing a hand held mirror to use with a larger wall mirror.       Checking for Skin Cancer  You can find cancer early by checking your skin each month. There are 3 kinds of skin cancer. They are melanoma, basal cell carcinoma, and squamous cell carcinoma. Doing monthly skin checks is the best way to find new marks or skin changes. Follow the instructions below for checking your skin.   The ABCDEs of checking moles for melanoma   Check your moles or growths for signs of melanoma using ABCDE:   Asymmetry: the sides of the mole or growth don t match  Border: the edges are ragged, notched, or blurred  Color: the color within the mole or growth varies  Diameter: the mole or growth is larger than 6 mm (size of a pencil eraser)  Evolving: the size, shape, or color of the mole or growth is changing (evolving is not shown in the images below)    Checking for other types of skin cancer  Basal cell carcinoma or squamous cell carcinoma have symptoms such as:      A spot or mole that looks different from all other marks on your skin  Changes in how an area feels, such as itching, tenderness, or pain  Changes in the skin's surface, such as oozing, bleeding, or scaliness  A sore that does not heal  New swelling or redness beyond  the border of a mole     Who s at risk?  Anyone can get skin cancer. But you are at greater risk if you have:   Fair skin, light-colored hair, or light-colored eyes  Many moles or abnormal moles on your skin  A history of sunburns from sunlight or tanning beds  A family history of skin cancer  A history of exposure to radiation or chemicals  A weakened immune system  If you have had skin cancer in the past, you are at risk for recurring skin cancer.   How to check your skin  Do your monthly skin checkups in front of a full-length mirror. Check all parts of your body, including your:   Head (ears, face, neck, and scalp)  Torso (front, back, and sides)  Arms (tops, undersides, upper, and lower armpits)  Hands (palms, backs, and fingers, including under the nails)  Buttocks and genitals  Legs (front, back, and sides)  Feet (tops, soles, toes, including under the nails, and between toes)  If you have a lot of moles, take digital photos of them each month. Make sure to take photos both up close and from a distance. These can help you see if any moles change over time.   Most skin changes are not cancer. But if you see any changes in your skin, call your doctor right away. Only he or she can diagnose a problem. If you have skin cancer, seeing your doctor can be the first step toward getting the treatment that could save your life.   [employer] last reviewed this educational content on 4/1/2019 2000-2020 [employer]. [address] [place] [address], [place] [address]. All rights reserved. This information is not intended as a substitute for professional medical care. Always follow your healthcare professional's instructions.        When should I call my doctor?  If you are worsening or not improving, please, contact us or seek urgent care as noted below.      Who should I call with questions (adults)?  [facility] (adult and pediatric): [phone]  [facility]  [place] (adult): [phone]  [facility] ([place], [place], [place] and [place]) [phone]  For urgent needs outside of business hours call the [facility] at [phone] and ask for the dermatology resident on call to be paged  If this is a medical emergency and you are unable to reach an ER, Call 911        If you need a prescription refill, please contact your pharmacy. Refills are approved or denied by our Physicians during normal business hours, Monday through Fridays  Per office policy, refills will not be granted if you have not been seen within the past year (or sooner depending on your child's condition)

## 2024-07-30 NOTE — PROGRESS NOTES
Lidocaine-epinephrine 1-1:388943 % injection   .25 mL once for one use, starting 7/30/2024 ending 7/30/2024,  2mL disp, R-0, injection  Injected by Alejandrina Wills RN

## 2024-07-30 NOTE — NURSING NOTE
Dermatology Rooming Note    Arthur Garcia's goals for this visit include:   Chief Complaint   Patient presents with    Derm Problem     FBSE- Areas of concern. Inner right thigh, toes, shins different color due to scar tissues, scalp, and back. One hard spot on the left cheek.      Yoav Winchester, EMT  Clinic Support  [facility]     [phone]    Employed by [facility]

## 2024-07-31 ENCOUNTER — TELEPHONE (OUTPATIENT)
Dept: DERMATOLOGY | Facility: CLINIC | Age: 79
End: 2024-07-31
Payer: COMMERCIAL

## 2024-07-31 NOTE — TELEPHONE ENCOUNTER
Patient confirmed scheduled appointment:     Date: 11/1/24  Time: 12:45 PM  Visit type: Return dermatology  Provider: Lolis Szymanski PA-C  Location: [facility]  Testing/imaging:   Additional Notes:

## 2024-10-11 ENCOUNTER — TRANSCRIBE ORDERS (OUTPATIENT)
Dept: OTHER | Age: 79
End: 2024-10-11

## 2024-10-11 ENCOUNTER — TRANSFERRED RECORDS (OUTPATIENT)
Dept: HEALTH INFORMATION MANAGEMENT | Facility: CLINIC | Age: 79
End: 2024-10-11
Payer: COMMERCIAL

## 2024-10-11 DIAGNOSIS — C69.01 MALIGNANT NEOPLASM OF RIGHT CONJUNCTIVA (H): Primary | ICD-10-CM

## 2024-10-21 ENCOUNTER — OFFICE VISIT (OUTPATIENT)
Dept: OPHTHALMOLOGY | Facility: CLINIC | Age: 79
End: 2024-10-21
Payer: COMMERCIAL

## 2024-10-21 DIAGNOSIS — H02.122 MECHANICAL ECTROPION OF RIGHT LOWER EYELID: Primary | ICD-10-CM

## 2024-10-21 DIAGNOSIS — C69.01 MALIGNANT NEOPLASM OF RIGHT CONJUNCTIVA (H): ICD-10-CM

## 2024-10-21 PROCEDURE — 99214 OFFICE O/P EST MOD 30 MIN: CPT | Mod: GC | Performed by: OPHTHALMOLOGY

## 2024-10-21 PROCEDURE — 92285 EXTERNAL OCULAR PHOTOGRAPHY: CPT | Mod: GC | Performed by: OPHTHALMOLOGY

## 2024-10-21 ASSESSMENT — CONF VISUAL FIELD
OD_NORMAL: 1
OD_SUPERIOR_TEMPORAL_RESTRICTION: 0
OS_INFERIOR_NASAL_RESTRICTION: 0
OD_SUPERIOR_NASAL_RESTRICTION: 0
COMMENTS: SOME PEAKING OU
METHOD: COUNTING FINGERS
OS_SUPERIOR_TEMPORAL_RESTRICTION: 0
OS_NORMAL: 1
OS_INFERIOR_TEMPORAL_RESTRICTION: 0
OS_SUPERIOR_NASAL_RESTRICTION: 0
OD_INFERIOR_TEMPORAL_RESTRICTION: 0
OD_INFERIOR_NASAL_RESTRICTION: 0

## 2024-10-21 ASSESSMENT — VISUAL ACUITY
OD_PH_CC+: -2
OD_CC+: -1
METHOD: SNELLEN - LINEAR
OD_PH_CC: 20/20
CORRECTION_TYPE: GLASSES
OD_CC: 20/25
OS_CC: 20/20
OS_CC+: -2

## 2024-10-21 ASSESSMENT — TONOMETRY
OS_IOP_MMHG: 11
IOP_METHOD: ICARE
OD_IOP_MMHG: 8

## 2024-10-21 ASSESSMENT — EXTERNAL EXAM - RIGHT EYE: OD_EXAM: NORMAL

## 2024-10-21 NOTE — NURSING NOTE
Chief Complaints and History of Present Illnesses   Patient presents with    Consult For     Arthur Garcia is being seen for a follow up by request of Dr. 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             Chief Complaint(s) and History of Present Illness(es)       Consult For              Laterality: both eyes    Comments: Arthurobed Garcia is being seen for a follow up by request of Dr. 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                      Comments    Right eye has begun to get red again over the past 4-5 months. There are concerns that cancer may be coming back.   Right eye is burning. Crispy yellow granules comes out of right eye in the morning.  Vision seem to remain stable right eye.     EES ointment right eye used at night but reports that some night forgets.     Castor oil on the outside eye lid this help with some comfort.         Stephanie Powell, COT COT 1:04 PM October 21, 2024

## 2024-10-21 NOTE — PROGRESS NOTES
Chief Complaints and History of Present Illnesses   Patient presents with    Consult For     Arthur Garcia is being seen for a follow up by request of Dr. 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             Chief Complaint(s) and History of Present Illness(es)     Consult For    In both eyes. Additional comments: Arthur Garcia is being seen for a   follow up by request of Dr. Suma Arango due lid concern right eye   concerns due to .H/o malignant melanoma of right lower eyelid including   canthus s/p repair w/Early flap.                   Comments    Right eye has begun to get red again over the past 4-5 months. There are   concerns that cancer may be coming back.   Right eye is burning. Crispy yellow granules comes out of right eye in the   morning.  Vision seem to remain stable right eye.     EES ointment right eye used at night but reports that some night forgets.     Castor oil on the outside eye lid this help with some comfort.     Stephanie Powell, COT COT 1:04 PM October 21, 2024          Assessment & Plan     Arthur Garcia is a 79 year old male with the following diagnoses:   1. Mechanical ectropion of right lower eyelid    2. Malignant neoplasm of right conjunctiva (H)       History of malignant melanoma of the right lower eyelid s/p multiple resections, most recent with negative margins, now reporting ~4 months of lower eyelid redness in location of prior melanoma    PLAN:  - Return to OR for right lower eyelid ectropion repair (lateral tarsal strip + reverse Quickert sutures) and biopsy to r/o recurrence          Attending Physician Attestation:  Complete documentation of historical and exam elements from today's encounter can be found in the full encounter summary report (not reduplicated in this progress note).  I personally obtained the chief complaint(s) and history of present illness.  I  confirmed and edited as necessary the review of systems, past medical/surgical history, family history, social history, and examination findings as documented by others; and I examined the patient myself.  I personally reviewed the relevant tests, images, and reports as documented above.  I formulated and edited as necessary the assessment and plan and discussed the findings and management plan with the patient and family. -Keven Gomez MD  1:19 PM 10/21/2024    Today with Arthur Garcia, I reviewed the indications, risks, benefits, and alternatives of the proposed surgical procedure including, but not limited to, failure obtain the desired result  and need for additional surgery, bleeding, infection, loss of vision, loss of the eye, and the remote possibility of permanent damage to any organ system or death with the use of anesthesia.  I provided multiple opportunities for the questions, answered all questions to the best of my ability, and confirmed that my answers and my discussion were understood.     - Keven Gomez MD 1:48 PM 10/21/2024

## 2024-10-23 ENCOUNTER — TELEPHONE (OUTPATIENT)
Dept: OPHTHALMOLOGY | Facility: CLINIC | Age: 79
End: 2024-10-23
Payer: COMMERCIAL

## 2024-10-23 NOTE — TELEPHONE ENCOUNTER
Called patient to schedule surgery with Dr Gomez    Spoke with: Arthur    Date(s) of Surgery: 11/27/24    Patient aware of approximate arrival time: Yes      [facility] of [facility]     [facility]     Informed patient that they need to call to schedule pre-op H&P within 30 days of surgery date: Yes      Post-Op Appt Dates: 12/9       Discussed with patient pre-op RN will call 2-3 days prior to surgery with arrival time and instructions:  Yes       Standard Surgery Packet Sent: Yes 10/23/24  via Mail - Standard      Additional Information Sent in Packet:  NA       Informed patient that they will need an adult  to bring patient home from surgery: Yes  : TBD ( patient is aware that they need a )NA         Additional Comments:  NA      All patients questions were answered and was instructed to review surgical packet and call back [phone] with any questions or concerns.       Lizabeth Francois on 10/23/2024 at 5:00 PM

## 2024-10-24 ENCOUNTER — TRANSFERRED RECORDS (OUTPATIENT)
Dept: HEALTH INFORMATION MANAGEMENT | Facility: CLINIC | Age: 79
End: 2024-10-24
Payer: COMMERCIAL

## 2024-11-01 ENCOUNTER — OFFICE VISIT (OUTPATIENT)
Dept: DERMATOLOGY | Facility: CLINIC | Age: 79
End: 2024-11-01
Attending: PHYSICIAN ASSISTANT
Payer: COMMERCIAL

## 2024-11-01 DIAGNOSIS — Z85.820 HISTORY OF MALIGNANT MELANOMA OF SKIN: ICD-10-CM

## 2024-11-01 DIAGNOSIS — D18.01 CHERRY ANGIOMA: ICD-10-CM

## 2024-11-01 DIAGNOSIS — L82.1 SEBORRHEIC KERATOSES: ICD-10-CM

## 2024-11-01 DIAGNOSIS — L81.4 LENTIGINES: ICD-10-CM

## 2024-11-01 DIAGNOSIS — Z12.83 ENCOUNTER FOR SCREENING FOR MALIGNANT NEOPLASM OF SKIN: Primary | ICD-10-CM

## 2024-11-01 DIAGNOSIS — L21.9 SEBORRHEIC DERMATITIS: ICD-10-CM

## 2024-11-01 DIAGNOSIS — D22.9 MULTIPLE NEVI: ICD-10-CM

## 2024-11-01 DIAGNOSIS — Z85.828 HISTORY OF NONMELANOMA SKIN CANCER: ICD-10-CM

## 2024-11-01 PROCEDURE — 99213 OFFICE O/P EST LOW 20 MIN: CPT | Performed by: PHYSICIAN ASSISTANT

## 2024-11-01 RX ORDER — KETOCONAZOLE 20 MG/ML
SHAMPOO, SUSPENSION TOPICAL
Qty: 120 ML | Refills: 11 | Status: SHIPPED | OUTPATIENT
Start: 2024-11-01

## 2024-11-01 ASSESSMENT — PAIN SCALES - GENERAL: PAINLEVEL_OUTOF10: NO PAIN (0)

## 2024-11-01 NOTE — LETTER
11/1/2024       RE: Arthur Garcia  [address] [facility] [address]     Dear Colleague,    Thank you for referring your patient, Arthur Garcia, to the [facility] [place] [facility]. Please see a copy of my visit note below.    [facility] D[facility]ology Note  Encounter Date: Nov 1, 2024  Office Visit     Reviewed patients past medical history and pertinent chart review prior to patients visit today.     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 Plan as follows:  - Start ketoconazole 2% shampoo three times weekly. Advised to lather in the shower, waiting 5-10 minutes before rinsing.     # Personal history of malignant melanoma  # Multiple nevi, trunk and extremities  # Solar lentigines  - No signs of recurrence. Continued observation recommended.   - Nevi demonstrate no concerning features on dermoscopy. We discussed the importance of self exams at home.   - ABCDEs: Counseled ABCDEs of melanoma: Asymmetry, Border (irregularity), Color (not uniform, changes in color), Diameter (greater than 6 mm which is about the size of a pencil eraser), and Evolving (any changes in preexisting moles).  - Sun protection: Counseled SPF 30+ sunscreen, UPF clothing, sun avoidance, tanning bed avoidance.    # Cherry angiomas  # Seborrheic keratoses  - We discussed the benign  nature of the skin lesions. No treatment required. Continued observation recommended. Follow up with any concerns.      Follow-up:  3-6 months for follow up full body skin exam, as needed for new or changing lesions or new concerns    All risks, benefits and alternatives were discussed with patient.  Patient is in agreement and understands the assessment and plan.  All questions were answered.  Lolis Szymanski PA-C  [facility]    ____________________________________________    CC: Derm Problem (3 mo FBSE - no areas of concern to pt)    HPI:  Mr. Arthur Garcia is a(n) 79 year old male who presents today as a return patient for a full body skin cancer screening. The patient has a history of malignant melanoma. No specific cutaneous concerns. The patient reports trying to be diligent with photoprotection.      Physical Exam:  Vitals: There were no vitals taken for this visit.  LYMPH: No cervical or axillary lymphadenopathy.   SKIN: Total skin excluding the genitalia areas was performed. The exam included the scalp, face, neck, bilateral arms, chest, back, abdomen, bilateral legs, digits, mons pubis, buttocks, and nails.   - Mora II.  - Greasy yellow scale with background erythema involving the scalp.   - Multiple tan/brown macules and papules scattered throughout exam, consistent with benign nevi. No concerning features on dermoscopy.   - Scattered tan, homogenous macules scattered on sun exposed skin, consistent with solar lentigines.   - Scattered waxy, stuck on appearing papules and patches, consistent with seborrheic keratoses.  - Several 1-2 mm red dome shaped symmetric papules, consistent with cherry angiomas.     - No other lesions of concern on areas examined.     Medications:  Current Outpatient Medications   Medication Sig Dispense Refill     acetaminophen (TYLENOL) 325 MG tablet Take 2 tablets (650 mg) by mouth every 4 hours as needed for mild pain 50 tablet 0     albuterol (PROAIR  HFA) 108 (90 Base) MCG/ACT inhaler by inhalation route every 4 hours as needed.       atenolol (TENORMIN) 25 MG tablet Take 25 mg by mouth daily Once daily       brimonidine (ALPHAGAN-P) 0.15 % ophthalmic solution 1 drop daily       buPROPion (WELLBUTRIN) 75 MG tablet        buPROPion (WELLBUTRIN) 75 MG tablet Once daily       celecoxib (CELEBREX) 100 MG capsule Take 100 mg by mouth 2 times daily       cholecalciferol 50 MCG (2000 UT) tablet TAKE ONE TABLET BY MOUTH DAILY FOR VITAMIN D SUPPLEMENT TAKE WITH EVENING MEAL.       cycloSPORINE (RESTASIS) 0.05 % ophthalmic emulsion Place 1 drop into both eyes 2 times daily       dexamethasone (DECADRON) 0.1 % ophthalmic suspension Apply 1 drop to eye       DULoxetine (CYMBALTA) 60 MG capsule TAKE ONE CAPSULE BY MOUTH EVERY MORNING FOR PAIN/DEPRESSION.       erythromycin (ROMYCIN) 5 MG/GM ophthalmic ointment Apply antibiotic ointment to all sutures three times a day, and 1/2 inch strip into the operated eye(s) at night. Use until follow up. 3.5 g 1     erythromycin (ROMYCIN) 5 MG/GM ophthalmic ointment Apply small amount to incision sites three times daily, then apply to inner lower lid of operative eye(s) at bedtime, as directed. 3.5 g 0     erythromycin (ROMYCIN) 5 MG/GM ophthalmic ointment Apply small amount to incision sites three times daily, then apply to inner lower lid of operative eye(s) at bedtime, as directed. 3.5 g 0     erythromycin (ROMYCIN) 5 MG/GM ophthalmic ointment Apply 1 strip to eye       febuxostat (ULORIC) 80 MG TABS tablet TAKE ONE TABLET BY MOUTH EVERY MORNING FOR GOUT       flunisolide (NASALIDE) 25 MCG/ACT (0.025%) SOLN spray Spray 2 sprays in nostril       fluticasone (FLONASE) 50 MCG/ACT nasal spray Spray 1 spray into both nostrils daily       ketorolac (ACULAR) 0.5 % ophthalmic solution Apply 1 drop to eye       loratadine (CLARITIN) 10 MG tablet Take 10 mg by mouth daily       melatonin 3 MG tablet Take 3-6 mg by mouth nightly as needed for  sleep       methocarbamol (ROBAXIN) 750 MG tablet TAKE ONE TABLET BY MOUTH THREE TIMES A DAY AS NEEDED TO RELAX MUSCLES       Multiple Vitamin (THERA-TABS) TABS Take 1 tablet by mouth       neomycin-polymixin-dexAMETHasone (MAXITROL) 0.1 % ophthalmic suspension Please instill into the operative eye(s) four times daily until the bottle is finished. 5 mL 0     neomycin-polymixin-dexamethasone (MAXITROL) 0.1 % ophthalmic suspension Place 1 drop into the right eye 4 times daily 3 mL 0     polyethylene glycol-propylene glycol (SYSTANE ULTRA) 0.4-0.3 % SOLN ophthalmic solution INSTILL 1 DROP IN RIGHT EYE FOUR TIMES A DAY       pravastatin (PRAVACHOL) 40 MG tablet TAKE ONE TABLET BY MOUTH DAILY FOR CHOLESTEROL       prednisoLONE acetate (PRED FORTE) 1 % ophthalmic suspension Place 1 drop into the right eye 4 times daily       predniSONE (DELTASONE) 20 MG tablet 40 mg       semaglutide (OZEMPIC) 2 MG/3ML pen Inject subcutaneously every 7 days.       tamsulosin (FLOMAX) 0.4 MG capsule TAKE TWO CAPSULES BY MOUTH ONCE A DAY FOR PROSTATE       triamterene-hydrochlorothiazide (MAXZIDE) 75-50 MG per tablet Take 1 tablet by mouth daily Once daily       vitamin C (ASCORBIC ACID) 500 MG tablet Take 500 mg by mouth daily       Witch Hazel (MEDICATED WIPES) 50 % PADS by topical route every 2 hours as needed.       allopurinol (ZYLOPRIM) 100 MG tablet Take 250 mg by mouth daily (Patient not taking: Reported on 11/1/2024)       aspirin (ASA) 81 MG chewable tablet Take 81 mg by mouth daily (Patient not taking: Reported on 11/1/2024)       traZODone (DESYREL) 100 MG tablet Take 1 tablet by mouth at bedtime (Patient not taking: Reported on 11/1/2024)       No current facility-administered medications for this visit.      Past Medical History:   Patient Active Problem List   Diagnosis     CARDIOVASCULAR SCREENING; LDL GOAL LESS THAN 130     Pulmonary emphysema, unspecified emphysema type (H)     Affective psychosis (H)     Obesity, morbid,  BMI 40.0-49.9 (H)     Stage 3 chronic kidney disease, unspecified whether stage 3a or 3b CKD (H)     Malignant melanoma of conjunctiva, right (H)     Past Medical History:   Diagnosis Date     Arthritis      Depression      Disorder of lipoprotein and lipid metabolism      Gout      Hypertension      Sleep apnea        CC Lolis Szymanski PA-C  [address] [facility] [address], [facility] [address]  [place] [address] on close of this encounter.      Again, thank you for allowing me to participate in the care of your patient.      Sincerely,    Lolis Szymanski PA-C

## 2024-11-01 NOTE — PROGRESS NOTES
[facility] Dermatology Note  Encounter Date: Nov 1, 2024  Office Visit     Reviewed patients past medical history and pertinent chart review prior to patients visit today.     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 Plan as follows:  - Start ketoconazole 2% shampoo three times weekly. Advised to lather in the shower, waiting 5-10 minutes before rinsing.     # Personal history of malignant melanoma  # Multiple nevi, trunk and extremities  # Solar lentigines  - No signs of recurrence. Continued observation recommended.   - Nevi demonstrate no concerning features on dermoscopy. We discussed the importance of self exams at home.   - ABCDEs: Counseled ABCDEs of melanoma: Asymmetry, Border (irregularity), Color (not uniform, changes in color), Diameter (greater than 6 mm which is about the size of a pencil eraser), and Evolving (any changes in preexisting moles).  - Sun protection: Counseled SPF 30+ sunscreen, UPF clothing, sun avoidance, tanning bed avoidance.    # Cherry angiomas  # Seborrheic keratoses  - We discussed the benign nature of the skin lesions. No treatment required. Continued observation recommended. Follow up with any concerns.      Follow-up:  3-6 months for follow up full body skin exam, as needed for new or changing lesions or new concerns    All risks, benefits and alternatives were discussed with patient.  Patient is in  agreement and understands the assessment and plan.  All questions were answered.  Lolis Szymanski PA-C  [facility]    ____________________________________________    CC: Derm Problem (3 mo FBSE - no areas of concern to pt)    HPI:  Mr. Arthur Garcia is a(n) 79 year old male who presents today as a return patient for a full body skin cancer screening. The patient has a history of malignant melanoma. No specific cutaneous concerns. The patient reports trying to be diligent with photoprotection.      Physical Exam:  Vitals: There were no vitals taken for this visit.  LYMPH: No cervical or axillary lymphadenopathy.   SKIN: Total skin excluding the genitalia areas was performed. The exam included the scalp, face, neck, bilateral arms, chest, back, abdomen, bilateral legs, digits, mons pubis, buttocks, and nails.   - Mora II.  - Greasy yellow scale with background erythema involving the scalp.   - Multiple tan/brown macules and papules scattered throughout exam, consistent with benign nevi. No concerning features on dermoscopy.   - Scattered tan, homogenous macules scattered on sun exposed skin, consistent with solar lentigines.   - Scattered waxy, stuck on appearing papules and patches, consistent with seborrheic keratoses.  - Several 1-2 mm red dome shaped symmetric papules, consistent with cherry angiomas.     - No other lesions of concern on areas examined.     Medications:  Current Outpatient Medications   Medication Sig Dispense Refill    acetaminophen (TYLENOL) 325 MG tablet Take 2 tablets (650 mg) by mouth every 4 hours as needed for mild pain 50 tablet 0    albuterol (PROAIR HFA) 108 (90 Base) MCG/ACT inhaler by inhalation route every 4 hours as needed.      atenolol (TENORMIN) 25 MG tablet Take 25 mg by mouth daily Once daily      brimonidine (ALPHAGAN-P) 0.15 % ophthalmic solution 1 drop daily      buPROPion (WELLBUTRIN) 75 MG tablet       buPROPion (WELLBUTRIN) 75 MG tablet Once  daily      celecoxib (CELEBREX) 100 MG capsule Take 100 mg by mouth 2 times daily      cholecalciferol 50 MCG (2000 UT) tablet TAKE ONE TABLET BY MOUTH DAILY FOR VITAMIN D SUPPLEMENT TAKE WITH EVENING MEAL.      cycloSPORINE (RESTASIS) 0.05 % ophthalmic emulsion Place 1 drop into both eyes 2 times daily      dexamethasone (DECADRON) 0.1 % ophthalmic suspension Apply 1 drop to eye      DULoxetine (CYMBALTA) 60 MG capsule TAKE ONE CAPSULE BY MOUTH EVERY MORNING FOR PAIN/DEPRESSION.      erythromycin (ROMYCIN) 5 MG/GM ophthalmic ointment Apply antibiotic ointment to all sutures three times a day, and 1/2 inch strip into the operated eye(s) at night. Use until follow up. 3.5 g 1    erythromycin (ROMYCIN) 5 MG/GM ophthalmic ointment Apply small amount to incision sites three times daily, then apply to inner lower lid of operative eye(s) at bedtime, as directed. 3.5 g 0    erythromycin (ROMYCIN) 5 MG/GM ophthalmic ointment Apply small amount to incision sites three times daily, then apply to inner lower lid of operative eye(s) at bedtime, as directed. 3.5 g 0    erythromycin (ROMYCIN) 5 MG/GM ophthalmic ointment Apply 1 strip to eye      febuxostat (ULORIC) 80 MG TABS tablet TAKE ONE TABLET BY MOUTH EVERY MORNING FOR GOUT      flunisolide (NASALIDE) 25 MCG/ACT (0.025%) SOLN spray Spray 2 sprays in nostril      fluticasone (FLONASE) 50 MCG/ACT nasal spray Spray 1 spray into both nostrils daily      ketorolac (ACULAR) 0.5 % ophthalmic solution Apply 1 drop to eye      loratadine (CLARITIN) 10 MG tablet Take 10 mg by mouth daily      melatonin 3 MG tablet Take 3-6 mg by mouth nightly as needed for sleep      methocarbamol (ROBAXIN) 750 MG tablet TAKE ONE TABLET BY MOUTH THREE TIMES A DAY AS NEEDED TO RELAX MUSCLES      Multiple Vitamin (THERA-TABS) TABS Take 1 tablet by mouth      neomycin-polymixin-dexAMETHasone (MAXITROL) 0.1 % ophthalmic suspension Please instill into the operative eye(s) four times daily until the bottle  is finished. 5 mL 0    neomycin-polymixin-dexamethasone (MAXITROL) 0.1 % ophthalmic suspension Place 1 drop into the right eye 4 times daily 3 mL 0    polyethylene glycol-propylene glycol (SYSTANE ULTRA) 0.4-0.3 % SOLN ophthalmic solution INSTILL 1 DROP IN RIGHT EYE FOUR TIMES A DAY      pravastatin (PRAVACHOL) 40 MG tablet TAKE ONE TABLET BY MOUTH DAILY FOR CHOLESTEROL      prednisoLONE acetate (PRED FORTE) 1 % ophthalmic suspension Place 1 drop into the right eye 4 times daily      predniSONE (DELTASONE) 20 MG tablet 40 mg      semaglutide (OZEMPIC) 2 MG/3ML pen Inject subcutaneously every 7 days.      tamsulosin (FLOMAX) 0.4 MG capsule TAKE TWO CAPSULES BY MOUTH ONCE A DAY FOR PROSTATE      triamterene-hydrochlorothiazide (MAXZIDE) 75-50 MG per tablet Take 1 tablet by mouth daily Once daily      vitamin C (ASCORBIC ACID) 500 MG tablet Take 500 mg by mouth daily      Witch Hazel (MEDICATED WIPES) 50 % PADS by topical route every 2 hours as needed.      allopurinol (ZYLOPRIM) 100 MG tablet Take 250 mg by mouth daily (Patient not taking: Reported on 11/1/2024)      aspirin (ASA) 81 MG chewable tablet Take 81 mg by mouth daily (Patient not taking: Reported on 11/1/2024)      traZODone (DESYREL) 100 MG tablet Take 1 tablet by mouth at bedtime (Patient not taking: Reported on 11/1/2024)       No current facility-administered medications for this visit.      Past Medical History:   Patient Active Problem List   Diagnosis    CARDIOVASCULAR SCREENING; LDL GOAL LESS THAN 130    Pulmonary emphysema, unspecified emphysema type (H)    Affective psychosis (H)    Obesity, morbid, BMI 40.0-49.9 (H)    Stage 3 chronic kidney disease, unspecified whether stage 3a or 3b CKD (H)    Malignant melanoma of conjunctiva, right (H)     Past Medical History:   Diagnosis Date    Arthritis     Depression     Disorder of lipoprotein and lipid metabolism     Gout     Hypertension     Sleep apnea        CC Lolis Szymanski PA-C  [address] [place] [address]     [address], [facility] [address]  [place] [address] on close of this encounter.

## 2024-11-01 NOTE — NURSING NOTE
Dermatology Rooming Note    Arthur Garcia's goals for this visit include:   Chief Complaint   Patient presents with    Derm Problem     3 mo FBSE - no areas of concern to pt     Tasneem Kemp, EMT

## 2024-11-01 NOTE — PATIENT INSTRUCTIONS
Patient Education        Proper skin care from [facility]:     -Eliminate harsh soaps as they strip the natural oils from the skin, often resulting in dry itchy skin ( i.e. Dial, Zest, [place])  -Use mild soaps such as Cetaphil or Dove Sensitive Skin in the shower. You do not need to use soap on arms, legs, and trunk every time you shower unless visibly soiled.   -Avoid hot or cold showers.  -After showering, lightly dry off and apply moisturizing within 2-3 minutes. This will help trap moisture in the skin.   -Aggressive use of a moisturizer at least 1-2 times a day to the entire body (including -Vanicream, Cetaphil, Aquaphor or Cerave) and moisturize hands after every washing.  -We recommend using moisturizers that come in a tub that needs to be scooped out, not a pump. This has more of an oil base. It will hold moisture in your skin much better than a water base moisturizer. The above recommended are non-pore clogging.        Wear a sunscreen with at least SPF 30 on your face, ears, neck and V of the chest daily. Wear sunscreen on other areas of the body if those areas are exposed to the sun throughout the day. Sunscreens can contain physical and/or chemical blockers. Physical blockers are less likely to clog pores, these include zinc oxide and titanium dioxide. Reapply every two hour and after swimming.      Sunscreen examples: https://www.ewg.org/sunscreen/     UV radiation  UVA radiation remains constant throughout the day and throughout the year. It is a longer wavelength than UVB and therefore penetrates deeper into the skin leading to immediate and delayed tanning, photoaging, and skin cancer. 70-80% of UVA and UVB radiation occurs between the hours of 10am-2pm.  UVB radiation  UVB radiation causes the most harmful effects and is more significant during the summer months. However, snow and ice can reflect UVB radiation leading to skin damage during the winter months as well. UVB radiation is  responsible for tanning, burning, inflammation, delayed erythema (pinkness), pigmentation (brown spots), and skin cancer.      I recommend self monthly full body exams and yearly full body exams with a dermatology provider. If you develop a new or changing lesion please follow up for examination. Most skin cancers are pink and scaly or pink and pearly. However, we do see blue/brown/black skin cancers.  Consider the ABCDEs of melanoma when giving yourself your monthly full body exam ( don't forget the groin, buttocks, feet, toes, etc). A-asymmetry, B-borders, C-color, D-diameter, E-elevation or evolving. If you see any of these changes please follow up in clinic. If you cannot see your back I recommend purchasing a hand held mirror to use with a larger wall mirror.       Checking for Skin Cancer  You can find cancer early by checking your skin each month. There are 3 kinds of skin cancer. They are melanoma, basal cell carcinoma, and squamous cell carcinoma. Doing monthly skin checks is the best way to find new marks or skin changes. Follow the instructions below for checking your skin.   The ABCDEs of checking moles for melanoma   Check your moles or growths for signs of melanoma using ABCDE:   Asymmetry: the sides of the mole or growth don t match  Border: the edges are ragged, notched, or blurred  Color: the color within the mole or growth varies  Diameter: the mole or growth is larger than 6 mm (size of a pencil eraser)  Evolving: the size, shape, or color of the mole or growth is changing (evolving is not shown in the images below)    Checking for other types of skin cancer  Basal cell carcinoma or squamous cell carcinoma have symptoms such as:      A spot or mole that looks different from all other marks on your skin  Changes in how an area feels, such as itching, tenderness, or pain  Changes in the skin's surface, such as oozing, bleeding, or scaliness  A sore that does not heal  New swelling or redness beyond  the border of a mole     Who s at risk?  Anyone can get skin cancer. But you are at greater risk if you have:   Fair skin, light-colored hair, or light-colored eyes  Many moles or abnormal moles on your skin  A history of sunburns from sunlight or tanning beds  A family history of skin cancer  A history of exposure to radiation or chemicals  A weakened immune system  If you have had skin cancer in the past, you are at risk for recurring skin cancer.   How to check your skin  Do your monthly skin checkups in front of a full-length mirror. Check all parts of your body, including your:   Head (ears, face, neck, and scalp)  Torso (front, back, and sides)  Arms (tops, undersides, upper, and lower armpits)  Hands (palms, backs, and fingers, including under the nails)  Buttocks and genitals  Legs (front, back, and sides)  Feet (tops, soles, toes, including under the nails, and between toes)  If you have a lot of moles, take digital photos of them each month. Make sure to take photos both up close and from a distance. These can help you see if any moles change over time.   Most skin changes are not cancer. But if you see any changes in your skin, call your doctor right away. Only he or she can diagnose a problem. If you have skin cancer, seeing your doctor can be the first step toward getting the treatment that could save your life.   [employer] last reviewed this educational content on 4/1/2019 2000-2020 [employer]. [address] [place] [address], [place] [address]. All rights reserved. This information is not intended as a substitute for professional medical care. Always follow your healthcare professional's instructions.        When should I call my doctor?  If you are worsening or not improving, please, contact us or seek urgent care as noted below.      Who should I call with questions (adults)?  [facility] (adult and pediatric): [phone]  [facility]  [place] (adult): [phone]  [facility] ([place], [place], [place] and [place]) [phone]  For urgent needs outside of business hours call the [facility] at [phone] and ask for the dermatology resident on call to be paged  If this is a medical emergency and you are unable to reach an ER, Call 911        If you need a prescription refill, please contact your pharmacy. Refills are approved or denied by our Physicians during normal business hours, Monday through Fridays  Per office policy, refills will not be granted if you have not been seen within the past year (or sooner depending on your child's condition)

## 2024-11-14 ENCOUNTER — TELEPHONE (OUTPATIENT)
Dept: OPHTHALMOLOGY | Facility: CLINIC | Age: 79
End: 2024-11-14
Payer: COMMERCIAL

## 2024-11-14 NOTE — TELEPHONE ENCOUNTER
Your patient is scheduled for surgery and has medication(s) that may need to be held prior to surgery.     To avoid possible cancellation please make sure patient has a plan for the following medications:       Ozempic needs to be stopped at least 7 days prior to surgery     --    I received above message from pre-op team.    Pt scheduled for surgery November 27th.    I will reach out to patient.    Bhavik Frey RN 7:56 AM 11/14/24

## 2024-11-14 NOTE — TELEPHONE ENCOUNTER
Spoke to pt about medication/Ozempic at 0820 and pt already received call this AM regarding medication and aware to stop at least 7 days prior to surgery.    Bhavik Frey RN 8:34 AM 11/14/24

## 2024-11-15 ENCOUNTER — ANCILLARY PROCEDURE (OUTPATIENT)
Dept: CT IMAGING | Facility: CLINIC | Age: 79
End: 2024-11-15
Attending: INTERNAL MEDICINE
Payer: COMMERCIAL

## 2024-11-15 ENCOUNTER — TELEPHONE (OUTPATIENT)
Dept: OPHTHALMOLOGY | Facility: CLINIC | Age: 79
End: 2024-11-15
Payer: COMMERCIAL

## 2024-11-15 ENCOUNTER — LAB (OUTPATIENT)
Dept: LAB | Facility: CLINIC | Age: 79
End: 2024-11-15
Payer: COMMERCIAL

## 2024-11-15 DIAGNOSIS — C69.01 MALIGNANT MELANOMA OF CONJUNCTIVA, RIGHT (H): ICD-10-CM

## 2024-11-15 LAB
ALBUMIN SERPL BCG-MCNC: 4.2 G/DL (ref 3.5–5.2)
ALP SERPL-CCNC: 65 U/L (ref 40–150)
ALT SERPL W P-5'-P-CCNC: 47 U/L (ref 0–70)
ANION GAP SERPL CALCULATED.3IONS-SCNC: 12 MMOL/L (ref 7–15)
AST SERPL W P-5'-P-CCNC: 28 U/L (ref 0–45)
BASOPHILS # BLD AUTO: 0.1 10E3/UL (ref 0–0.2)
BASOPHILS NFR BLD AUTO: 1 %
BILIRUB SERPL-MCNC: 0.7 MG/DL
BUN SERPL-MCNC: 27.7 MG/DL (ref 8–23)
CALCIUM SERPL-MCNC: 11.5 MG/DL (ref 8.8–10.4)
CHLORIDE SERPL-SCNC: 104 MMOL/L (ref 98–107)
CREAT SERPL-MCNC: 2.19 MG/DL (ref 0.67–1.17)
EGFRCR SERPLBLD CKD-EPI 2021: 30 ML/MIN/1.73M2
EOSINOPHIL # BLD AUTO: 0.1 10E3/UL (ref 0–0.7)
EOSINOPHIL NFR BLD AUTO: 2 %
ERYTHROCYTE [DISTWIDTH] IN BLOOD BY AUTOMATED COUNT: 14.4 % (ref 10–15)
GLUCOSE SERPL-MCNC: 103 MG/DL (ref 70–99)
HCO3 SERPL-SCNC: 25 MMOL/L (ref 22–29)
HCT VFR BLD AUTO: 46.9 % (ref 40–53)
HGB BLD-MCNC: 16.6 G/DL (ref 13.3–17.7)
IMM GRANULOCYTES # BLD: 0 10E3/UL
IMM GRANULOCYTES NFR BLD: 0 %
LYMPHOCYTES # BLD AUTO: 1.9 10E3/UL (ref 0.8–5.3)
LYMPHOCYTES NFR BLD AUTO: 25 %
MCH RBC QN AUTO: 31.9 PG (ref 26.5–33)
MCHC RBC AUTO-ENTMCNC: 35.4 G/DL (ref 31.5–36.5)
MCV RBC AUTO: 90 FL (ref 78–100)
MONOCYTES # BLD AUTO: 0.7 10E3/UL (ref 0–1.3)
MONOCYTES NFR BLD AUTO: 10 %
NEUTROPHILS # BLD AUTO: 4.6 10E3/UL (ref 1.6–8.3)
NEUTROPHILS NFR BLD AUTO: 62 %
NRBC # BLD AUTO: 0 10E3/UL
NRBC BLD AUTO-RTO: 0 /100
PLATELET # BLD AUTO: 182 10E3/UL (ref 150–450)
POTASSIUM SERPL-SCNC: 3.9 MMOL/L (ref 3.4–5.3)
PROT SERPL-MCNC: 7.5 G/DL (ref 6.4–8.3)
RADIOLOGIST FLAGS: NORMAL
RBC # BLD AUTO: 5.2 10E6/UL (ref 4.4–5.9)
SODIUM SERPL-SCNC: 141 MMOL/L (ref 135–145)
WBC # BLD AUTO: 7.4 10E3/UL (ref 4–11)

## 2024-11-15 PROCEDURE — 70491 CT SOFT TISSUE NECK W/DYE: CPT | Performed by: RADIOLOGY

## 2024-11-15 PROCEDURE — 71260 CT THORAX DX C+: CPT | Performed by: STUDENT IN AN ORGANIZED HEALTH CARE EDUCATION/TRAINING PROGRAM

## 2024-11-15 PROCEDURE — 74177 CT ABD & PELVIS W/CONTRAST: CPT | Performed by: STUDENT IN AN ORGANIZED HEALTH CARE EDUCATION/TRAINING PROGRAM

## 2024-11-15 RX ORDER — IOPAMIDOL 755 MG/ML
149 INJECTION, SOLUTION INTRAVASCULAR ONCE
Status: COMPLETED | OUTPATIENT
Start: 2024-11-15 | End: 2024-11-15

## 2024-11-15 RX ADMIN — IOPAMIDOL 149 ML: 755 INJECTION, SOLUTION INTRAVASCULAR at 10:40

## 2024-11-15 NOTE — TELEPHONE ENCOUNTER
[facility] Call Center    Phone Message    May a detailed message be left on voicemail: yes     Reason for Call: Order(s): Other:   Reason for requested: [facility]   Date needed: ASAP  Provider name: Dr Gomez    Fax: [phone]      Action Taken: Message routed to:  [facility]    Travel Screening: Not Applicable     Date of Service:

## 2024-11-15 NOTE — TELEPHONE ENCOUNTER
I am gone for the day, can someone in surg scheduling please fax H & P form when you get a chance please?  Thank you,  Alia Gonzales RN RN 3:15 PM 11/15/24

## 2024-11-18 ENCOUNTER — PATIENT OUTREACH (OUTPATIENT)
Dept: ONCOLOGY | Facility: CLINIC | Age: 79
End: 2024-11-18
Payer: COMMERCIAL

## 2024-11-18 NOTE — PROGRESS NOTES
[facility]: Cancer Care                                                                                          Called patient to reschedule missed appt with Dr. Forte this morning.  Warm transfer to scheduling.    Bernadette INFANTE RN  Cancer Care Coordinator  [facility]

## 2024-11-19 ENCOUNTER — NURSE TRIAGE (OUTPATIENT)
Dept: ONCOLOGY | Facility: CLINIC | Age: 79
End: 2024-11-19
Payer: COMMERCIAL

## 2024-11-19 NOTE — TELEPHONE ENCOUNTER
Incidental Finding CT CAP on 11/15:    1. 9 mm hypodensity in the pancreatic tail, similar to more  conspicuous compared to prior, indeterminate, differential include  IPMN, focal fatty infiltration versus others/neoplastic. Recommend  pancreatic protocol MRI for further evaluation.     2. Similar sized indeterminate medial left renal exophytic lesion and  subcentimeter posterior left renal exophytic lesion, differential  include complex cyst versus renal solid neoplasm. These may also be  evaluated at the time of abdominal MRI.     3. Hepatic steatosis. Similar sized too small to characterize hepatic  hypodensity, this may also be further evaluated at the time of  abdominal MRI.      Message routed to Care Team.

## 2024-11-20 NOTE — PROGRESS NOTES
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  Patient presented with dryness, erythema, photophobia, and pain in the right eye not relieved by eye drops, artificial tears and ointment.  There was also a presumed conjunctival pyogenic granuloma in the right eye.  Patient underwent right eye conjunctival biopsy 12/05/2022, that revealed a ulcerated, nodular melanoma with a 4.1mm depth of invasion in the right lower eyelid that was positive for SOX10 and PRAME, but negative for AE1/AE3, CD45, ERG, and chromogranin.  Tumor extended to the lateral and deep margins.  There was no lymphovascular or perineural invasion.  There was no microsatellitosis. Tumor infiltrating lymphocytes were present and non-brisk without tumor regression. There were 13 mitoses/mm2.  MRI of brain and orbits 12/19/2022, was negative for intracranial metastases.  The orbits were unremarkable.  FDG PET/CT scan 01/02/2023, was negative for hypermetabolic lesions in the right lower eyelid.  There was no evidence of hypermetabolic lymphadenopathy or metastases.  There was a indeterminate 4 mm mildly enhancing focus in the right parotid gland with SUV max 4.  There was a 1.7 cm, non-FDG avid thyroid nodule. Patient underwent right conjunctiva wide excision and reconstruction with Amnioguard membrane, 02/13/2023, that revealed invasive conjunctival melanoma involving the inferior medial conjunctiva and tarsus.  Surgical margins were involved.  Subsequent wide local excision of the conjunctiva and right lower eyelid and right neck sentinel lymph node biopsy 04/05/2023, revealed residual melanoma in situ adjacent to scar from the prior procedure, contacting the lateral margin.  Additional margins were negative for malignancy.  One right neck sentinel lymph node was negative for  metastasis (0/1 lymph node involved).  There were small capsular Melan-A/Sox10-positive cells, consistent with maryam nevus.  Patient underwent right lower eyelid full-thickness reconstruction with tarsoconjunctival flap and myocutaneous advancement flap 04/19/2023, second stage right lower eyelid reconstruction with Early flap, 06/28/2023, and right lower eyelid ectropion repair with lateral tarsal strip and excision of the right upper eyelid seborrheic keratosis, 11/22/2023.  Patient has right eye irritation due to right lower eyelid ectropion, which will be repaired 11/27/2024.  Patient has arthritis discomfort in the hips and knees but otherwise feels well.  There are no other new symptoms or events since the prior clinic visit (05/16/2024). He denies fever, anorexia, weight loss, headache, visual changes, cough, dyspnea, chest pain, abdominal pain, nausea, vomiting, constipation, diarrhea, bleeding, or bone pain.     PAST HISTORY:   -Hypertension.  -Hyperlipidemia.  -Obstructive sleep apnea.  -COPD.  -Stage 3 chronic kidney disease.  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  -History of melanoma of the right gluteus status post wide local excision, 1970s.  -Excisional biopsy for squamous cell skin cancer, 1969  -Glaucoma status post laser trabeculoplasty, right eye.  -Bilateral sensorineural hearing loss.  -Deviated nasal septum.  -Colonoscopy 02/2015, was negative for polyps.  -Nephrolithiasis  -Gout.  -Osteoarthritis involving the hands, hips, and knees.  -History of depression.  -Posttraumatic stress disorder.  -Ectropion repair, right lower eyelid, 11/22/2023, 11/27/2024.  -Cataract extraction, intraocular lens implant, right eye 01/12/2024; left eye 01/26/2024.  -Right medial and lateral right lower eyelid reconstruction, 04/19/2023, 06/20/2023, 11/22/2023.  -Nephrolithiasis status post cystoscopy, left ureteroscopy, Holmium laser lithotripsy, 03/21/2022.  -BPH  status post TURP, 2005.  -Right ureteral stricture status post cystoscopy, right retrograde ureterogram, ureteroscopy, ureteral dilation, and stent placement, 04/23/2015.   -Appendectomy, 2000.  -Cholecystectomy, 1999.  -Tonsillectomy, 1951.    MEDICATIONS:   Current Outpatient Medications   Medication Sig Dispense Refill    acetaminophen (TYLENOL) 325 MG tablet Take 2 tablets (650 mg) by mouth every 4 hours as needed for mild pain 50 tablet 0    albuterol (PROAIR HFA) 108 (90 Base) MCG/ACT inhaler Inhale into the lungs as needed.      allopurinol (ZYLOPRIM) 100 MG tablet Take 100 mg by mouth daily.      atenolol (TENORMIN) 25 MG tablet Take 25 mg by mouth daily Once daily      buPROPion (WELLBUTRIN) 75 MG tablet Once daily      cholecalciferol 50 MCG (2000 UT) tablet TAKE ONE TABLET BY MOUTH DAILY FOR VITAMIN D SUPPLEMENT TAKE WITH EVENING MEAL.      DULoxetine (CYMBALTA) 60 MG capsule TAKE ONE CAPSULE BY MOUTH EVERY MORNING FOR PAIN/DEPRESSION.      erythromycin (ROMYCIN) 5 MG/GM ophthalmic ointment Apply 1 strip to eye as needed.      febuxostat (ULORIC) 80 MG TABS tablet TAKE ONE TABLET BY MOUTH EVERY MORNING FOR GOUT      flunisolide (NASALIDE) 25 MCG/ACT (0.025%) SOLN spray Spray 2 sprays in nostril as needed.      fluticasone (FLONASE) 50 MCG/ACT nasal spray Spray 1 spray into both nostrils as needed.      ketoconazole (NIZORAL) 2 % external shampoo Apply topically three times a week. Lather in the shower, wait 3-5 minutes before rinsing. 120 mL 11    loratadine (CLARITIN) 10 MG tablet Take 10 mg by mouth as needed.      melatonin 3 MG tablet Take 3-6 mg by mouth nightly as needed for sleep      methocarbamol (ROBAXIN) 750 MG tablet as needed.      Multiple Vitamin (THERA-TABS) TABS Take 1 tablet by mouth      pravastatin (PRAVACHOL) 40 MG tablet TAKE ONE TABLET BY MOUTH DAILY FOR CHOLESTEROL      prednisoLONE acetate (PRED FORTE) 1 % ophthalmic suspension Place 1 drop into the right eye 4 times daily       predniSONE (DELTASONE) 20 MG tablet 40 mg      tamsulosin (FLOMAX) 0.4 MG capsule TAKE TWO CAPSULES BY MOUTH ONCE A DAY FOR PROSTATE      traZODone (DESYREL) 100 MG tablet Take 1 tablet by mouth as needed.      triamterene-hydrochlorothiazide (MAXZIDE) 75-50 MG per tablet Take 1 tablet by mouth daily Once daily      aspirin (ASA) 81 MG chewable tablet Take 81 mg by mouth daily (Patient not taking: Reported on 5/16/2024)      brimonidine (ALPHAGAN-P) 0.15 % ophthalmic solution 1 drop daily (Patient not taking: Reported on 11/21/2024)      buPROPion (WELLBUTRIN) 75 MG tablet  (Patient not taking: Reported on 11/21/2024)      celecoxib (CELEBREX) 100 MG capsule Take 100 mg by mouth 2 times daily (Patient not taking: Reported on 11/21/2024)      cycloSPORINE (RESTASIS) 0.05 % ophthalmic emulsion Place 1 drop into both eyes 2 times daily (Patient not taking: Reported on 11/21/2024)      dexamethasone (DECADRON) 0.1 % ophthalmic suspension Apply 1 drop to eye (Patient not taking: Reported on 11/21/2024)      erythromycin (ROMYCIN) 5 MG/GM ophthalmic ointment Apply antibiotic ointment to all sutures three times a day, and 1/2 inch strip into the operated eye(s) at night. Use until follow up. (Patient not taking: Reported on 11/21/2024) 3.5 g 1    erythromycin (ROMYCIN) 5 MG/GM ophthalmic ointment Apply small amount to incision sites three times daily, then apply to inner lower lid of operative eye(s) at bedtime, as directed. (Patient not taking: Reported on 11/21/2024) 3.5 g 0    erythromycin (ROMYCIN) 5 MG/GM ophthalmic ointment Apply small amount to incision sites three times daily, then apply to inner lower lid of operative eye(s) at bedtime, as directed. (Patient not taking: Reported on 11/21/2024) 3.5 g 0    ketorolac (ACULAR) 0.5 % ophthalmic solution Apply 1 drop to eye (Patient not taking: Reported on 11/21/2024)      neomycin-polymixin-dexAMETHasone (MAXITROL) 0.1 % ophthalmic suspension Please instill into the  "operative eye(s) four times daily until the bottle is finished. (Patient not taking: Reported on 11/21/2024) 5 mL 0    neomycin-polymixin-dexamethasone (MAXITROL) 0.1 % ophthalmic suspension Place 1 drop into the right eye 4 times daily (Patient not taking: Reported on 11/21/2024) 3 mL 0    polyethylene glycol-propylene glycol (SYSTANE ULTRA) 0.4-0.3 % SOLN ophthalmic solution INSTILL 1 DROP IN RIGHT EYE FOUR TIMES A DAY (Patient not taking: Reported on 11/21/2024)      semaglutide (OZEMPIC) 2 MG/3ML pen Inject subcutaneously every 7 days. (Patient not taking: Reported on 11/21/2024)      vitamin C (ASCORBIC ACID) 500 MG tablet Take 500 mg by mouth daily      Witch Hazel (MEDICATED WIPES) 50 % PADS by topical route every 2 hours as needed. (Patient not taking: Reported on 11/21/2024)         REVIEW OF SYSTEMS: Review of systems reviewed with the patient and otherwise negative except for those detailed above.    PHYSICAL EXAM: /80 (BP Location: Right arm, Patient Position: Sitting, Cuff Size: Adult Large)   Pulse 73   Temp 98.2  F (36.8  C) (Oral)   Resp 16   Ht 1.738 m (5' 8.43\")   Wt 142 kg (313 lb)   SpO2 94%   BMI 47.00 kg/m  .  Physical exam was unchanged from prior clinic visit 05/16/2024.  Skin: No erythema or rash.  HEENT: Sclera nonicteric. Oropharynx without lesions or ulceration, mucosa pink and moist.  Nodes: No cervical, supraclavicular, axillary, or inguinal adenopathy.  Lungs: No dullness to percussion.  No rales, wheezes, rhonchi.  Heart: Regular rate and rhythm.  Abdomen: Bowel sounds present.  Soft, nontender, no hepatosplenomegaly or mass.  Extremities: Trace ankle and pedal edema.      LABS REVIEWED:   Component      Latest Ref Rng 11/15/2024  9:34 AM   WBC      4.0 - 11.0 10e3/uL 7.4    RBC Count      4.40 - 5.90 10e6/uL 5.20    Hemoglobin      13.3 - 17.7 g/dL 16.6    Hematocrit      40.0 - 53.0 % 46.9    MCV      78 - 100 fL 90    MCH      26.5 - 33.0 pg 31.9    MCHC      31.5 - " 36.5 g/dL 35.4    RDW      10.0 - 15.0 % 14.4    Platelet Count      150 - 450 10e3/uL 182    % Neutrophils      % 62    % Lymphocytes      % 25    % Monocytes      % 10    % Eosinophils      % 2    % Basophils      % 1    % Immature Granulocytes      % 0    NRBCs per 100 WBC      <1 /100 0    Absolute Neutrophils      1.6 - 8.3 10e3/uL 4.6    Absolute Lymphocytes      0.8 - 5.3 10e3/uL 1.9    Absolute Monocytes      0.0 - 1.3 10e3/uL 0.7    Absolute Eosinophils      0.0 - 0.7 10e3/uL 0.1    Absolute Basophils      0.0 - 0.2 10e3/uL 0.1    Absolute Immature Granulocytes      <=0.4 10e3/uL 0.0    Absolute NRBCs      10e3/uL 0.0    Sodium      135 - 145 mmol/L 141    Potassium      3.4 - 5.3 mmol/L 3.9    Carbon Dioxide (CO2)      22 - 29 mmol/L 25    Anion Gap      7 - 15 mmol/L 12    Urea Nitrogen      8.0 - 23.0 mg/dL 27.7 (H)    Creatinine      0.67 - 1.17 mg/dL 2.19 (H)    GFR Estimate      >60 mL/min/1.73m2 30 (L)    Calcium      8.8 - 10.4 mg/dL 11.5 (H)    Chloride      98 - 107 mmol/L 104    Glucose      70 - 99 mg/dL 103 (H)    Alkaline Phosphatase      40 - 150 U/L 65    AST      0 - 45 U/L 28    ALT      0 - 70 U/L 47    Protein Total      6.4 - 8.3 g/dL 7.5    Albumin      3.5 - 5.2 g/dL 4.2    Bilirubin Total      <=1.2 mg/dL 0.7        IMAGING REVIEWED: CT neck, chest, abdomen, pelvis 11/15/2024, was negative for adenopathy or metastases.    Recent Results (from the past 744 hours)   CT Soft Tissue Neck w Contrast    Narrative    CT SOFT TISSUE NECK W CONTRAST 11/15/2024 11:00 AM    History:  Palpebral conjunctival melanoma of the right eye.  Requests  restaging CT neck, chest, abdomen, pelvis to evaluate for recurrence  or metastases.; Malignant melanoma of conjunctiva, right (H)  ICD-10: Malignant melanoma of conjunctiva, right (H)      Comparison:  5/6/2024     Technique: Following intravenous administration of nonionic iodinated  contrast medium, thin section helical CT images were obtained from  the  skull base down to the level of the aortic arch.  Axial, coronal and  sagittal reformations were performed with 2-3 mm slice thickness  reconstruction. Images were reviewed in soft tissue, lung and bone  windows.    Contrast: 149 ml isovue 370    Findings:   Evaluation of the mucosal space demonstrates no evident abnormality in  the nasopharynx, oropharynx, hypopharynx or the glottis. The tongue  base appears normal. The major salivary glands appear unremarkable.  The thyroid gland appears normal.    There is no evident cervical lymphadenopathy. Scattered subcentimeter  cervical lymph nodes that are unchanged from the prior study including  subcentimeter intraparotid lymph nodes. The fascial spaces in the neck  are intact bilaterally. The major vascular structures in the neck  appear unremarkable.    Evaluation of the osseous structures demonstrate no worrisome lytic or  sclerotic lesion. No overt spinal canal or neuroforaminal stenosis.  The visualized paranasal sinuses are clear. The mastoid air cells are  clear. The orbits appear unremarkable.    The visualized lung apices are clear.      Impression    Impression:  No evident mass or adenopathy within the neck.        PAOLA FAIRCHILD MD         SYSTEM ID:  [id number]       CT Chest/Abdomen/Pelvis w Contrast   Result Value    Radiologist flags Pancreatic hypodensity, indeterminate exophytic    Narrative    EXAMINATION: CT CHEST/ABDOMEN/PELVIS W CONTRAST, 11/15/2024 11:00 AM    TECHNIQUE:  Helical CT images from the lung apices through the  symphysis pubis were obtained with contrast.  Coronal and sagittal  reformatted images were generated at a workstation for further  assessment.    CONTRAST:  149  ml Isovue 370.    COMPARISON: 5/6/2024    HISTORY: Palpebral conjunctival melanoma of the right eye. Requests  restaging CT neck, chest, abdomen, pelvis to evaluate for recurrence  or metastases.; Malignant melanoma of conjunctiva, right (H)    FINDINGS:    Chest:  Similar left lobe thyroid nodule, measuring up to 1.5 cm.  (9/14). Similar subcentimeter left paratracheal lymph node. Similar  subcentimeter axillary nodes. No new bulky thoracic adenopathy.  Decompressed esophagus, similar mild thickening along the  gastroesophageal junction. No pericardial effusion. No pleural  effusion. Similar subcentimeter bilateral hilar lymph node.    Similar osteoporotic calcification. Subcentimeter pulmonary nodules,  similar to prior. Mild increased posterior basilar subpleural  groundglass density, right more than left (12/87).    Abdomen and pelvis:    Diffuse hepatic hypoattenuation compatible with hepatic steatosis;  decreases sensitivity for small liver lesions, similar to this marker  device peripheral hypodensity in segment 5 measuring 6 mm (8/68). No  new overt liver lesion. Gallbladder is surgically absent. No biliary  ductal dilatation.    No pancreatic duct dilatation. Subcentimeter hypodensity in the  pancreatic tail measuring 8 mm (9/153); additional fat attenuation  lesion in the pancreatic tail favor focal fatty infiltration/lipoma  measuring 1 cm (7/69); borderline splenomegaly, similar to prior to  the spleen measuring 13 cm in anteroposterior dimension.    Symmetric enhancement of the kidneys with multiple right renal  hypodensities, some showing fluid attenuation compatible with cyst and  others too small to characterize, no new right renal mass or  hydronephrosis. Similar sized exophytic indeterminate density lesion  along the medial left kidney measuring 2.5 cm (9/157). Additional  subcentimeter density is seen along the posterior left kidney with  indeterminate density (9/166)      Mild prostatomegaly. Decompressed urinary bladder. Mild apparent  thickening of the urinary bladder possibly secondary to  underdistention.     Similar nodular thickening of bilateral adrenal glands.. Similar  fat-containing left para-aortic lymph nodes. Similar subcentimeter  external  iliac lymph nodes. No new bulky retroperitoneal  adenopathy.Patent abdominal aorta without aneurysmal dilatation. Few  subcentimeter inguinal lymph nodes, possibly reactive.    Partially distended stomach. No high-grade small bowel obstruction or  evidence for appendicitis. Colonic diverticulosis without evidence for  diverticulitis.    Part of the anterior abdominal wall is not completely included in the  reconstructed volume on axial images.    Similar punctate sclerotic focus in the right ischium enthesopathic  changes in the pelvis. No new aggressive osseous lesion. Similar  sclerotic focus in the sternum. Multilevel degenerative changes of the  spine, similar to prior      Impression    IMPRESSION: Compared to prior CT from 5/6/2024, the current scan  shows:    1. 9 mm hypodensity in the pancreatic tail, similar to more  conspicuous compared to prior, indeterminate, differential include  IPMN, focal fatty infiltration versus others/neoplastic. Recommend  pancreatic protocol MRI for further evaluation.    2. Similar sized indeterminate medial left renal exophytic lesion and  subcentimeter posterior left renal exophytic lesion, differential  include complex cyst versus renal solid neoplasm. These may also be  evaluated at the time of abdominal MRI.    3. Hepatic steatosis. Similar sized too small to characterize hepatic  hypodensity, this may also be further evaluated at the time of  abdominal MRI.    4. Similar sized left thyroid nodule measuring up to 1.5 cm. Consider  ultrasound thyroid for further evaluation    5. Additional findings are similar to prior as described above  including subcentimeter pulmonary nodules, nodular adrenal gland  thickening, consider attention on follow-up.    6. Mild prostatomegaly. Mild apparent thickening of the urinary  bladder possibly secondary to underdistention, consider correlation  for chronic obstructive outflow changes and/or urinalysis to evaluate  for cystitis, if  clinically desired    [Consider Follow Up: Pancreatic hypodensity, indeterminate exophytic  left renal lesions]    This report will be copied to the [facility] to ensure a  provider acknowledges the finding. [facility] is available Monday  through Friday 8am-3:30 pm.    BERNADETTE ALTAMIRANO MD         SYSTEM ID:  [id number]       IMPRESSION/PLAN: Conjunctival melanoma and the right eye.  Melanoma is an ulcerated, 4.1 mm depth of invasion nodular melanoma in the right lower eyelid without lymphovascular or perineural invasion involving the inferior medial conjunctiva and tarsus.  Patient underwent definitive wide local excision of the conjunctiva and right lower eyelid achieving negative surgical margins, and 1 right neck sentinel lymph node was negative for metastasis (04/05/2023).  There is no proven benefit for systemic adjuvant therapy, although there are case series for immune checkpoint inhibitor for locally advanced disease if enucleation is not feasible or desired, or for metastatic disease.  Patient will continue close surveillance including clinical evaluation in medical oncology and ophthalmology clinic.  Surveillance consists of clinical evaluation every 3 months for year 1, every 6 months for years 2-3, and annually for years 4-5 and CT neck, chest, abdomen, pelvis every 6 months for years 1-2 and annually for years 3-5. Patient will return to clinic in 6 months with CBC, metabolic panel, and CT neck, chest, abdomen, pelvis. The current and past history, clinical evaluation, reviewing diagnostic tests and viewing images with the patient, and assessment and planning occurred over 30 minutes.       Dong Forte MD    cc: MARITZA Baugh CNP, MD

## 2024-11-21 ENCOUNTER — ONCOLOGY VISIT (OUTPATIENT)
Dept: ONCOLOGY | Facility: CLINIC | Age: 79
End: 2024-11-21
Attending: INTERNAL MEDICINE
Payer: COMMERCIAL

## 2024-11-21 VITALS
HEART RATE: 73 BPM | WEIGHT: 313 LBS | SYSTOLIC BLOOD PRESSURE: 127 MMHG | BODY MASS INDEX: 47.44 KG/M2 | DIASTOLIC BLOOD PRESSURE: 80 MMHG | OXYGEN SATURATION: 94 % | TEMPERATURE: 98.2 F | RESPIRATION RATE: 16 BRPM | HEIGHT: 68 IN

## 2024-11-21 DIAGNOSIS — C69.01 MALIGNANT MELANOMA OF CONJUNCTIVA, RIGHT (H): Primary | ICD-10-CM

## 2024-11-21 PROCEDURE — G0463 HOSPITAL OUTPT CLINIC VISIT: HCPCS | Performed by: INTERNAL MEDICINE

## 2024-11-21 ASSESSMENT — PAIN SCALES - GENERAL: PAINLEVEL_OUTOF10: SEVERE PAIN (7)

## 2024-11-21 NOTE — LETTER
11/21/2024      Arthur Garcia  [address] [address] [place] [id number]      Dear Colleague,    Thank you for referring your patient, Arthur Garcia, to the [facility]. Please see a copy of my visit note below.      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  Patient presented with dryness, erythema, photophobia, and pain in the right eye not relieved by eye drops, artificial tears and ointment.  There was also a presumed conjunctival pyogenic granuloma in the right eye.  Patient underwent right eye conjunctival biopsy 12/05/2022, that revealed a ulcerated, nodular melanoma with a 4.1mm depth of invasion in the right lower eyelid that was positive for SOX10 and PRAME, but negative for AE1/AE3, CD45, ERG, and chromogranin.  Tumor extended to the lateral and deep margins.  There was no lymphovascular or perineural invasion.  There was no microsatellitosis. Tumor infiltrating lymphocytes were present and non-brisk without tumor regression. There were 13 mitoses/mm2.  MRI of brain and orbits 12/19/2022, was negative for intracranial metastases.  The orbits were unremarkable.  FDG PET/CT scan 01/02/2023, was negative for hypermetabolic lesions in the right lower eyelid.  There was no evidence of hypermetabolic lymphadenopathy or metastases.  There was a indeterminate 4 mm mildly enhancing focus in the right parotid gland with SUV max 4.  There was a 1.7 cm, non-FDG avid thyroid nodule. Patient underwent right conjunctiva wide excision and reconstruction with Amnioguard membrane, 02/13/2023, that revealed invasive conjunctival melanoma involving the inferior medial conjunctiva and tarsus.  Surgical margins were involved.  Subsequent wide local excision of the conjunctiva and right lower eyelid and right neck sentinel lymph  node biopsy 04/05/2023, revealed residual melanoma in situ adjacent to scar from the prior procedure, contacting the lateral margin.  Additional margins were negative for malignancy.  One right neck sentinel lymph node was negative for metastasis (0/1 lymph node involved).  There were small capsular Melan-A/Sox10-positive cells, consistent with maryam nevus.  Patient underwent right lower eyelid full-thickness reconstruction with tarsoconjunctival flap and myocutaneous advancement flap 04/19/2023, second stage right lower eyelid reconstruction with Early flap, 06/28/2023, and right lower eyelid ectropion repair with lateral tarsal strip and excision of the right upper eyelid seborrheic keratosis, 11/22/2023.  Patient has right eye irritation due to right lower eyelid ectropion, which will be repaired 11/27/2024.  Patient has arthritis discomfort in the hips and knees but otherwise feels well.  There are no other new symptoms or events since the prior clinic visit (05/16/2024). He denies fever, anorexia, weight loss, headache, visual changes, cough, dyspnea, chest pain, abdominal pain, nausea, vomiting, constipation, diarrhea, bleeding, or bone pain.     PAST HISTORY:   -Hypertension.  -Hyperlipidemia.  -Obstructive sleep apnea.  -COPD.  -Stage 3 chronic kidney disease.  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  -History of melanoma of the right gluteus status post wide local excision, 1970s.  -Excisional biopsy for squamous cell skin cancer, 1969  -Glaucoma status post laser trabeculoplasty, right eye.  -Bilateral sensorineural hearing loss.  -Deviated nasal septum.  -Colonoscopy 02/2015, was negative for polyps.  -Nephrolithiasis  -Gout.  -Osteoarthritis involving the hands, hips, and knees.  -History of depression.  -Posttraumatic stress disorder.  -Ectropion repair, right lower eyelid, 11/22/2023, 11/27/2024.  -Cataract extraction, intraocular lens implant, right eye  01/12/2024; left eye 01/26/2024.  -Right medial and lateral right lower eyelid reconstruction, 04/19/2023, 06/20/2023, 11/22/2023.  -Nephrolithiasis status post cystoscopy, left ureteroscopy, Holmium laser lithotripsy, 03/21/2022.  -BPH status post TURP, 2005.  -Right ureteral stricture status post cystoscopy, right retrograde ureterogram, ureteroscopy, ureteral dilation, and stent placement, 04/23/2015.   -Appendectomy, 2000.  -Cholecystectomy, 1999.  -Tonsillectomy, 1951.    MEDICATIONS:   Current Outpatient Medications   Medication Sig Dispense Refill     acetaminophen (TYLENOL) 325 MG tablet Take 2 tablets (650 mg) by mouth every 4 hours as needed for mild pain 50 tablet 0     albuterol (PROAIR HFA) 108 (90 Base) MCG/ACT inhaler Inhale into the lungs as needed.       allopurinol (ZYLOPRIM) 100 MG tablet Take 100 mg by mouth daily.       atenolol (TENORMIN) 25 MG tablet Take 25 mg by mouth daily Once daily       buPROPion (WELLBUTRIN) 75 MG tablet Once daily       cholecalciferol 50 MCG (2000 UT) tablet TAKE ONE TABLET BY MOUTH DAILY FOR VITAMIN D SUPPLEMENT TAKE WITH EVENING MEAL.       DULoxetine (CYMBALTA) 60 MG capsule TAKE ONE CAPSULE BY MOUTH EVERY MORNING FOR PAIN/DEPRESSION.       erythromycin (ROMYCIN) 5 MG/GM ophthalmic ointment Apply 1 strip to eye as needed.       febuxostat (ULORIC) 80 MG TABS tablet TAKE ONE TABLET BY MOUTH EVERY MORNING FOR GOUT       flunisolide (NASALIDE) 25 MCG/ACT (0.025%) SOLN spray Spray 2 sprays in nostril as needed.       fluticasone (FLONASE) 50 MCG/ACT nasal spray Spray 1 spray into both nostrils as needed.       ketoconazole (NIZORAL) 2 % external shampoo Apply topically three times a week. Lather in the shower, wait 3-5 minutes before rinsing. 120 mL 11     loratadine (CLARITIN) 10 MG tablet Take 10 mg by mouth as needed.       melatonin 3 MG tablet Take 3-6 mg by mouth nightly as needed for sleep       methocarbamol (ROBAXIN) 750 MG tablet as needed.       Multiple  Vitamin (THERA-TABS) TABS Take 1 tablet by mouth       pravastatin (PRAVACHOL) 40 MG tablet TAKE ONE TABLET BY MOUTH DAILY FOR CHOLESTEROL       prednisoLONE acetate (PRED FORTE) 1 % ophthalmic suspension Place 1 drop into the right eye 4 times daily       predniSONE (DELTASONE) 20 MG tablet 40 mg       tamsulosin (FLOMAX) 0.4 MG capsule TAKE TWO CAPSULES BY MOUTH ONCE A DAY FOR PROSTATE       traZODone (DESYREL) 100 MG tablet Take 1 tablet by mouth as needed.       triamterene-hydrochlorothiazide (MAXZIDE) 75-50 MG per tablet Take 1 tablet by mouth daily Once daily       aspirin (ASA) 81 MG chewable tablet Take 81 mg by mouth daily (Patient not taking: Reported on 5/16/2024)       brimonidine (ALPHAGAN-P) 0.15 % ophthalmic solution 1 drop daily (Patient not taking: Reported on 11/21/2024)       buPROPion (WELLBUTRIN) 75 MG tablet  (Patient not taking: Reported on 11/21/2024)       celecoxib (CELEBREX) 100 MG capsule Take 100 mg by mouth 2 times daily (Patient not taking: Reported on 11/21/2024)       cycloSPORINE (RESTASIS) 0.05 % ophthalmic emulsion Place 1 drop into both eyes 2 times daily (Patient not taking: Reported on 11/21/2024)       dexamethasone (DECADRON) 0.1 % ophthalmic suspension Apply 1 drop to eye (Patient not taking: Reported on 11/21/2024)       erythromycin (ROMYCIN) 5 MG/GM ophthalmic ointment Apply antibiotic ointment to all sutures three times a day, and 1/2 inch strip into the operated eye(s) at night. Use until follow up. (Patient not taking: Reported on 11/21/2024) 3.5 g 1     erythromycin (ROMYCIN) 5 MG/GM ophthalmic ointment Apply small amount to incision sites three times daily, then apply to inner lower lid of operative eye(s) at bedtime, as directed. (Patient not taking: Reported on 11/21/2024) 3.5 g 0     erythromycin (ROMYCIN) 5 MG/GM ophthalmic ointment Apply small amount to incision sites three times daily, then apply to inner lower lid of operative eye(s) at bedtime, as directed.  "(Patient not taking: Reported on 11/21/2024) 3.5 g 0     ketorolac (ACULAR) 0.5 % ophthalmic solution Apply 1 drop to eye (Patient not taking: Reported on 11/21/2024)       neomycin-polymixin-dexAMETHasone (MAXITROL) 0.1 % ophthalmic suspension Please instill into the operative eye(s) four times daily until the bottle is finished. (Patient not taking: Reported on 11/21/2024) 5 mL 0     neomycin-polymixin-dexamethasone (MAXITROL) 0.1 % ophthalmic suspension Place 1 drop into the right eye 4 times daily (Patient not taking: Reported on 11/21/2024) 3 mL 0     polyethylene glycol-propylene glycol (SYSTANE ULTRA) 0.4-0.3 % SOLN ophthalmic solution INSTILL 1 DROP IN RIGHT EYE FOUR TIMES A DAY (Patient not taking: Reported on 11/21/2024)       semaglutide (OZEMPIC) 2 MG/3ML pen Inject subcutaneously every 7 days. (Patient not taking: Reported on 11/21/2024)       vitamin C (ASCORBIC ACID) 500 MG tablet Take 500 mg by mouth daily       Witch Hazel (MEDICATED WIPES) 50 % PADS by topical route every 2 hours as needed. (Patient not taking: Reported on 11/21/2024)         REVIEW OF SYSTEMS: Review of systems reviewed with the patient and otherwise negative except for those detailed above.    PHYSICAL EXAM: /80 (BP Location: Right arm, Patient Position: Sitting, Cuff Size: Adult Large)   Pulse 73   Temp 98.2  F (36.8  C) (Oral)   Resp 16   Ht 1.738 m (5' 8.43\")   Wt 142 kg (313 lb)   SpO2 94%   BMI 47.00 kg/m  .  Physical exam was unchanged from prior clinic visit 05/16/2024.  Skin: No erythema or rash.  HEENT: Sclera nonicteric. Oropharynx without lesions or ulceration, mucosa pink and moist.  Nodes: No cervical, supraclavicular, axillary, or inguinal adenopathy.  Lungs: No dullness to percussion.  No rales, wheezes, rhonchi.  Heart: Regular rate and rhythm.  Abdomen: Bowel sounds present.  Soft, nontender, no hepatosplenomegaly or mass.  Extremities: Trace ankle and pedal edema.      LABS REVIEWED:   Component     "  Latest Ref Rng 11/15/2024  9:34 AM   WBC      4.0 - 11.0 10e3/uL 7.4    RBC Count      4.40 - 5.90 10e6/uL 5.20    Hemoglobin      13.3 - 17.7 g/dL 16.6    Hematocrit      40.0 - 53.0 % 46.9    MCV      78 - 100 fL 90    MCH      26.5 - 33.0 pg 31.9    MCHC      31.5 - 36.5 g/dL 35.4    RDW      10.0 - 15.0 % 14.4    Platelet Count      150 - 450 10e3/uL 182    % Neutrophils      % 62    % Lymphocytes      % 25    % Monocytes      % 10    % Eosinophils      % 2    % Basophils      % 1    % Immature Granulocytes      % 0    NRBCs per 100 WBC      <1 /100 0    Absolute Neutrophils      1.6 - 8.3 10e3/uL 4.6    Absolute Lymphocytes      0.8 - 5.3 10e3/uL 1.9    Absolute Monocytes      0.0 - 1.3 10e3/uL 0.7    Absolute Eosinophils      0.0 - 0.7 10e3/uL 0.1    Absolute Basophils      0.0 - 0.2 10e3/uL 0.1    Absolute Immature Granulocytes      <=0.4 10e3/uL 0.0    Absolute NRBCs      10e3/uL 0.0    Sodium      135 - 145 mmol/L 141    Potassium      3.4 - 5.3 mmol/L 3.9    Carbon Dioxide (CO2)      22 - 29 mmol/L 25    Anion Gap      7 - 15 mmol/L 12    Urea Nitrogen      8.0 - 23.0 mg/dL 27.7 (H)    Creatinine      0.67 - 1.17 mg/dL 2.19 (H)    GFR Estimate      >60 mL/min/1.73m2 30 (L)    Calcium      8.8 - 10.4 mg/dL 11.5 (H)    Chloride      98 - 107 mmol/L 104    Glucose      70 - 99 mg/dL 103 (H)    Alkaline Phosphatase      40 - 150 U/L 65    AST      0 - 45 U/L 28    ALT      0 - 70 U/L 47    Protein Total      6.4 - 8.3 g/dL 7.5    Albumin      3.5 - 5.2 g/dL 4.2    Bilirubin Total      <=1.2 mg/dL 0.7        IMAGING REVIEWED: CT neck, chest, abdomen, pelvis 11/15/2024, was negative for adenopathy or metastases.    Recent Results (from the past 744 hours)   CT Soft Tissue Neck w Contrast    Narrative    CT SOFT TISSUE NECK W CONTRAST 11/15/2024 11:00 AM    History:  Palpebral conjunctival melanoma of the right eye.  Requests  restaging CT neck, chest, abdomen, pelvis to evaluate for recurrence  or metastases.;  Malignant melanoma of conjunctiva, right (H)  ICD-10: Malignant melanoma of conjunctiva, right (H)      Comparison:  5/6/2024     Technique: Following intravenous administration of nonionic iodinated  contrast medium, thin section helical CT images were obtained from the  skull base down to the level of the aortic arch.  Axial, coronal and  sagittal reformations were performed with 2-3 mm slice thickness  reconstruction. Images were reviewed in soft tissue, lung and bone  windows.    Contrast: 149 ml isovue 370    Findings:   Evaluation of the mucosal space demonstrates no evident abnormality in  the nasopharynx, oropharynx, hypopharynx or the glottis. The tongue  base appears normal. The major salivary glands appear unremarkable.  The thyroid gland appears normal.    There is no evident cervical lymphadenopathy. Scattered subcentimeter  cervical lymph nodes that are unchanged from the prior study including  subcentimeter intraparotid lymph nodes. The fascial spaces in the neck  are intact bilaterally. The major vascular structures in the neck  appear unremarkable.    Evaluation of the osseous structures demonstrate no worrisome lytic or  sclerotic lesion. No overt spinal canal or neuroforaminal stenosis.  The visualized paranasal sinuses are clear. The mastoid air cells are  clear. The orbits appear unremarkable.    The visualized lung apices are clear.      Impression    Impression:  No evident mass or adenopathy within the neck.        PAOLA FAIRCHILD MD         SYSTEM ID:  [id number]       CT Chest/Abdomen/Pelvis w Contrast   Result Value    Radiologist flags Pancreatic hypodensity, indeterminate exophytic    Narrative    EXAMINATION: CT CHEST/ABDOMEN/PELVIS W CONTRAST, 11/15/2024 11:00 AM    TECHNIQUE:  Helical CT images from the lung apices through the  symphysis pubis were obtained with contrast.  Coronal and sagittal  reformatted images were generated at a workstation for further  assessment.    CONTRAST:  149   ml Isovue 370.    COMPARISON: 5/6/2024    HISTORY: Palpebral conjunctival melanoma of the right eye. Requests  restaging CT neck, chest, abdomen, pelvis to evaluate for recurrence  or metastases.; Malignant melanoma of conjunctiva, right (H)    FINDINGS:    Chest: Similar left lobe thyroid nodule, measuring up to 1.5 cm.  (9/14). Similar subcentimeter left paratracheal lymph node. Similar  subcentimeter axillary nodes. No new bulky thoracic adenopathy.  Decompressed esophagus, similar mild thickening along the  gastroesophageal junction. No pericardial effusion. No pleural  effusion. Similar subcentimeter bilateral hilar lymph node.    Similar osteoporotic calcification. Subcentimeter pulmonary nodules,  similar to prior. Mild increased posterior basilar subpleural  groundglass density, right more than left (12/87).    Abdomen and pelvis:    Diffuse hepatic hypoattenuation compatible with hepatic steatosis;  decreases sensitivity for small liver lesions, similar to this marker  device peripheral hypodensity in segment 5 measuring 6 mm (8/68). No  new overt liver lesion. Gallbladder is surgically absent. No biliary  ductal dilatation.    No pancreatic duct dilatation. Subcentimeter hypodensity in the  pancreatic tail measuring 8 mm (9/153); additional fat attenuation  lesion in the pancreatic tail favor focal fatty infiltration/lipoma  measuring 1 cm (7/69); borderline splenomegaly, similar to prior to  the spleen measuring 13 cm in anteroposterior dimension.    Symmetric enhancement of the kidneys with multiple right renal  hypodensities, some showing fluid attenuation compatible with cyst and  others too small to characterize, no new right renal mass or  hydronephrosis. Similar sized exophytic indeterminate density lesion  along the medial left kidney measuring 2.5 cm (9/157). Additional  subcentimeter density is seen along the posterior left kidney with  indeterminate density (9/166)      Mild prostatomegaly.  Decompressed urinary bladder. Mild apparent  thickening of the urinary bladder possibly secondary to  underdistention.     Similar nodular thickening of bilateral adrenal glands.. Similar  fat-containing left para-aortic lymph nodes. Similar subcentimeter  external iliac lymph nodes. No new bulky retroperitoneal  adenopathy.Patent abdominal aorta without aneurysmal dilatation. Few  subcentimeter inguinal lymph nodes, possibly reactive.    Partially distended stomach. No high-grade small bowel obstruction or  evidence for appendicitis. Colonic diverticulosis without evidence for  diverticulitis.    Part of the anterior abdominal wall is not completely included in the  reconstructed volume on axial images.    Similar punctate sclerotic focus in the right ischium enthesopathic  changes in the pelvis. No new aggressive osseous lesion. Similar  sclerotic focus in the sternum. Multilevel degenerative changes of the  spine, similar to prior      Impression    IMPRESSION: Compared to prior CT from 5/6/2024, the current scan  shows:    1. 9 mm hypodensity in the pancreatic tail, similar to more  conspicuous compared to prior, indeterminate, differential include  IPMN, focal fatty infiltration versus others/neoplastic. Recommend  pancreatic protocol MRI for further evaluation.    2. Similar sized indeterminate medial left renal exophytic lesion and  subcentimeter posterior left renal exophytic lesion, differential  include complex cyst versus renal solid neoplasm. These may also be  evaluated at the time of abdominal MRI.    3. Hepatic steatosis. Similar sized too small to characterize hepatic  hypodensity, this may also be further evaluated at the time of  abdominal MRI.    4. Similar sized left thyroid nodule measuring up to 1.5 cm. Consider  ultrasound thyroid for further evaluation    5. Additional findings are similar to prior as described above  including subcentimeter pulmonary nodules, nodular adrenal  gland  thickening, consider attention on follow-up.    6. Mild prostatomegaly. Mild apparent thickening of the urinary  bladder possibly secondary to underdistention, consider correlation  for chronic obstructive outflow changes and/or urinalysis to evaluate  for cystitis, if clinically desired    [Consider Follow Up: Pancreatic hypodensity, indeterminate exophytic  left renal lesions]    This report will be copied to the [facility] to ensure a  provider acknowledges the finding. [facility] is available Monday  through Friday 8am-3:30 pm.    BERNADETTE ALTAMIRANO MD         SYSTEM ID:  [id number]       IMPRESSION/PLAN: Conjunctival melanoma and the right eye.  Melanoma is an ulcerated, 4.1 mm depth of invasion nodular melanoma in the right lower eyelid without lymphovascular or perineural invasion involving the inferior medial conjunctiva and tarsus.  Patient underwent definitive wide local excision of the conjunctiva and right lower eyelid achieving negative surgical margins, and 1 right neck sentinel lymph node was negative for metastasis (04/05/2023).  There is no proven benefit for systemic adjuvant therapy, although there are case series for immune checkpoint inhibitor for locally advanced disease if enucleation is not feasible or desired, or for metastatic disease.  Patient will continue close surveillance including clinical evaluation in medical oncology and ophthalmology clinic.  Surveillance consists of clinical evaluation every 3 months for year 1, every 6 months for years 2-3, and annually for years 4-5 and CT neck, chest, abdomen, pelvis every 6 months for years 1-2 and annually for years 3-5. Patient will return to clinic in 6 months with CBC, metabolic panel, and CT neck, chest, abdomen, pelvis. The current and past history, clinical evaluation, reviewing diagnostic tests and viewing images with the patient, and assessment and planning occurred over 30 minutes.       Dong Forte MD    cc:  MARITZA Baugh CNP  Keven Gomez MD       Again, thank you for allowing me to participate in the care of your patient.        Sincerely,        Dong Forte MD

## 2024-11-21 NOTE — NURSING NOTE
"Oncology Rooming Note    November 21, 2024 11:41 AM   Arthur Garcia is a 79 year old male who presents for:    Chief Complaint   Patient presents with    Oncology Clinic Visit     Malignant melanoma of conjunctiva, right     Initial Vitals: /80 (BP Location: Right arm, Patient Position: Sitting, Cuff Size: Adult Large)   Pulse 73   Temp 98.2  F (36.8  C) (Oral)   Resp 22   Wt 142 kg (313 lb)   SpO2 94%   BMI 46.89 kg/m   Estimated body mass index is 46.89 kg/m  as calculated from the following:    Height as of 5/16/24: 1.74 m (5' 8.5\").    Weight as of this encounter: 142 kg (313 lb). Body surface area is 2.62 meters squared.  Severe Pain (7) Comment: Data Unavailable   No LMP for male patient.  Allergies reviewed: Yes  Medications reviewed: Yes    Medications: Medication refills not needed today.  Pharmacy name entered into Explore.To Yellow Pages:    [facility] - [place], [place] - [address] [place] [address] [facility]ARM[facility]Y [place], [place] - [address] [facility] [phone]    Frailty Screening:   Is the patient here for a new oncology consult visit in cancer care? 2. No      Clinical concerns: Patient states no new concerns to discuss with provider.        Chin Cano, EMT            "

## 2024-11-26 ENCOUNTER — ANESTHESIA EVENT (OUTPATIENT)
Dept: SURGERY | Facility: CLINIC | Age: 79
End: 2024-11-26
Payer: COMMERCIAL

## 2024-11-26 ENCOUNTER — LAB (OUTPATIENT)
Dept: LAB | Facility: CLINIC | Age: 79
End: 2024-11-26
Payer: COMMERCIAL

## 2024-11-26 ENCOUNTER — OFFICE VISIT (OUTPATIENT)
Dept: FAMILY MEDICINE | Facility: CLINIC | Age: 79
End: 2024-11-26
Payer: COMMERCIAL

## 2024-11-26 VITALS
RESPIRATION RATE: 18 BRPM | DIASTOLIC BLOOD PRESSURE: 80 MMHG | BODY MASS INDEX: 47.74 KG/M2 | WEIGHT: 315 LBS | HEIGHT: 68 IN | OXYGEN SATURATION: 95 % | HEART RATE: 80 BPM | TEMPERATURE: 98.3 F | SYSTOLIC BLOOD PRESSURE: 138 MMHG

## 2024-11-26 DIAGNOSIS — C69.01: ICD-10-CM

## 2024-11-26 DIAGNOSIS — G47.33 OSA (OBSTRUCTIVE SLEEP APNEA): ICD-10-CM

## 2024-11-26 DIAGNOSIS — E83.52 HYPERCALCEMIA: ICD-10-CM

## 2024-11-26 DIAGNOSIS — J43.9 PULMONARY EMPHYSEMA, UNSPECIFIED EMPHYSEMA TYPE (H): ICD-10-CM

## 2024-11-26 DIAGNOSIS — I10 HYPERTENSION GOAL BP (BLOOD PRESSURE) < 140/90: ICD-10-CM

## 2024-11-26 DIAGNOSIS — E66.01 OBESITY, MORBID, BMI 40.0-49.9 (H): ICD-10-CM

## 2024-11-26 DIAGNOSIS — N18.4 CKD (CHRONIC KIDNEY DISEASE) STAGE 4, GFR 15-29 ML/MIN (H): ICD-10-CM

## 2024-11-26 DIAGNOSIS — Z11.59 NEED FOR HEPATITIS C SCREENING TEST: ICD-10-CM

## 2024-11-26 DIAGNOSIS — F33.1 MAJOR DEPRESSIVE DISORDER, RECURRENT, MODERATE (H): ICD-10-CM

## 2024-11-26 DIAGNOSIS — N18.32 CHRONIC KIDNEY DISEASE, STAGE 3B (H): Primary | ICD-10-CM

## 2024-11-26 DIAGNOSIS — Z01.818 PREOP GENERAL PHYSICAL EXAM: Primary | ICD-10-CM

## 2024-11-26 LAB
ANION GAP SERPL CALCULATED.3IONS-SCNC: 17 MMOL/L (ref 7–15)
BUN SERPL-MCNC: 31.4 MG/DL (ref 8–23)
CA-I BLD-MCNC: 4.6 MG/DL (ref 4.4–5.2)
CALCIUM SERPL-MCNC: 10 MG/DL (ref 8.8–10.4)
CHLORIDE SERPL-SCNC: 105 MMOL/L (ref 98–107)
CHOLEST SERPL-MCNC: 170 MG/DL
CREAT SERPL-MCNC: 2.32 MG/DL (ref 0.67–1.17)
EGFRCR SERPLBLD CKD-EPI 2021: 28 ML/MIN/1.73M2
FASTING STATUS PATIENT QL REPORTED: NO
FASTING STATUS PATIENT QL REPORTED: NO
GLUCOSE SERPL-MCNC: 97 MG/DL (ref 70–99)
HCO3 SERPL-SCNC: 21 MMOL/L (ref 22–29)
HDLC SERPL-MCNC: 29 MG/DL
LDLC SERPL CALC-MCNC: ABNORMAL MG/DL
NONHDLC SERPL-MCNC: 141 MG/DL
PHOSPHATE SERPL-MCNC: 2.9 MG/DL (ref 2.5–4.5)
POTASSIUM SERPL-SCNC: 3.5 MMOL/L (ref 3.4–5.3)
SODIUM SERPL-SCNC: 143 MMOL/L (ref 135–145)
TRIGL SERPL-MCNC: 540 MG/DL

## 2024-11-26 PROCEDURE — 82043 UR ALBUMIN QUANTITATIVE: CPT

## 2024-11-26 PROCEDURE — G2211 COMPLEX E/M VISIT ADD ON: HCPCS | Performed by: INTERNAL MEDICINE

## 2024-11-26 PROCEDURE — 84100 ASSAY OF PHOSPHORUS: CPT

## 2024-11-26 PROCEDURE — 83721 ASSAY OF BLOOD LIPOPROTEIN: CPT | Mod: 59

## 2024-11-26 PROCEDURE — 82330 ASSAY OF CALCIUM: CPT

## 2024-11-26 PROCEDURE — 99204 OFFICE O/P NEW MOD 45 MIN: CPT | Performed by: INTERNAL MEDICINE

## 2024-11-26 PROCEDURE — 82306 VITAMIN D 25 HYDROXY: CPT

## 2024-11-26 PROCEDURE — 82570 ASSAY OF URINE CREATININE: CPT

## 2024-11-26 PROCEDURE — 36415 COLL VENOUS BLD VENIPUNCTURE: CPT

## 2024-11-26 PROCEDURE — 80061 LIPID PANEL: CPT

## 2024-11-26 PROCEDURE — 83970 ASSAY OF PARATHORMONE: CPT

## 2024-11-26 PROCEDURE — 86803 HEPATITIS C AB TEST: CPT

## 2024-11-26 PROCEDURE — 80048 BASIC METABOLIC PNL TOTAL CA: CPT

## 2024-11-26 ASSESSMENT — PAIN SCALES - GENERAL: PAINLEVEL_OUTOF10: EXTREME PAIN (8)

## 2024-11-26 ASSESSMENT — COPD QUESTIONNAIRES: COPD: 1

## 2024-11-26 NOTE — PATIENT INSTRUCTIONS
How to Take Your Medication Before Surgery  Preoperative Medication Instructions   Antiplatelet or Anticoagulation Medication Instructions   - Patient is on no antiplatelet or anticoagulation medications.   - aspirin: Discontinue aspirin 7-10 days prior to procedure to reduce bleeding risk. It should be resumed postoperatively.     Additional Medication Instructions   - GLP-1 Injectable (exenitide, liraglutide, semaglutide (Wegovy), dulaglutide, etc.): DO NOT TAKE 7 days before surgery   - Hold Celebrex as instructed.       Patient Education   Preparing for Your Surgery  For Adults  Getting started  In most cases, a nurse will call to review your health history and instructions. They will give you an arrival time based on your scheduled surgery time. Please be ready to share:  Your doctor's clinic name and phone number  Your medical, surgical, and anesthesia history  A list of allergies and sensitivities  A list of medicines, including herbal treatments and over-the-counter drugs  Whether the patient has a legal guardian (ask how to send us the papers in advance)  Note: You may not receive a call if you were seen at our PAC (Preoperative Assessment Center).  Please tell us if you're pregnant--or if there's any chance you might be pregnant. Some surgeries may injure a fetus (unborn baby), so they require a pregnancy test. Surgeries that are safe for a fetus don't always need a test, and you can choose whether to have one.   Preparing for surgery  Within 10 to 30 days of surgery: Have a pre-op exam (sometimes called an H&P, or History and Physical). This can be done at a clinic or pre-operative center.  If you're having a , you may not need this exam. Talk to your care team.  At your pre-op exam, talk to your care team about all medicines you take. (This includes CBD oil and any drugs, such as THC, marijuana, and other forms of cannabis.) If you need to stop any medicine before surgery, ask when to start  taking it again.  This is for your safety. Many medicines and drugs can make you bleed too much during surgery. Some change how well surgery (anesthesia) drugs work.  Call your insurance company to let them know you're having surgery. (If you don't have insurance, call [phone].)  Call your clinic if there's any change in your health. This includes a scrape or scratch near the surgery site, or any signs of a cold (sore throat, runny nose, cough, rash, fever).  Eating and drinking guidelines  For your safety: Unless your surgeon tells you otherwise, follow the guidelines below.  Eat and drink as normal until 8 hours before you arrive for surgery. After that, no food or milk. You can spit out gum when you arrive.  Drink clear liquids until 2 hours before you arrive. These are liquids you can see through, like water, Gatorade, and Propel Water. They also include plain black coffee and tea (no cream or milk).  No alcohol for 24 hours before you arrive. The night before surgery, stop any drinks that contain THC.  If your care team tells you to take medicine on the morning of surgery, it's okay to take it with a sip of water. No other medicines or drugs are allowed (including CBD oil)--follow your care team's instructions.  If you have questions the day of surgery, call your hospital or surgery center.   Preventing infection  Shower or bathe the night before and the morning of surgery. Follow the instructions your clinic gave you. (If no instructions, use regular soap.)  Don't shave or clip hair near your surgery site. We'll remove the hair if needed.  Don't smoke or vape the morning of surgery. No chewing tobacco for 6 hours before you arrive. A nicotine patch is okay. You may spit out nicotine gum when you arrive.  For some surgeries, the surgeon will tell you to fully quit smoking and nicotine.  We will make every effort to keep you safe from infection. We will:  Clean our hands often with soap and water (or an  alcohol-based hand rub).  Clean the skin at your surgery site with a special soap that kills germs.  Give you a special gown to keep you warm. (Cold raises the risk of infection.)  Wear hair covers, masks, gowns, and gloves during surgery.  Give antibiotic medicine, if prescribed. Not all surgeries need this medicine.  What to bring on the day of surgery  Photo ID and insurance card  Copy of your health care directive, if you have one  Glasses and hearing aids (bring cases)  You can't wear contacts during surgery  Inhaler and eye drops, if you use them (tell us about these when you arrive)  CPAP machine or breathing device, if you use them  A few personal items, if spending the night  If you have . . .  A pacemaker, ICD (cardiac defibrillator), or other implant: Bring the ID card.  An implanted stimulator: Bring the remote control.  A legal guardian: Bring a copy of the certified (court-stamped) guardianship papers.  Please remove any jewelry, including body piercings. Leave jewelry and other valuables at home.  If you're going home the day of surgery  You must have a responsible adult drive you home. They should stay with you overnight as well.  If you don't have someone to stay with you, and you aren't safe to go home alone, we may keep you overnight. Insurance often won't pay for this.  After surgery  If it's hard to control your pain or you need more pain medicine, please call your surgeon's office.  Questions?   If you have any questions for your care team, list them here:   ____________________________________________________________________________________________________________________________________________________________________________________________________________________________________________________________  For informational purposes only. Not to replace the advice of your health care provider. Copyright   2003, 2019 [facility]. All rights reserved. Clinically reviewed by Rip  MD Lino. [place] [id number] - REV 08/24.

## 2024-11-26 NOTE — PROGRESS NOTES
Preoperative Evaluation  [facility]  [address] [address]  Phone: [phone]  Primary Provider: [facility]  Pre-op Performing Provider: Shayla Maria MD PhD  Nov 26, 2024 11/26/2024   Surgical Information   What procedure is being done? REPAIR, ECTROPION, RIGHT LOWER  EYELID RIGHT LOWER EYELID BIOPSY (Right: Eye)    Facility or Hospital where procedure/surgery will be performed: [facility]    Who is doing the procedure / surgery? Keven Gomez MD    Date of surgery / procedure: 11/27/2024    Time of surgery / procedure: 8:00 AM    Where do you plan to recover after surgery? at home with family        Patient-reported     Fax number for surgical facility: Note does not need to be faxed, will be available electronically in Epic.    Assessment & Plan     The proposed surgical procedure is considered INTERMEDIATE risk.    Arthur was seen today for pre-op exam.    Diagnoses and all orders for this visit:    Preop general physical exam    Melanoma of conjunctiva, right (H)    CKD (chronic kidney disease) stage 4, GFR 15-29 ml/min (H)  Comments:  stable from 2 weeks ago but slightly worse than reported baseline    Pulmonary emphysema, unspecified emphysema type (H)  Comments:  not on inhalers.    Major depressive disorder, recurrent, moderate (H)    Obesity, morbid, BMI 40.0-49.9 (H)    Hypercalcemia  -     Basic metabolic panel  (Ca, Cl, CO2, Creat, Gluc, K, Na, BUN); Future  -     Parathyroid Hormone Intact; Future  -     Cancel: Vitamin D Deficiency; Future  -     Ionized Calcium; Future  -     Vitamin D Deficiency; Future    Hypertension goal BP (blood pressure) < 140/90  Comments:  BP at goal.    PIPPA (obstructive sleep apnea)  Comments:  on CPAP    Other orders  -     REVIEW OF HEALTH MAINTENANCE PROTOCOL ORDERS       Repeat labs showed renal function into the CKD 4 range but stable from 2 weeks ago. This is  a little worse than his baseline but not prohibitive for surgery.    I recommend he reduce the dose of Maxzide to 1/2 tablet and [facility] and follow up on kidney function.         - No identified additional risk factors other than previously addressed    Preoperative Medication Instructions  Antiplatelet or Anticoagulation Medication Instructions   - Patient is on no antiplatelet or anticoagulation medications.   - aspirin: Discontinue aspirin 7-10 days prior to procedure to reduce bleeding risk. It should be resumed postoperatively.     Additional Medication Instructions   - GLP-1 Injectable (exenitide, liraglutide, semaglutide (Wegovy), dulaglutide, etc.): DO NOT TAKE 7 days before surgery   - Hold Celebrex as instructed.    Recommendation  Approval given to proceed with proposed procedure, without further diagnostic evaluation.    Leonard Gibson is a 79 year old, presenting for the following:  Pre-Op Exam          11/26/2024     3:47 PM   Additional Questions   Roomed by Hemalatha PRO related to upcoming procedure:   Patient with history of melanoma of right conjunctiva.  He is scheduled for right surgery.  Patient reported this will be his sixth surgery.    This is a new patient to this provider.  [facility] [place] [facility].  He has already been told by his primary to hold the medications including aspirin Celebrex and Ozempic.    At his recent oncology follow up, had labs done with slightly worsening Creatinine and hypercalcemia 11.5.           11/26/2024   Pre-Op Questionnaire   Have you ever had a heart attack or stroke? No    Have you ever had surgery on your heart or blood vessels, such as a stent placement, a coronary artery bypass, or surgery on an artery in your head, neck, heart, or legs? No    Do you have chest pain with activity? No    Do you have a history of heart failure? No    Do you currently have a cold, bronchitis or symptoms of other  infection? No    Do you have a cough, shortness of breath, or wheezing? No    Do you or anyone in your family have previous history of blood clots? No    Do you or does anyone in your family have a serious bleeding problem such as prolonged bleeding following surgeries or cuts? No    Have you ever had problems with anemia or been told to take iron pills? No    Have you had any abnormal blood loss such as black, tarry or bloody stools? No    Have you ever had a blood transfusion? (!) UNKNOWN    Are you willing to have a blood transfusion if it is medically needed before, during, or after your surgery? Yes    Have you or any of your relatives ever had problems with anesthesia? No    Do you have sleep apnea, excessive snoring or daytime drowsiness? (!) YES    Do you have a CPAP machine? Yes    Do you have any artifical heart valves or other implanted medical devices like a pacemaker, defibrillator, or continuous glucose monitor? No    Do you have artificial joints? No    Are you allergic to latex? No        Patient-reported     Health Care Directive  Patient does not have a Health Care Directive: Discussed advance care planning with patient; however, patient declined at this time.    Preoperative Review of    reviewed - no record of controlled substances prescribed.          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/04/2023     Priority: Medium    Obesity, morbid, BMI 40.0-49.9 (H) 01/04/2023     Priority: Medium    Chronic kidney disease, stage 3b (H) 01/04/2023     Priority: Medium    CARDIOVASCULAR SCREENING; LDL GOAL LESS THAN 130 02/19/2014     Priority: Medium      Past Medical History:   Diagnosis Date    Arthritis     Depression     Disorder of  lipoprotein and lipid metabolism     Gout     Hypertension     Sleep apnea      Past Surgical History:   Procedure Laterality Date    APPENDECTOMY  01/01/2000    BIOPSY NODE SENTINEL Right 4/5/2023    Procedure: SENTINAL LYMPH  NODE BIOPSY;  Surgeon: Jose Guadalupe Warner MD;  Location: [facility]    CHOLECYSTECTOMY  1999 or 2000    EXCISE LESION CONJUNCTIVAL Right 2/13/2023    Procedure: wide local excision of right conjuntivia lesion;  Surgeon: Keven Gomez MD;  Location: [facility] OR    EXCISE LESION EYELID Right 11/22/2023    Procedure: RIGHT UPPER EYELID LESION EXCISION;  Surgeon: Keven Gomez MD;  Location: [facility]    EXCISE TUMOR EYELID Right 4/5/2023    Procedure: Wide Excision of Right Lower eyelid melanoma;  Surgeon: Keven Gomez MD;  Location: [facility] OR    PLACEMENT, AMNIOTIC MEMBRANE GRAFT Right 2/13/2023    Procedure: PLACEMENT, AMNIOTIC MEMBRANE GRAFT;  Surgeon: Keven Gomez MD;  Location: [facility] OR    SD TRABECULOPLASTY BY LASER SURGERY      RECONSTRUCT EYELID Right 6/28/2023    Procedure: SECOND STAGE RECONSTRUCTION, RIGHT LOWER EYELID;  Surgeon: Keven Gomez MD;  Location: [facility]    RECONSTRUCT EYELID WITH POSTAURICULAR SKIN GRAFT Right 4/19/2023    Procedure: right RECONSTRUCTION EYELID, MEDIAL AND LATERAL BIOPSY;  Surgeon: Keven Gomez MD;  Location: [facility] OR    REPAIR ECTROPION Right 11/22/2023    Procedure: Right lower eyelid ectropion repair by tarsal strip procedure;  Surgeon: Keven Gomez MD;  Location: [facility]     Current Outpatient Medications   Medication Sig Dispense Refill    acetaminophen (TYLENOL) 325 MG tablet Take 2 tablets (650 mg) by mouth every 4 hours as needed for mild pain 50 tablet 0    aspirin (ASA) 81 MG chewable tablet Take 81 mg by mouth daily.      atenolol (TENORMIN) 25 MG tablet Take 25 mg by mouth daily Once daily      brimonidine (ALPHAGAN-P) 0.15 % ophthalmic solution 1 drop daily.      buPROPion (WELLBUTRIN) 75 MG tablet Take by mouth.      buPROPion  (WELLBUTRIN) 75 MG tablet Once daily      celecoxib (CELEBREX) 100 MG capsule Take 100 mg by mouth 2 times daily.      cholecalciferol 50 MCG (2000 UT) tablet TAKE ONE TABLET BY MOUTH DAILY FOR VITAMIN D SUPPLEMENT TAKE WITH EVENING MEAL.      cycloSPORINE (RESTASIS) 0.05 % ophthalmic emulsion Place 1 drop into both eyes 2 times daily.      dexamethasone (DECADRON) 0.1 % ophthalmic suspension Apply 1 drop to eye.      DULoxetine (CYMBALTA) 60 MG capsule TAKE ONE CAPSULE BY MOUTH EVERY MORNING FOR PAIN/DEPRESSION.      erythromycin (ROMYCIN) 5 MG/GM ophthalmic ointment Apply antibiotic ointment to all sutures three times a day, and 1/2 inch strip into the operated eye(s) at night. Use until follow up. 3.5 g 1    febuxostat (ULORIC) 80 MG TABS tablet TAKE ONE TABLET BY MOUTH EVERY MORNING FOR GOUT      flunisolide (NASALIDE) 25 MCG/ACT (0.025%) SOLN spray Spray 2 sprays in nostril as needed.      fluticasone (FLONASE) 50 MCG/ACT nasal spray Spray 1 spray into both nostrils as needed.      ketoconazole (NIZORAL) 2 % external shampoo Apply topically three times a week. Lather in the shower, wait 3-5 minutes before rinsing. 120 mL 11    ketorolac (ACULAR) 0.5 % ophthalmic solution Apply 1 drop to eye.      loratadine (CLARITIN) 10 MG tablet Take 10 mg by mouth as needed.      melatonin 3 MG tablet Take 3-6 mg by mouth nightly as needed for sleep      methocarbamol (ROBAXIN) 750 MG tablet as needed.      Multiple Vitamin (THERA-TABS) TABS Take 1 tablet by mouth      neomycin-polymixin-dexAMETHasone (MAXITROL) 0.1 % ophthalmic suspension Please instill into the operative eye(s) four times daily until the bottle is finished. 5 mL 0    neomycin-polymixin-dexamethasone (MAXITROL) 0.1 % ophthalmic suspension Place 1 drop into the right eye 4 times daily 3 mL 0    polyethylene glycol-propylene glycol (SYSTANE ULTRA) 0.4-0.3 % SOLN ophthalmic solution       pravastatin (PRAVACHOL) 40 MG tablet TAKE ONE TABLET BY MOUTH DAILY FOR  "CHOLESTEROL      prednisoLONE acetate (PRED FORTE) 1 % ophthalmic suspension Place 1 drop into the right eye 4 times daily      predniSONE (DELTASONE) 20 MG tablet 40 mg      Semaglutide-Weight Management (WEGOVY) 2.4 MG/0.75ML pen Inject 2.4 mg subcutaneously once a week.      tamsulosin (FLOMAX) 0.4 MG capsule TAKE TWO CAPSULES BY MOUTH ONCE A DAY FOR PROSTATE      traZODone (DESYREL) 100 MG tablet Take 1 tablet by mouth as needed.      triamterene-hydrochlorothiazide (MAXZIDE) 75-50 MG per tablet Take 1 tablet by mouth daily Once daily      vitamin C (ASCORBIC ACID) 500 MG tablet Take 500 mg by mouth daily      Witch Hazel (MEDICATED WIPES) 50 % PADS Apply topically.         Allergies   Allergen Reactions    Atorvastatin Muscle Pain (Myalgia)    Codeine Hives    Simvastatin Muscle Pain (Myalgia)     Other reaction(s): Muscle pain, Joint pain    Adhesive Tape Rash     Mepliex with border.    Medical Adhesive Remover Rash     Severely allergic to Coban - causing severe itching    Propoxyphene Rash and Swelling        Social History     Tobacco Use    Smoking status: Never    Smokeless tobacco: Never   Substance Use Topics    Alcohol use: No     Family History   Problem Relation Age of Onset    Glaucoma Mother     Retinal detachment Father     Macular Degeneration No family hx of     Melanoma No family hx of     Skin Cancer No family hx of      History   Drug Use No             Review of Systems  Constitutional, HEENT, cardiovascular, pulmonary, gi and gu systems are negative, except as otherwise noted.    Objective    /80 (BP Location: Left arm, Patient Position: Sitting, Cuff Size: Adult Large)   Pulse 80   Temp 98.3  F (36.8  C) (Oral)   Resp 18   Ht 1.727 m (5' 8\")   Wt 145 kg (319 lb 11.2 oz)   SpO2 95%   BMI 48.61 kg/m     Estimated body mass index is 48.61 kg/m  as calculated from the following:    Height as of this encounter: 1.727 m (5' 8\").    Weight as of this encounter: 145 kg (319 lb 11.2 " oz).  Physical Exam  GENERAL: alert and no distress  EYES: Eyes grossly normal to inspection, PERRL and conjunctivae and sclerae normal  RESP: lungs clear to auscultation - no rales, rhonchi or wheezes  CV: regular rate and rhythm, normal S1 S2, no S3 or S4, no murmur, click or rub, no peripheral edema  ABDOMEN: soft, nontender, no hepatosplenomegaly, no masses and bowel sounds normal  MS: no gross musculoskeletal defects noted, no edema  SKIN: no suspicious lesions or rashes  NEURO: Normal strength and tone, mentation intact and speech normal  PSYCH: mentation appears normal, affect normal/bright    Recent Labs   Lab Test 11/15/24  0934 05/06/24  1603   HGB 16.6 15.9    170    141   POTASSIUM 3.9 3.6   CR 2.19* 1.85*        Diagnostics  Recent Results (from the past 24 hours)   Basic metabolic panel  (Ca, Cl, CO2, Creat, Gluc, K, Na, BUN)    Collection Time: 11/26/24  5:54 PM   Result Value Ref Range    Sodium 143 135 - 145 mmol/L    Potassium 3.5 3.4 - 5.3 mmol/L    Chloride 105 98 - 107 mmol/L    Carbon Dioxide (CO2) 21 (L) 22 - 29 mmol/L    Anion Gap 17 (H) 7 - 15 mmol/L    Urea Nitrogen 31.4 (H) 8.0 - 23.0 mg/dL    Creatinine 2.32 (H) 0.67 - 1.17 mg/dL    GFR Estimate 28 (L) >60 mL/min/1.73m2    Calcium 10.0 8.8 - 10.4 mg/dL    Glucose 97 70 - 99 mg/dL    Patient Fasting > 8hrs? No    Ionized Calcium    Collection Time: 11/26/24  5:54 PM   Result Value Ref Range    Calcium Ionized Whole Blood 4.6 4.4 - 5.2 mg/dL   Lipid panel reflex to direct LDL Non-fasting    Collection Time: 11/26/24  5:54 PM   Result Value Ref Range    Cholesterol 170 <200 mg/dL    Triglycerides 540 (H) <150 mg/dL    Direct Measure HDL 29 (L) >=40 mg/dL    LDL Cholesterol Calculated      Non HDL Cholesterol 141 (H) <130 mg/dL    Patient Fasting > 8hrs? No    Phosphorus    Collection Time: 11/26/24  5:54 PM   Result Value Ref Range    Phosphorus 2.9 2.5 - 4.5 mg/dL      No EKG required for low risk surgery (cataract, skin  procedure, breast biopsy, etc).    Revised Cardiac Risk Index (RCRI)  The patient has the following serious cardiovascular risks for perioperative complications:   - Serum Creatinine >2.0 mg/dl = 1 point     RCRI Interpretation: 1 point: Class II (low risk - 0.9% complication rate)         Signed Electronically by: Shayla Maria MD PhD  A copy of this evaluation report is provided to the requesting physician.

## 2024-11-27 ENCOUNTER — TELEPHONE (OUTPATIENT)
Dept: FAMILY MEDICINE | Facility: CLINIC | Age: 79
End: 2024-11-27
Payer: COMMERCIAL

## 2024-11-27 ENCOUNTER — HOSPITAL ENCOUNTER (OUTPATIENT)
Facility: CLINIC | Age: 79
Discharge: HOME | End: 2024-11-27
Attending: OPHTHALMOLOGY | Admitting: OPHTHALMOLOGY
Payer: COMMERCIAL

## 2024-11-27 ENCOUNTER — ANESTHESIA (OUTPATIENT)
Dept: SURGERY | Facility: CLINIC | Age: 79
End: 2024-11-27
Payer: COMMERCIAL

## 2024-11-27 VITALS
HEART RATE: 59 BPM | DIASTOLIC BLOOD PRESSURE: 73 MMHG | HEIGHT: 67 IN | OXYGEN SATURATION: 99 % | TEMPERATURE: 97.7 F | RESPIRATION RATE: 12 BRPM | WEIGHT: 315 LBS | BODY MASS INDEX: 49.44 KG/M2 | SYSTOLIC BLOOD PRESSURE: 122 MMHG

## 2024-11-27 LAB
CREAT UR-MCNC: 182 MG/DL
HCV AB SERPL QL IA: NONREACTIVE
LDLC SERPL DIRECT ASSAY-MCNC: 52 MG/DL
MICROALBUMIN UR-MCNC: 103 MG/L
MICROALBUMIN/CREAT UR: 56.59 MG/G CR (ref 0–17)
PTH-INTACT SERPL-MCNC: 47 PG/ML (ref 15–65)
VIT D+METAB SERPL-MCNC: 114 NG/ML (ref 20–50)

## 2024-11-27 PROCEDURE — 88342 IMHCHEM/IMCYTCHM 1ST ANTB: CPT | Mod: 26 | Performed by: OPHTHALMOLOGY

## 2024-11-27 PROCEDURE — 250N000009 HC RX 250

## 2024-11-27 PROCEDURE — 88341 IMHCHEM/IMCYTCHM EA ADD ANTB: CPT | Mod: 26 | Performed by: OPHTHALMOLOGY

## 2024-11-27 PROCEDURE — 88342 IMHCHEM/IMCYTCHM 1ST ANTB: CPT | Mod: TC | Performed by: OPHTHALMOLOGY

## 2024-11-27 PROCEDURE — 999N000141 HC STATISTIC PRE-PROCEDURE NURSING ASSESSMENT: Performed by: OPHTHALMOLOGY

## 2024-11-27 PROCEDURE — 88341 IMHCHEM/IMCYTCHM EA ADD ANTB: CPT | Mod: TC | Performed by: OPHTHALMOLOGY

## 2024-11-27 PROCEDURE — 370N000017 HC ANESTHESIA TECHNICAL FEE, PER MIN: Performed by: OPHTHALMOLOGY

## 2024-11-27 PROCEDURE — 258N000003 HC RX IP 258 OP 636

## 2024-11-27 PROCEDURE — 360N000076 HC SURGERY LEVEL 3, PER MIN: Performed by: OPHTHALMOLOGY

## 2024-11-27 PROCEDURE — 67917 REPAIR EYELID DEFECT: CPT | Mod: E4 | Performed by: OPHTHALMOLOGY

## 2024-11-27 PROCEDURE — 88305 TISSUE EXAM BY PATHOLOGIST: CPT | Mod: 26 | Performed by: OPHTHALMOLOGY

## 2024-11-27 PROCEDURE — 250N000011 HC RX IP 250 OP 636: Performed by: OPHTHALMOLOGY

## 2024-11-27 PROCEDURE — 250N000009 HC RX 250: Performed by: OPHTHALMOLOGY

## 2024-11-27 PROCEDURE — 250N000011 HC RX IP 250 OP 636

## 2024-11-27 PROCEDURE — 710N000012 HC RECOVERY PHASE 2, PER MINUTE: Performed by: OPHTHALMOLOGY

## 2024-11-27 PROCEDURE — 67810 INCAL BX EYELID SKN LID MRGN: CPT | Mod: E4 | Performed by: OPHTHALMOLOGY

## 2024-11-27 PROCEDURE — 272N000001 HC OR GENERAL SUPPLY STERILE: Performed by: OPHTHALMOLOGY

## 2024-11-27 RX ORDER — ONDANSETRON 4 MG/1
4 TABLET, ORALLY DISINTEGRATING ORAL EVERY 30 MIN PRN
Status: DISCONTINUED | OUTPATIENT
Start: 2024-11-27 | End: 2024-11-27 | Stop reason: HOSPADM

## 2024-11-27 RX ORDER — OXYCODONE HYDROCHLORIDE 10 MG/1
10 TABLET ORAL
Status: DISCONTINUED | OUTPATIENT
Start: 2024-11-27 | End: 2024-11-27 | Stop reason: HOSPADM

## 2024-11-27 RX ORDER — DEXAMETHASONE SODIUM PHOSPHATE 4 MG/ML
4 INJECTION, SOLUTION INTRA-ARTICULAR; INTRALESIONAL; INTRAMUSCULAR; INTRAVENOUS; SOFT TISSUE
Status: DISCONTINUED | OUTPATIENT
Start: 2024-11-27 | End: 2024-11-27 | Stop reason: HOSPADM

## 2024-11-27 RX ORDER — FENTANYL CITRATE 50 UG/ML
25 INJECTION, SOLUTION INTRAMUSCULAR; INTRAVENOUS
Status: DISCONTINUED | OUTPATIENT
Start: 2024-11-27 | End: 2024-11-27 | Stop reason: HOSPADM

## 2024-11-27 RX ORDER — ERYTHROMYCIN 5 MG/G
OINTMENT OPHTHALMIC PRN
Status: DISCONTINUED | OUTPATIENT
Start: 2024-11-27 | End: 2024-11-27 | Stop reason: HOSPADM

## 2024-11-27 RX ORDER — ONDANSETRON 2 MG/ML
4 INJECTION INTRAMUSCULAR; INTRAVENOUS EVERY 30 MIN PRN
Status: DISCONTINUED | OUTPATIENT
Start: 2024-11-27 | End: 2024-11-27 | Stop reason: HOSPADM

## 2024-11-27 RX ORDER — SODIUM CHLORIDE, SODIUM LACTATE, POTASSIUM CHLORIDE, CALCIUM CHLORIDE 600; 310; 30; 20 MG/100ML; MG/100ML; MG/100ML; MG/100ML
INJECTION, SOLUTION INTRAVENOUS CONTINUOUS PRN
Status: DISCONTINUED | OUTPATIENT
Start: 2024-11-27 | End: 2024-11-27

## 2024-11-27 RX ORDER — FENTANYL CITRATE 50 UG/ML
INJECTION, SOLUTION INTRAMUSCULAR; INTRAVENOUS PRN
Status: DISCONTINUED | OUTPATIENT
Start: 2024-11-27 | End: 2024-11-27

## 2024-11-27 RX ORDER — TETRACAINE HYDROCHLORIDE 5 MG/ML
SOLUTION OPHTHALMIC PRN
Status: DISCONTINUED | OUTPATIENT
Start: 2024-11-27 | End: 2024-11-27 | Stop reason: HOSPADM

## 2024-11-27 RX ORDER — LIDOCAINE HYDROCHLORIDE AND EPINEPHRINE 10; 10 MG/ML; UG/ML
INJECTION, SOLUTION INFILTRATION; PERINEURAL PRN
Status: DISCONTINUED | OUTPATIENT
Start: 2024-11-27 | End: 2024-11-27 | Stop reason: HOSPADM

## 2024-11-27 RX ORDER — PROPOFOL 10 MG/ML
INJECTION, EMULSION INTRAVENOUS PRN
Status: DISCONTINUED | OUTPATIENT
Start: 2024-11-27 | End: 2024-11-27

## 2024-11-27 RX ORDER — LIDOCAINE HYDROCHLORIDE 20 MG/ML
INJECTION, SOLUTION INFILTRATION; PERINEURAL PRN
Status: DISCONTINUED | OUTPATIENT
Start: 2024-11-27 | End: 2024-11-27

## 2024-11-27 RX ORDER — NALOXONE HYDROCHLORIDE 0.4 MG/ML
0.1 INJECTION, SOLUTION INTRAMUSCULAR; INTRAVENOUS; SUBCUTANEOUS
Status: DISCONTINUED | OUTPATIENT
Start: 2024-11-27 | End: 2024-11-27 | Stop reason: HOSPADM

## 2024-11-27 RX ORDER — ONDANSETRON 2 MG/ML
INJECTION INTRAMUSCULAR; INTRAVENOUS PRN
Status: DISCONTINUED | OUTPATIENT
Start: 2024-11-27 | End: 2024-11-27

## 2024-11-27 RX ORDER — BALANCED SALT SOLUTION 6.4; .75; .48; .3; 3.9; 1.7 MG/ML; MG/ML; MG/ML; MG/ML; MG/ML; MG/ML
SOLUTION OPHTHALMIC PRN
Status: DISCONTINUED | OUTPATIENT
Start: 2024-11-27 | End: 2024-11-27 | Stop reason: HOSPADM

## 2024-11-27 RX ORDER — OXYCODONE HYDROCHLORIDE 5 MG/1
5 TABLET ORAL
Status: DISCONTINUED | OUTPATIENT
Start: 2024-11-27 | End: 2024-11-27 | Stop reason: HOSPADM

## 2024-11-27 RX ADMIN — PROPOFOL 90 MG: 10 INJECTION, EMULSION INTRAVENOUS at 11:46

## 2024-11-27 RX ADMIN — DEXMEDETOMIDINE HYDROCHLORIDE 8 MCG: 100 INJECTION, SOLUTION INTRAVENOUS at 11:45

## 2024-11-27 RX ADMIN — SODIUM CHLORIDE, POTASSIUM CHLORIDE, SODIUM LACTATE AND CALCIUM CHLORIDE: 600; 310; 30; 20 INJECTION, SOLUTION INTRAVENOUS at 11:37

## 2024-11-27 RX ADMIN — FENTANYL CITRATE 25 MCG: 50 INJECTION INTRAMUSCULAR; INTRAVENOUS at 11:54

## 2024-11-27 RX ADMIN — ONDANSETRON 4 MG: 2 INJECTION INTRAMUSCULAR; INTRAVENOUS at 12:06

## 2024-11-27 RX ADMIN — LIDOCAINE HYDROCHLORIDE 40 MG: 20 INJECTION, SOLUTION INFILTRATION; PERINEURAL at 11:45

## 2024-11-27 ASSESSMENT — ACTIVITIES OF DAILY LIVING (ADL)
ADLS_ACUITY_SCORE: 40
ADLS_ACUITY_SCORE: 39
ADLS_ACUITY_SCORE: 38
ADLS_ACUITY_SCORE: 38
ADLS_ACUITY_SCORE: 39
ADLS_ACUITY_SCORE: 38

## 2024-11-27 NOTE — BRIEF OP NOTE
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  Complications: None.  Implants: * No implants in log *

## 2024-11-27 NOTE — TELEPHONE ENCOUNTER
Called and spoke with patient.    Relayed message from provider below. Pt verbalized understanding and will [facility] regarding the increased triglycerides and worsened renal function.    Marilyn Sadler RN, BSN  [facility] Primary Care Triage        ----- Message from Shayla Maria sent at 11/26/2024  9:59 PM CST -----  Please let pt know that his labs are acceptable for surgery. But his kidney function is worse than baseline. After his eye surgery, I recommend he reduced the dose of Maxzide to 1/2 tablet a day and follow up with [facility] for his blood pressure and kidney function.     His triglycerides were pretty high. Not sure if that was a true fasting lab. He is on pravastatin. That dose may need to be adjusted. See [facility] doctor about this.

## 2024-11-27 NOTE — ANESTHESIA PREPROCEDURE EVALUATION
Anesthesia Pre-Procedure Evaluation    Patient: Arthur Garcia   MRN: [id number] : 1945        Procedure : Procedure(s):  REPAIR, ECTROPION, RIGHT LOWER  EYELID RIGHT LOWER EYELID BIOPSY          Past Medical History:   Diagnosis Date     Arthritis      Depression      Disorder of lipoprotein and lipid metabolism      Gout      Hypertension      Sleep apnea       Past Surgical History:   Procedure Laterality Date     APPENDECTOMY  2000     BIOPSY NODE SENTINEL Right 2023    Procedure: SENTINAL LYMPH  NODE BIOPSY;  Surgeon: Jose Guadalupe Warner MD;  Location: [facility]     CHOLECYSTECTOMY   or      EXCISE LESION CONJUNCTIVAL Right 2023    Procedure: wide local excision of right conjuntivia lesion;  Surgeon: Keven Gomez MD;  Location: [facility]     EXCISE LESION EYELID Right 2023    Procedure: RIGHT UPPER EYELID LESION EXCISION;  Surgeon: Keven Gomez MD;  Location: [facility]     EXCISE TUMOR EYELID Right 2023    Procedure: Wide Excision of Right Lower eyelid melanoma;  Surgeon: Keven Gomez MD;  Location: [facility]     PLACEMENT, AMNIOTIC MEMBRANE GRAFT Right 2023    Procedure: PLACEMENT, AMNIOTIC MEMBRANE GRAFT;  Surgeon: Keven Gomez MD;  Location: [facility]     NC TRABECULOPLASTY BY LASER SURGERY       RECONSTRUCT EYELID Right 2023    Procedure: SECOND STAGE RECONSTRUCTION, RIGHT LOWER EYELID;  Surgeon: Keven Gomez MD;  [facility]: [facility]     RECONSTRUCT EYELID WITH POSTAURICULAR SKIN GRAFT Right 2023    Procedure: right RECONSTRUCTION EYELID, MEDIAL AND LATERAL BIOPSY;  Surgeon: Keven Gomez MD;  Location: [facility]     REPAIR ECTROPION Right 2023    Procedure: Right lower eyelid ectropion repair by tarsal strip procedure;  Surgeon: Keven Gomez MD;  Location: [facility]      Allergies   Allergen Reactions     Atorvastatin Muscle Pain (Myalgia)     Codeine Hives     Simvastatin Muscle Pain (Myalgia)     Other reaction(s): Muscle pain, Joint pain      Adhesive Tape Rash     Mepliex with border.     Medical Adhesive Remover Rash     Severely allergic to Coban - causing severe itching     Propoxyphene Rash and Swelling      Social History     Tobacco Use     Smoking status: Never     Smokeless tobacco: Never   Substance Use Topics     Alcohol use: No      Wt Readings from Last 1 Encounters:   11/26/24 145 kg (319 lb 11.2 oz)        Anesthesia Evaluation   Pt has had prior anesthetic. Type: General.        ROS/MED HX  ENT/Pulmonary:     (+) sleep apnea, uses CPAP,                    asthma    COPD,              Neurologic:  - neg neurologic ROS     Cardiovascular: Comment: Sinus rhythm with 1st degree heart block    (+)  hypertension- -   -  - -                                      METS/Exercise Tolerance:     Hematologic:  - neg hematologic  ROS     Musculoskeletal: Comment: Fibromyalgia, Gout      GI/Hepatic:       Renal/Genitourinary:     (+) renal disease, type: CRI,            Endo:     (+)               Obesity,       Psychiatric/Substance Use: Comment: PTSD      Infectious Disease:       Malignancy: Comment: Melanoma s/p wide local excision      Other:            Physical Exam    Airway        Mallampati: II   TM distance: > 3 FB   Neck ROM: full   Mouth opening: > 3 cm    Respiratory Devices and Support         Dental       (+) Multiple crowns, permanant bridges      Cardiovascular   cardiovascular exam normal          Pulmonary   pulmonary exam normal            OUTSIDE LABS:  CBC:   Lab Results   Component Value Date    WBC 7.4 11/15/2024    WBC 7.9 05/06/2024    HGB 16.6 11/15/2024    HGB 15.9 05/06/2024    HCT 46.9 11/15/2024    HCT 45.1 05/06/2024     11/15/2024     05/06/2024     BMP:   Lab Results   Component Value Date     11/26/2024     11/15/2024    POTASSIUM 3.5 11/26/2024    POTASSIUM 3.9 11/15/2024    CHLORIDE 105 11/26/2024    CHLORIDE 104 11/15/2024    CO2 21 (L) 11/26/2024    CO2 25 11/15/2024    BUN 31.4 (H)  "11/26/2024    BUN 27.7 (H) 11/15/2024    CR 2.32 (H) 11/26/2024    CR 2.19 (H) 11/15/2024    GLC 97 11/26/2024     (H) 11/15/2024     COAGS: No results found for: \"PTT\", \"INR\", \"FIBR\"  POC: No results found for: \"BGM\", \"HCG\", \"HCGS\"  HEPATIC:   Lab Results   Component Value Date    ALBUMIN 4.2 11/15/2024    PROTTOTAL 7.5 11/15/2024    ALT 47 11/15/2024    AST 28 11/15/2024    ALKPHOS 65 11/15/2024    BILITOTAL 0.7 11/15/2024     OTHER:   Lab Results   Component Value Date    MACK 10.0 11/26/2024    PHOS 2.9 11/26/2024       Anesthesia Plan    ASA Status:  3    NPO Status:  NPO Appropriate    Anesthesia Type: MAC.     - Reason for MAC: straight local not clinically adequate              Consents    Anesthesia Plan(s) and associated risks, benefits, and realistic alternatives discussed. Questions answered and patient/representative(s) expressed understanding.     - Discussed:     - Discussed with:  Patient      - Extended Intubation/Ventilatory Support Discussed: No.      - Patient is DNR/DNI Status: No     Use of blood products discussed: No .     Postoperative Care    Pain management: Multi-modal analgesia.   PONV prophylaxis: Background Propofol Infusion     Comments:               Mckayla Chino MD    I have reviewed the pertinent notes and labs in the chart from the past 30 days and (re)examined the patient.  Any updates or changes from those notes are reflected in this note.                         # Severe Obesity: Estimated body mass index is 48.61 kg/m  as calculated from the following:    Height as of 11/26/24: 1.727 m (5' 8\").    Weight as of 11/26/24: 145 kg (319 lb 11.2 oz).             "

## 2024-11-27 NOTE — OR NURSING
Patient meets criteria for discharge. Daughter went to pull car up front.  Waiting for transport to bring patient to front door.

## 2024-11-27 NOTE — DISCHARGE INSTRUCTIONS
Post-operative Instructions    Ophthalmic Plastic and Reconstructive Surgery  Keven Gomez M.D.  Vanesa Henley M.D.    All instructions apply to the operated eye(s) or eyelid(s)      What to expect after surgery:  There will be some swelling, bruising, and likely a black eye (even into the lower eyelids and cheeks). Also expect crusting and discharge from the eye and/or incisions.   A small amount of surface bleeding is normal for the first 48 hours after surgery.  You may notice some bloody tears for the first few days after surgery. This is normal.  Your eye(s) and eyelid(s) may be painful and tender. This is normal after surgery.     Wound care and personal care:  Apply ice compresses 15 minutes on 15 minutes off while awake for the first 2 days after surgery, then switch to warm compresses 4 times a day until seen by your physician.   For warm packs you can place a cup of dry uncooked rice in a clean cotton sock. Place sock in microwave 30 seconds to one minute. Next place the warm sock into a plastic bag and wrap the bag with clean warm wet washcloth and place over operated eye.    You may shower or wash your hair the day after surgery. Do not bathe or go swimming for 1 week to prevent contamination of your wounds.    Activity restrictions and driving:  Avoid heavy lifting, bending, exercise or strenuous activity for 1 week after surgery.  You may resume other activities and return to work as tolerated.    Medications:  Restart all your regular home medications and eye drops today. If you take Plavix or Aspirin on a regular basis, wait for 3 days after your surgery before restarting these in order to decrease the risk of bleeding complications.  Avoid aspirin and aspirin-like medications (Motrin, Aleve, Ibuprofen, Mandi-Hammond etc) for 5 days to reduce the risk of bleeding. You may take Tylenol (acetaminophen) for pain.  In addition to your home medications, take the following post-operative medications  as prescribed by your physician:  Apply antibiotic ointment (erythromycin) to all sutures three times a day, and into the operated eye(s) at night.   Instill eye drops (Maxitrol) four times a day until the bottle finished.     Contact information and follow-up:  Return to the Eye Clinic for a follow-up appointment with your physician as scheduled. If no appointment has been scheduled, call [phone] for an appointment with Dr. Gomez within 1 to 2 weeks from your date of surgery.  -     If your post-operative appointment is a telephone appointment, please email a few photos of your eye(s) or other operative site(s) to [email or web address] prior to your appointment.    For severe pain, bleeding, or loss of vision, call the Eye Clinic at [phone].  After hours or on weekends and holidays, call [phone] and ask to speak with the ophthalmologist on call.

## 2024-11-27 NOTE — OP NOTE
PREOPERATIVE DIAGNOSIS: Right lower eyelid ectropion. Right lower lid lesion.   POSTOPERATIVE DIAGNOSIS: Right lower eyelid ectropion. Right lower lid lesion.   PROCEDURE:  Right lower eyelid ectropion repair by tarsal strip procedure. Right lower lid lesion excision.   ANESTHESIA: Monitored with local infiltration of 1% lidocaine with epinephrine [id number]  SURGEON: Gail Gomez MD.  ASSISTANT: Vanesa Henley MD, YOJANA and  Wu Han MD    COMPLICATIONS: None.   ESTIMATED BLOOD LOSS: Less than 5 mL.   SPECIMEN: Right lower lid lesion in formalin  HISTORY: Arthur Garcia  presented with tearing  due to lower lid ectropion. After the risks, benefits and alternatives to the proposed procedure were explained, informed consent was obtained.   DESCRIPTION OF PROCEDURE: Arthur Garcia was brought to the operating room and placed supine on the operating table. IV sedation was given. The lower lids and lateral canthal areas were infiltrated with local anesthetic. The area was prepped and draped in the typical fashion. Attention was directed to the right side. An 8 mm lateral canthal incision was made with a 15 blade and dissection carried down to the orbicularis with high temperature cautery. Lateral canthotomy and inferior cantholysis was performed with the cautery. A lateral tarsal strip was fashioned with the high temperature cautery and the justino scissors. Biopsy of the lesion was performed with Justino scissors.  The tarsal strip was secured to the lateral orbital rim periosteum with a double-armed 5-0 PDS suture in a horizontal mattress fashion. Lateral canthal angle was closed with a gray line to gray line suture of 6-0 Vicryl tied internally. Skin was closed with interrupted 6-0 plain gut sutures.  The patient tolerated the procedure well.  Antibiotic ointment was applied to the incisions. Arthur Garcia left the operating room in stable condition.   GAIL GOMEZ MD

## 2024-11-27 NOTE — ANESTHESIA CARE TRANSFER NOTE
Patient: Arthur Garcia    Procedure: Procedure(s):  REPAIR, ECTROPION, RIGHT LOWER  EYELID RIGHT LOWER EYELID BIOPSY       Diagnosis: Mechanical ectropion of right lower eyelid [H02.122]  Diagnosis Additional Information: No value filed.    Anesthesia Type:   MAC     Note:    Oropharynx: oropharynx clear of all foreign objects and spontaneously breathing  Level of Consciousness: awake  Oxygen Supplementation: room air    Independent Airway: airway patency satisfactory and stable  Dentition: dentition unchanged  Vital Signs Stable: post-procedure vital signs reviewed and stable  Report to RN Given: handoff report given  Patient transferred to: Phase II    Handoff Report: Identifed the Patient, Identified the Reponsible Provider, Reviewed the pertinent medical history, Discussed the surgical course, Reviewed Intra-OP anesthesia mangement and issues during anesthesia, Set expectations for post-procedure period and Allowed opportunity for questions and acknowledgement of understanding      Vitals:  Vitals Value Taken Time   /71 11/27/24 1212   Temp 36.5  C (97.7  F) 11/27/24 1212   Pulse     Resp 18 11/27/24 1212   SpO2 96 % 11/27/24 1224   Vitals shown include unfiled device data.    Electronically Signed By: MARITZA CARDOSO CRNA  November 27, 2024  12:25 PM

## 2024-11-27 NOTE — ANESTHESIA POSTPROCEDURE EVALUATION
Patient: Arthur Garcia    Procedure: Procedure(s):  REPAIR, ECTROPION, RIGHT LOWER  EYELID RIGHT LOWER EYELID BIOPSY       Anesthesia Type:  MAC    Note:  Disposition: Outpatient   Postop Pain Control: Uneventful            Sign Out: Well controlled pain   PONV: No   Neuro/Psych: Uneventful            Sign Out: Acceptable/Baseline neuro status   Airway/Respiratory: Uneventful            Sign Out: Acceptable/Baseline resp. status   CV/Hemodynamics: Uneventful            Sign Out: Acceptable CV status; No obvious hypovolemia; No obvious fluid overload   Other NRE: NONE   DID A NON-ROUTINE EVENT OCCUR? No       Last vitals:  Vitals Value Taken Time   /73 11/27/24 1250   Temp 36.5  C (97.7  F) 11/27/24 1250   Pulse     Resp 12 11/27/24 1250   SpO2 99 % 11/27/24 1250       Electronically Signed By: Mckayla Chino MD  November 27, 2024  1:40 PM

## 2024-12-09 ENCOUNTER — OFFICE VISIT (OUTPATIENT)
Dept: OPHTHALMOLOGY | Facility: CLINIC | Age: 79
End: 2024-12-09
Payer: COMMERCIAL

## 2024-12-09 DIAGNOSIS — Z98.890 POSTOPERATIVE EYE STATE: Primary | ICD-10-CM

## 2024-12-09 PROCEDURE — 99024 POSTOP FOLLOW-UP VISIT: CPT | Mod: GC | Performed by: OPHTHALMOLOGY

## 2024-12-09 ASSESSMENT — TONOMETRY
OS_IOP_MMHG: 16
OD_IOP_MMHG: 10
IOP_METHOD: ICARE

## 2024-12-09 ASSESSMENT — VISUAL ACUITY
OD_SC: 20/30-/+2
METHOD: SNELLEN - LINEAR
OS_CC: 20/30-2
OD_CC: 20/30-2

## 2024-12-09 NOTE — PROGRESS NOTES
"Chief Complaints and History of Present Illnesses   Patient presents with    Post Op (Ophthalmology) Right Eye     Chief Complaint(s) and History of Present Illness(es)     Post Op (Ophthalmology) Right Eye    In right eye.           Comments    S/p REPAIR, ECTROPION, RIGHT LOWER  EYELID RIGHT LOWER EYELID BIOPSY   11/27/2024    He states his right eye is a little mattery, but overall he thinks it is   doing okay.  He's not having any pain in his eye.     Oc meds:  Moxifloxacin - using \"when I feel like it\" right eye,  he states he   doesn't feel like his eye has needed it and doesn't want to run out   Prednisolone three or four times/day \"at least\" right eye   EES gala?  At bedtime right eye     Jeannette Hector, SRIRAM 12:58 PM 12/09/2024      Patient is having some blurry vision but not in the operative eye.                      Assessment & Plan     Arthur Garcia is a 79 year old male with the following diagnoses:   1. Postoperative eye state         Arthur Garcia is 2 weeks status post RLL ectropion repair and lower eyelid biopsy.  The incision(s) is healing well.  The lid(s)  are  in excellent position.    Surgical pathology: collagenous soft tissue and conjunctival epithelium. No melanoma identified.     I have recommended:  - Continue antibiotic ointment or bland lubricating ointment (eg vaseline or aquaphor) to the incision site BID.  - Massage along the incision BID.  - Warm soaks QID until all edema and ecchymoses resolve  - Return to clinic in 6-8 weeks         Wu aHn MD  PGY-2 Resident  Department of Ophthalmology  December 9, 2024 1:12 PM    Attending Physician Attestation:  I have seen and examined this patient with the resident .  I have confirmed and edited as necessary the chief complaint(s), history of present illness, review of systems, relevant history, and examination findings as documented by others.  I have personally reviewed the relevant tests, images, and reports as documented " above.  I have confirmed and edited as necessary the assessment and plan and agree with this note.    - Keven Gomez MD 1:27 PM 12/9/2024

## 2024-12-10 ENCOUNTER — TELEPHONE (OUTPATIENT)
Dept: DERMATOLOGY | Facility: CLINIC | Age: 79
End: 2024-12-10

## 2024-12-10 NOTE — TELEPHONE ENCOUNTER
Left Voicemail (1st Attempt) and Sent Mychart (1st Attempt) for the patient to call back and schedule the following:    Appointment type: Return Dermatology   Provider: Lolis Szymanski PA-C   Return date: around 5/1/2025  Specialty phone number: [phone]  Additional appointment(s) needed: n/a  Additonal Notes: WQ appointment request       Olivia Smart on 12/10/2024 at 8:32 AM

## 2024-12-17 ENCOUNTER — TELEPHONE (OUTPATIENT)
Dept: DERMATOLOGY | Facility: CLINIC | Age: 79
End: 2024-12-17

## 2024-12-17 NOTE — TELEPHONE ENCOUNTER
Left Voicemail (2nd Attempt) for the patient to call back and schedule the following:    Appointment type: Return Dermatology   Provider: Lolis Szymanski PA-C   Return date: around 5/1/2025  Specialty phone number: [phone]  Additional appointment(s) needed: n/a  Additonal Notes: WQ appointment request      Max attempts made - LVM x2, sent MyC       Olivia Smart on 12/17/2024 at 11:20 AM

## 2025-01-14 DIAGNOSIS — Z98.890 POSTOPERATIVE EYE STATE: ICD-10-CM

## 2025-01-14 RX ORDER — ERYTHROMYCIN 5 MG/G
OINTMENT OPHTHALMIC
Qty: 3.5 G | Refills: 1 | Status: SHIPPED | OUTPATIENT
Start: 2025-01-14

## 2025-01-17 ENCOUNTER — HOSPITAL ENCOUNTER (EMERGENCY)
Facility: CLINIC | Age: 80
Discharge: HOME | End: 2025-01-17
Attending: EMERGENCY MEDICINE | Admitting: EMERGENCY MEDICINE
Payer: COMMERCIAL

## 2025-01-17 ENCOUNTER — APPOINTMENT (OUTPATIENT)
Dept: CT IMAGING | Facility: CLINIC | Age: 80
End: 2025-01-17
Attending: EMERGENCY MEDICINE
Payer: COMMERCIAL

## 2025-01-17 VITALS
OXYGEN SATURATION: 99 % | TEMPERATURE: 98.5 F | WEIGHT: 300 LBS | DIASTOLIC BLOOD PRESSURE: 99 MMHG | HEIGHT: 68 IN | HEART RATE: 62 BPM | SYSTOLIC BLOOD PRESSURE: 149 MMHG | RESPIRATION RATE: 17 BRPM | BODY MASS INDEX: 45.47 KG/M2

## 2025-01-17 DIAGNOSIS — R10.9 LEFT FLANK PAIN: ICD-10-CM

## 2025-01-17 LAB
ALBUMIN SERPL BCG-MCNC: 3.9 G/DL (ref 3.5–5.2)
ALBUMIN UR-MCNC: NEGATIVE MG/DL
ALP SERPL-CCNC: 63 U/L (ref 40–150)
ALT SERPL W P-5'-P-CCNC: 46 U/L (ref 0–70)
ANION GAP SERPL CALCULATED.3IONS-SCNC: 10 MMOL/L (ref 7–15)
APPEARANCE UR: CLEAR
AST SERPL W P-5'-P-CCNC: 27 U/L (ref 0–45)
BASOPHILS # BLD AUTO: 0 10E3/UL (ref 0–0.2)
BASOPHILS NFR BLD AUTO: 1 %
BILIRUB SERPL-MCNC: 0.6 MG/DL
BILIRUB UR QL STRIP: NEGATIVE
BUN SERPL-MCNC: 24.6 MG/DL (ref 8–23)
CALCIUM SERPL-MCNC: 9.5 MG/DL (ref 8.8–10.4)
CHLORIDE SERPL-SCNC: 107 MMOL/L (ref 98–107)
COLOR UR AUTO: YELLOW
CREAT SERPL-MCNC: 1.9 MG/DL (ref 0.67–1.17)
EGFRCR SERPLBLD CKD-EPI 2021: 36 ML/MIN/1.73M2
EOSINOPHIL # BLD AUTO: 0.1 10E3/UL (ref 0–0.7)
EOSINOPHIL NFR BLD AUTO: 2 %
ERYTHROCYTE [DISTWIDTH] IN BLOOD BY AUTOMATED COUNT: 14.4 % (ref 10–15)
GLUCOSE SERPL-MCNC: 82 MG/DL (ref 70–99)
GLUCOSE UR STRIP-MCNC: NEGATIVE MG/DL
HCO3 SERPL-SCNC: 24 MMOL/L (ref 22–29)
HCT VFR BLD AUTO: 39.8 % (ref 40–53)
HGB BLD-MCNC: 14.6 G/DL (ref 13.3–17.7)
HGB UR QL STRIP: NEGATIVE
HYALINE CASTS: 3 /LPF
IMM GRANULOCYTES # BLD: 0 10E3/UL
IMM GRANULOCYTES NFR BLD: 1 %
KETONES UR STRIP-MCNC: NEGATIVE MG/DL
LEUKOCYTE ESTERASE UR QL STRIP: NEGATIVE
LYMPHOCYTES # BLD AUTO: 1.7 10E3/UL (ref 0.8–5.3)
LYMPHOCYTES NFR BLD AUTO: 27 %
MCH RBC QN AUTO: 33 PG (ref 26.5–33)
MCHC RBC AUTO-ENTMCNC: 36.7 G/DL (ref 31.5–36.5)
MCV RBC AUTO: 90 FL (ref 78–100)
MONOCYTES # BLD AUTO: 0.5 10E3/UL (ref 0–1.3)
MONOCYTES NFR BLD AUTO: 9 %
MUCOUS THREADS #/AREA URNS LPF: PRESENT /LPF
NEUTROPHILS # BLD AUTO: 3.8 10E3/UL (ref 1.6–8.3)
NEUTROPHILS NFR BLD AUTO: 61 %
NITRATE UR QL: NEGATIVE
NRBC # BLD AUTO: 0 10E3/UL
NRBC BLD AUTO-RTO: 0 /100
PH UR STRIP: 6 [PH] (ref 5–7)
PLATELET # BLD AUTO: 154 10E3/UL (ref 150–450)
POTASSIUM SERPL-SCNC: 4 MMOL/L (ref 3.4–5.3)
PROT SERPL-MCNC: 6.5 G/DL (ref 6.4–8.3)
RBC # BLD AUTO: 4.42 10E6/UL (ref 4.4–5.9)
RBC URINE: <1 /HPF
SODIUM SERPL-SCNC: 141 MMOL/L (ref 135–145)
SP GR UR STRIP: 1.02 (ref 1–1.03)
SQUAMOUS EPITHELIAL: <1 /HPF
UROBILINOGEN UR STRIP-MCNC: NORMAL MG/DL
WBC # BLD AUTO: 6.2 10E3/UL (ref 4–11)
WBC URINE: 1 /HPF

## 2025-01-17 PROCEDURE — 36415 COLL VENOUS BLD VENIPUNCTURE: CPT | Performed by: EMERGENCY MEDICINE

## 2025-01-17 PROCEDURE — 74176 CT ABD & PELVIS W/O CONTRAST: CPT

## 2025-01-17 PROCEDURE — 85041 AUTOMATED RBC COUNT: CPT | Performed by: EMERGENCY MEDICINE

## 2025-01-17 PROCEDURE — 99284 EMERGENCY DEPT VISIT MOD MDM: CPT | Mod: 25 | Performed by: EMERGENCY MEDICINE

## 2025-01-17 PROCEDURE — 99284 EMERGENCY DEPT VISIT MOD MDM: CPT | Performed by: EMERGENCY MEDICINE

## 2025-01-17 PROCEDURE — 85004 AUTOMATED DIFF WBC COUNT: CPT | Performed by: EMERGENCY MEDICINE

## 2025-01-17 PROCEDURE — 82247 BILIRUBIN TOTAL: CPT | Performed by: EMERGENCY MEDICINE

## 2025-01-17 PROCEDURE — 81001 URINALYSIS AUTO W/SCOPE: CPT | Performed by: EMERGENCY MEDICINE

## 2025-01-17 PROCEDURE — 82947 ASSAY GLUCOSE BLOOD QUANT: CPT | Performed by: EMERGENCY MEDICINE

## 2025-01-17 RX ORDER — HYDROMORPHONE HYDROCHLORIDE 1 MG/ML
0.5 INJECTION, SOLUTION INTRAMUSCULAR; INTRAVENOUS; SUBCUTANEOUS EVERY 30 MIN PRN
Status: DISCONTINUED | OUTPATIENT
Start: 2025-01-17 | End: 2025-01-17 | Stop reason: HOSPADM

## 2025-01-17 RX ORDER — ONDANSETRON 2 MG/ML
4 INJECTION INTRAMUSCULAR; INTRAVENOUS EVERY 30 MIN PRN
Status: DISCONTINUED | OUTPATIENT
Start: 2025-01-17 | End: 2025-01-17 | Stop reason: HOSPADM

## 2025-01-17 RX ORDER — OXYCODONE HYDROCHLORIDE 5 MG/1
5 TABLET ORAL EVERY 6 HOURS PRN
Qty: 12 TABLET | Refills: 0 | Status: SHIPPED | OUTPATIENT
Start: 2025-01-17 | End: 2025-01-20

## 2025-01-17 ASSESSMENT — ACTIVITIES OF DAILY LIVING (ADL)
ADLS_ACUITY_SCORE: 42
ADLS_ACUITY_SCORE: 42

## 2025-01-17 ASSESSMENT — COLUMBIA-SUICIDE SEVERITY RATING SCALE - C-SSRS
6. HAVE YOU EVER DONE ANYTHING, STARTED TO DO ANYTHING, OR PREPARED TO DO ANYTHING TO END YOUR LIFE?: NO
2. HAVE YOU ACTUALLY HAD ANY THOUGHTS OF KILLING YOURSELF IN THE PAST MONTH?: NO
1. IN THE PAST MONTH, HAVE YOU WISHED YOU WERE DEAD OR WISHED YOU COULD GO TO SLEEP AND NOT WAKE UP?: NO

## 2025-01-17 NOTE — DISCHARGE INSTRUCTIONS
Your labs and CT scan were reassuring.  You do not have an active kidney stone that is passing.  You have a few small ones in the kidney itself.  The cystic lesions in your kidney are relatively unchanged.  Your labs are reassuring.  Your renal function is similar to what it was before.

## 2025-01-17 NOTE — ED PROVIDER NOTES
History     Chief Complaint   Patient presents with    Flank Pain     HPI  Arthur Garcia is a 79 year old male who presents to the emergency department via private vehicle secondary to left-sided flank pain.  He has had this off and on for quite some time and usually takes hydrocodone and Tylenol at home.  He was taking his usual medication and it did not relieve his symptoms.  [facility]  He states that he has a small cyst on his left kidney and he thinks that that is what is causing the pain.  It is located in the left lower flank and radiates to the left buttocks.  It does not go all the way down the leg.  It does not wrap around to the front.  He does have a history of ureterolithiasis and was seen previously at [facility] for that.  He has not had any blood in his urine, increased urinary frequency, burning on urination, difficulty passing urine.  He has not had fever chills nausea vomiting or diarrhea.  He has not had constipation.  He urinated twice this morning already.  He did not note any blood.  No chest pain or shortness of breath.    Most recent CT scan on record:  IMPRESSION: Compared to prior CT from 5/6/2024, the current scan  shows:     1. 9 mm hypodensity in the pancreatic tail, similar to more  conspicuous compared to prior, indeterminate, differential include  IPMN, focal fatty infiltration versus others/neoplastic. Recommend  pancreatic protocol MRI for further evaluation.     2. Similar sized indeterminate medial left renal exophytic lesion and  subcentimeter posterior left renal exophytic lesion, differential  include complex cyst versus renal solid neoplasm. These may also be  evaluated at the time of abdominal MRI.     3. Hepatic steatosis. Similar sized too small to characterize hepatic  hypodensity, this may also be further evaluated at the time of  abdominal MRI.     4. Similar sized left thyroid nodule measuring up to 1.5 cm. Consider  ultrasound thyroid for further  evaluation     5. Additional findings are similar to prior as described above  including subcentimeter pulmonary nodules, nodular adrenal gland  thickening, consider attention on follow-up.     6. Mild prostatomegaly. Mild apparent thickening of the urinary  bladder possibly secondary to underdistention, consider correlation  for chronic obstructive outflow changes and/or urinalysis to evaluate  for cystitis, if clinically desired  Allergies:  Allergies   Allergen Reactions    Atorvastatin Muscle Pain (Myalgia)    Codeine Hives    Simvastatin Muscle Pain (Myalgia)     Other reaction(s): Muscle pain, Joint pain    Adhesive Tape Rash     Mepliex with border.    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     Priority: Medium    Obesity, morbid, BMI 40.0-49.9 (H) 01/04/2023     Priority: Medium    Chronic kidney disease, stage 3b (H) 01/04/2023     Priority: Medium    CARDIOVASCULAR SCREENING; LDL GOAL LESS THAN 130 02/19/2014     Priority: Medium        Past Medical History:    Past Medical History:   Diagnosis Date    Arthritis     Depression     Disorder of lipoprotein and lipid metabolism     Gout     Hypertension     Sleep apnea        Past Surgical History:    Past Surgical History:   Procedure Laterality Date    APPENDECTOMY  01/01/2000    BIOPSY NODE SENTINEL Right 4/5/2023    Procedure: SENTINAL LYMPH  NODE BIOPSY;  Surgeon: Jose Guadalupe Warner MD;  Location: [facility]    CHOLECYSTECTOMY  1999 or 2000    EXCISE LESION CONJUNCTIVAL Right 2/13/2023    Procedure: wide local excision of right conjuntivia lesion;   Surgeon: Keven Gomez MD;  Location: [facility]    EXCISE LESION EYELID Right 11/22/2023    Procedure: RIGHT UPPER EYELID LESION EXCISION;  Surgeon: Keven Gomez MD;  Location: [facility]    EXCISE TUMOR EYELID Right 4/5/2023    Procedure: Wide Excision of Right Lower eyelid melanoma;  Surgeon: Keven Gomez MD;  Location: [facility]    PLACEMENT, AMNIOTIC MEMBRANE GRAFT Right 2/13/2023    Procedure: PLACEMENT, AMNIOTIC MEMBRANE GRAFT;  Surgeon: Keven Gomez MD;  Location: [facility]    FL TRABECULOPLASTY BY LASER SURGERY      RECONSTRUCT EYELID Right 6/28/2023    Procedure: SECOND STAGE RECONSTRUCTION, RIGHT LOWER EYELID;  Surgeon: Keven Gomez MD;  Location: [facility]    RECONSTRUCT EYELID WITH POSTAURICULAR SKIN GRAFT Right 4/19/2023    Procedure: right RECONSTRUCTION EYELID, MEDIAL AND LATERAL BIOPSY;  Surgeon: Keven Gomez MD;  Location: [facility]    REPAIR ECTROPION Right 11/22/2023    Procedure: Right lower eyelid ectropion repair by tarsal strip procedure;  Surgeon: Keven Gomez MD;  [facility]: [facility]    REPAIR ECTROPION Right 11/27/2024    Procedure: REPAIR, ECTROPION, RIGHT LOWER  EYELID RIGHT LOWER EYELID BIOPSY;  Surgeon: Keven Gomez MD;  Location: [facility]       Family History:    Family History   Problem Relation Age of Onset    Glaucoma Mother     Retinal detachment Father     Macular Degeneration No family hx of     Melanoma No family hx of     Skin Cancer No family hx of        Social History:  Marital Status:   [4]  Social History     Tobacco Use    Smoking status: Never    Smokeless tobacco: Never   Vaping Use    Vaping status: Never Used   Substance Use Topics    Alcohol use: No    Drug use: No        Medications:    acetaminophen (TYLENOL) 325 MG tablet  aspirin (ASA) 81 MG chewable tablet  atenolol (TENORMIN) 25 MG tablet  brimonidine (ALPHAGAN-P) 0.15 % ophthalmic solution  buPROPion (WELLBUTRIN) 75 MG tablet  buPROPion (WELLBUTRIN) 75 MG tablet  celecoxib (CELEBREX) 100 MG  "capsule  cholecalciferol 50 MCG (2000 UT) tablet  cycloSPORINE (RESTASIS) 0.05 % ophthalmic emulsion  dexamethasone (DECADRON) 0.1 % ophthalmic suspension  DULoxetine (CYMBALTA) 60 MG capsule  erythromycin (ROMYCIN) 5 MG/GM ophthalmic ointment  febuxostat (ULORIC) 80 MG TABS tablet  flunisolide (NASALIDE) 25 MCG/ACT (0.025%) SOLN spray  fluticasone (FLONASE) 50 MCG/ACT nasal spray  ketoconazole (NIZORAL) 2 % external shampoo  ketorolac (ACULAR) 0.5 % ophthalmic solution  loratadine (CLARITIN) 10 MG tablet  melatonin 3 MG tablet  methocarbamol (ROBAXIN) 750 MG tablet  Multiple Vitamin (THERA-TABS) TABS  neomycin-polymixin-dexAMETHasone (MAXITROL) 0.1 % ophthalmic suspension  neomycin-polymixin-dexamethasone (MAXITROL) 0.1 % ophthalmic suspension  oxyCODONE (ROXICODONE) 5 MG tablet  polyethylene glycol-propylene glycol (SYSTANE ULTRA) 0.4-0.3 % SOLN ophthalmic solution  pravastatin (PRAVACHOL) 40 MG tablet  prednisoLONE acetate (PRED FORTE) 1 % ophthalmic suspension  predniSONE (DELTASONE) 20 MG tablet  Semaglutide-Weight Management (WEGOVY) 2.4 MG/0.75ML pen  tamsulosin (FLOMAX) 0.4 MG capsule  traZODone (DESYREL) 100 MG tablet  triamterene-hydrochlorothiazide (MAXZIDE) 75-50 MG per tablet  vitamin C (ASCORBIC ACID) 500 MG tablet  Witch Hazel (MEDICATED WIPES) 50 % PADS          Review of Systems   All other systems reviewed and are negative.      Physical Exam   BP: (!) 149/99  Pulse: 62  Temp: 98.5  F (36.9  C)  Resp: 17  Height: 172.7 cm (5' 8\")  Weight: 136.1 kg (300 lb)  SpO2: 99 %      Physical Exam  Vitals and nursing note reviewed.   Constitutional:       General: He is not in acute distress.     Appearance: He is well-developed. He is not diaphoretic.   HENT:      Head: Normocephalic and atraumatic.      Nose: Nose normal. No congestion.      Mouth/Throat:      Mouth: Mucous membranes are moist.      Pharynx: Oropharynx is clear.   Eyes:      General: No scleral icterus.     Extraocular Movements: " Extraocular movements intact.      Conjunctiva/sclera: Conjunctivae normal.      Pupils: Pupils are equal, round, and reactive to light.   Cardiovascular:      Rate and Rhythm: Normal rate and regular rhythm.   Pulmonary:      Effort: Pulmonary effort is normal.      Breath sounds: Normal breath sounds.   Abdominal:      General: Abdomen is flat.   Musculoskeletal:         General: No swelling, tenderness, deformity or signs of injury. Normal range of motion.      Cervical back: Normal range of motion and neck supple.      Comments: Straight leg raise is normal.   Lymphadenopathy:      Cervical: No cervical adenopathy.   Skin:     General: Skin is warm and dry.      Capillary Refill: Capillary refill takes less than 2 seconds.      Findings: No rash.      Comments: No bruising over the left flank.   Neurological:      General: No focal deficit present.      Mental Status: He is alert and oriented to person, place, and time.      Cranial Nerves: No cranial nerve deficit.      Sensory: No sensory deficit.      Coordination: Coordination normal.   Psychiatric:         Mood and Affect: Mood normal.         ED Course        Procedures             Results for orders placed or performed during the hospital encounter of 01/17/25 (from the past 24 hours)   CBC with platelets differential    Narrative    The following orders were created for panel order CBC with platelets differential.  Procedure                               Abnormality         Status                     ---------                               -----------         ------                     CBC with platelets and d...[[id number]]  Abnormal            Final result                 Please view results for these tests on the individual orders.   Comprehensive metabolic panel   Result Value Ref Range    Sodium 141 135 - 145 mmol/L    Potassium 4.0 3.4 - 5.3 mmol/L    Carbon Dioxide (CO2) 24 22 - 29 mmol/L    Anion Gap 10 7 - 15 mmol/L    Urea Nitrogen 24.6 (H) 8.0  - 23.0 mg/dL    Creatinine 1.90 (H) 0.67 - 1.17 mg/dL    GFR Estimate 36 (L) >60 mL/min/1.73m2    Calcium 9.5 8.8 - 10.4 mg/dL    Chloride 107 98 - 107 mmol/L    Glucose 82 70 - 99 mg/dL    Alkaline Phosphatase 63 40 - 150 U/L    AST 27 0 - 45 U/L    ALT 46 0 - 70 U/L    Protein Total 6.5 6.4 - 8.3 g/dL    Albumin 3.9 3.5 - 5.2 g/dL    Bilirubin Total 0.6 <=1.2 mg/dL   CBC with platelets and differential   Result Value Ref Range    WBC Count 6.2 4.0 - 11.0 10e3/uL    RBC Count 4.42 4.40 - 5.90 10e6/uL    Hemoglobin 14.6 13.3 - 17.7 g/dL    Hematocrit 39.8 (L) 40.0 - 53.0 %    MCV 90 78 - 100 fL    MCH 33.0 26.5 - 33.0 pg    MCHC 36.7 (H) 31.5 - 36.5 g/dL    RDW 14.4 10.0 - 15.0 %    Platelet Count 154 150 - 450 10e3/uL    % Neutrophils 61 %    % Lymphocytes 27 %    % Monocytes 9 %    % Eosinophils 2 %    % Basophils 1 %    % Immature Granulocytes 1 %    NRBCs per 100 WBC 0 <1 /100    Absolute Neutrophils 3.8 1.6 - 8.3 10e3/uL    Absolute Lymphocytes 1.7 0.8 - 5.3 10e3/uL    Absolute Monocytes 0.5 0.0 - 1.3 10e3/uL    Absolute Eosinophils 0.1 0.0 - 0.7 10e3/uL    Absolute Basophils 0.0 0.0 - 0.2 10e3/uL    Absolute Immature Granulocytes 0.0 <=0.4 10e3/uL    Absolute NRBCs 0.0 10e3/uL   CT Abdomen Pelvis w/o Contrast    Narrative    EXAM: CT ABDOMEN PELVIS W/O CONTRAST  LOCATION: [facility]  DATE: 1/17/2025    INDICATION: flank pain left  COMPARISON: 11/15/2024  TECHNIQUE: CT scan of the abdomen and pelvis was performed without IV contrast. Multiplanar reformats were obtained. Dose reduction techniques were used.  CONTRAST: None.    FINDINGS:   LOWER CHEST: Pleural calcifications at the lung base, consistent with prior asbestos exposure.    HEPATOBILIARY: No focal lesions in the liver on noncontrast CT. Cholecystectomy. No biliary ductal dilatation.    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    SPLEEN:  Normal.    ADRENAL GLANDS: Normal.    KIDNEYS/BLADDER: No right renal calculi or hydronephrosis. A few small nonobstructing left renal calculi measuring 1-2 mm. No left ureteral calculi or left-sided hydronephrosis.  Both kidneys have several simple cysts as well as hyperdense lesions that are indeterminate, possibly cysts containing proteinaceous or hemorrhagic material. The largest of these is a stable 2.7 cm hyperdense lesion in the interpolar region of the left   kidney. No urinary bladder calculi.    BOWEL: No small bowel or colonic obstruction or inflammatory changes. Normal appendix.    LYMPH NODES: No lymphadenopathy.    VASCULATURE: No abdominal aortic aneurysm.    PELVIC ORGANS: No pelvic masses.    MUSCULOSKELETAL: Mild degenerative changes in the spine. No suspicious lesions in the bones.      Impression    IMPRESSION:   1.  No acute findings in the abdomen and pelvis.  2.  Few 1-2 mm nonobstructing left renal calculi. No hydronephrosis bilaterally.  3.  Stable indeterminate hyperdense lesions in the kidneys bilaterally, the largest being a 2.7 cm lesion in the left kidney. These are likely cysts containing hemorrhagic or proteinaceous material. Consider nonemergent follow-up renal MRI.     UA with Microscopic reflex to Culture    Specimen: Urine, Clean Catch   Result Value Ref Range    Color Urine Yellow Colorless, Straw, Light Yellow, Yellow    Appearance Urine Clear Clear    Glucose Urine Negative Negative mg/dL    Bilirubin Urine Negative Negative    Ketones Urine Negative Negative mg/dL    Specific Gravity Urine 1.020 1.003 - 1.035    Blood Urine Negative Negative    pH Urine 6.0 5.0 - 7.0    Protein Albumin Urine Negative Negative mg/dL    Urobilinogen Urine Normal Normal, 2.0 mg/dL    Nitrite Urine Negative Negative    Leukocyte Esterase Urine Negative Negative    Mucus Urine Present (A) None Seen /LPF    RBC Urine <1 <=2 /HPF    WBC Urine 1 <=5 /HPF    Squamous Epithelials Urine <1 <=1 /HPF     Hyaline Casts Urine 3 (H) <=2 /LPF    Narrative    Urine Culture not indicated       Medications   ondansetron (ZOFRAN) injection 4 mg (has no administration in time range)   HYDROmorphone (PF) (DILAUDID) injection 0.5 mg (has no administration in time range)       Assessments & Plan (with Medical Decision Making)  Left flank pain  CT scan is unchanged from previous.  No ureterolithiasis.  He does have the cystic lesions in his kidneys.  Patient's symptoms had improved during his emergency department visit.  Renal function is at baseline with chronic renal insufficiency otherwise labs are reassuring.  Patient is discharged home in improved condition.  He was given a small prescription for oxycodone per her request.  Patient has hydrocodone/Norco at home and I advised him to use either 1 I think he only has 2 tablets of the Norco at home.  [facility]  A prescription was sent to his pharmacy in [place].  Patient was discharged home.  Return to ER precautions and follow-up precautions discussed.  All questions answered prior to discharge.     I have reviewed the nursing notes.    I have reviewed the findings, diagnosis, plan and need for follow up with the patient.          New Prescriptions    OXYCODONE (ROXICODONE) 5 MG TABLET    Take 1 tablet (5 mg) by mouth every 6 hours as needed for pain.       Final diagnoses:   Left flank pain       1/17/2025   [facility] EMERGENCY DEPT       Rik Horn MD  01/17/25 [id number]

## 2025-01-17 NOTE — ED TRIAGE NOTES
States left side flank pain down to buttock muscle per pt report since before Thanksgiving and today was severe he could not bear the pain.  States he has a growth on his kidney and [facility]. [facility] and will be out of pain pills before that appt

## 2025-02-11 ENCOUNTER — TELEPHONE (OUTPATIENT)
Dept: OPHTHALMOLOGY | Facility: CLINIC | Age: 80
End: 2025-02-11

## 2025-02-11 NOTE — TELEPHONE ENCOUNTER
Spoke with patient regarding scheduling for missed POST-OP. Scheduled patient accordingly for a POST-OP within 90 Days from date of Surgery. Patient is aware of appointment details. Patient is having a difficult time walking. DOS 11/27/24.-Appt Per PT

## 2025-02-21 ENCOUNTER — TRANSFERRED RECORDS (OUTPATIENT)
Dept: HEALTH INFORMATION MANAGEMENT | Facility: CLINIC | Age: 80
End: 2025-02-21

## 2025-05-13 NOTE — PROGRESS NOTES
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  Patient presented with dryness, erythema, photophobia, and pain in the right eye not relieved by eye drops, artificial tears and ointment.  There was also a presumed conjunctival pyogenic granuloma in the right eye.  Patient underwent right eye conjunctival biopsy 12/05/2022, that revealed a ulcerated, nodular melanoma with a 4.1mm depth of invasion in the right lower eyelid that was positive for SOX10 and PRAME, but negative for AE1/AE3, CD45, ERG, and chromogranin.  Tumor extended to the lateral and deep margins.  There was no lymphovascular or perineural invasion.  There was no microsatellitosis. Tumor infiltrating lymphocytes were present and non-brisk without tumor regression. There were 13 mitoses/mm2.  MRI of brain and orbits 12/19/2022, was negative for intracranial metastases.  The orbits were unremarkable.  FDG PET/CT scan 01/02/2023, was negative for hypermetabolic lesions in the right lower eyelid.  There was no evidence of hypermetabolic lymphadenopathy or metastases.  There was a indeterminate 4 mm mildly enhancing focus in the right parotid gland with SUV max 4.  There was a 1.7 cm, non-FDG avid thyroid nodule. Patient underwent right conjunctiva wide excision and reconstruction with Amnioguard membrane, 02/13/2023, that revealed invasive conjunctival melanoma involving the inferior medial conjunctiva and tarsus.  Surgical margins were involved.  Subsequent wide local excision of the conjunctiva and right lower eyelid and right neck sentinel lymph node biopsy 04/05/2023, revealed residual melanoma in situ adjacent to scar from the prior procedure, contacting the lateral margin.  Additional margins were negative for malignancy.  One right neck sentinel lymph node was negative for metastasis  (0/1 lymph node involved).  There were small capsular Melan-A/Sox10-positive cells, consistent with maryam nevus.  Patient underwent right lower eyelid full-thickness reconstruction with tarsoconjunctival flap and myocutaneous advancement flap 04/19/2023, second stage right lower eyelid reconstruction with Early flap, 06/28/2023, and right lower eyelid ectropion repair with lateral tarsal strip and excision of the right upper eyelid seborrheic keratosis, 11/22/2023. There is no evidence of recurrence or metastases noted on restaging CT neck, chest, abdomen, pelvis (11/15/2024).  Right lower eyelid ectropion, was repaired 11/27/2024, which improved the eye irritation.  Right lower eyelid biopsy revealed collagenous soft tissue and conjunctival epithelium without evidence of melanoma.  Patient has been losing weight since starting semaglutide.  Patient has regurgitation after swallowing febuxostat in the morning, and he stopped taking the medication.  Patient also reports urinary incontinence since the TURP in 2005.  There are no other new symptoms or events since the prior clinic visit (11/21/2024). He denies fever, anorexia, weight loss, headache, visual changes, cough, dyspnea, chest pain, abdominal pain, nausea, vomiting, constipation, diarrhea, bleeding, or bone pain.     PAST HISTORY: Past history was reviewed and unchanged from the clinic note 11/21/2024.     MEDICATIONS:   Current Outpatient Medications   Medication Sig Dispense Refill    acetaminophen (TYLENOL) 325 MG tablet Take 2 tablets (650 mg) by mouth every 4 hours as needed for mild pain 50 tablet 0    atenolol (TENORMIN) 25 MG tablet Take 25 mg by mouth daily Once daily      DULoxetine (CYMBALTA) 60 MG capsule TAKE ONE CAPSULE BY MOUTH EVERY MORNING FOR PAIN/DEPRESSION.      fluticasone (FLONASE) 50 MCG/ACT nasal spray Spray 1 spray into both nostrils as needed.      ketoconazole (NIZORAL) 2 % external shampoo Apply topically three times a week.  Lather in the shower, wait 3-5 minutes before rinsing. 120 mL 11    ketorolac (ACULAR) 0.5 % ophthalmic solution Apply 1 drop to eye.      methocarbamol (ROBAXIN) 750 MG tablet as needed.      pravastatin (PRAVACHOL) 40 MG tablet TAKE ONE TABLET BY MOUTH DAILY FOR CHOLESTEROL      Semaglutide-Weight Management (WEGOVY) 2.4 MG/0.75ML pen Inject 2.4 mg subcutaneously once a week.      tamsulosin (FLOMAX) 0.4 MG capsule TAKE TWO CAPSULES BY MOUTH ONCE A DAY FOR PROSTATE      vitamin C (ASCORBIC ACID) 500 MG tablet Take 500 mg by mouth daily      brimonidine (ALPHAGAN-P) 0.15 % ophthalmic solution 1 drop daily. (Patient not taking: Reported on 5/22/2025)      celecoxib (CELEBREX) 100 MG capsule Take 100 mg by mouth 2 times daily. (Patient not taking: Reported on 5/22/2025)      cycloSPORINE (RESTASIS) 0.05 % ophthalmic emulsion Place 1 drop into both eyes 2 times daily. (Patient not taking: Reported on 5/22/2025)      dexamethasone (DECADRON) 0.1 % ophthalmic suspension Apply 1 drop to eye. (Patient not taking: Reported on 5/22/2025)      febuxostat (ULORIC) 80 MG TABS tablet TAKE ONE TABLET BY MOUTH EVERY MORNING FOR GOUT (Patient not taking: Reported on 5/22/2025)      loratadine (CLARITIN) 10 MG tablet Take 10 mg by mouth as needed. (Patient not taking: Reported on 5/22/2025)      melatonin 3 MG tablet Take 3-6 mg by mouth nightly as needed for sleep (Patient not taking: Reported on 5/22/2025)      Multiple Vitamin (THERA-TABS) TABS Take 1 tablet by mouth (Patient not taking: Reported on 5/22/2025)      neomycin-polymixin-dexAMETHasone (MAXITROL) 0.1 % ophthalmic suspension Please instill into the operative eye(s) four times daily until the bottle is finished. (Patient not taking: Reported on 5/22/2025) 5 mL 0    polyethylene glycol-propylene glycol (SYSTANE ULTRA) 0.4-0.3 % SOLN ophthalmic solution  (Patient not taking: Reported on 5/22/2025)      prednisoLONE acetate (PRED FORTE) 1 % ophthalmic suspension Place 1  "drop into the right eye 4 times daily (Patient not taking: Reported on 5/22/2025)      predniSONE (DELTASONE) 20 MG tablet 40 mg (Patient not taking: Reported on 5/22/2025)      traZODone (DESYREL) 100 MG tablet Take 1 tablet by mouth as needed. (Patient not taking: Reported on 5/22/2025)      triamterene-hydrochlorothiazide (MAXZIDE) 75-50 MG per tablet Take 1 tablet by mouth daily Once daily (Patient not taking: Reported on 5/22/2025)      Witch Hazel (MEDICATED WIPES) 50 % PADS Apply topically. (Patient not taking: Reported on 5/22/2025)         REVIEW OF SYSTEMS: Review of systems reviewed with the patient and otherwise negative except for those detailed above.    PHYSICAL EXAM: BP (!) 162/96 (BP Location: Right arm, Patient Position: Sitting, Cuff Size: Adult Large)   Pulse 72   Temp 98.6  F (37  C) (Oral)   Resp 16   Ht 1.727 m (5' 7.99\")   Wt (!) 139.2 kg (306 lb 12.8 oz)   SpO2 97%   BMI 46.66 kg/m  .  Physical exam was unchanged from prior clinic visit 11/21/2024.  Skin: No erythema or rash.  HEENT: Sclera nonicteric. Oropharynx without lesions or ulceration, mucosa pink and moist.  Nodes: No cervical, supraclavicular, axillary, or inguinal adenopathy.  Lungs: No dullness to percussion.  No rales, wheezes, rhonchi.  Heart: Regular rate and rhythm.  Abdomen: Bowel sounds present.  Soft, nontender, no hepatosplenomegaly or mass.  Extremities: Trace ankle and pedal edema.      LABS REVIEWED:   Component      Latest Ref Rng 1/17/2025  1:21 PM 5/20/2025  9:38 AM   WBC      4.0 - 11.0 10e3/uL  7.5    RBC Count      4.40 - 5.90 10e6/uL  5.00    Hemoglobin      13.3 - 17.7 g/dL  15.8    Hematocrit      40.0 - 53.0 %  43.6    MCV      78 - 100 fL  87    MCH      26.5 - 33.0 pg  31.6    MCHC      31.5 - 36.5 g/dL  36.2    RDW      10.0 - 15.0 %  13.8    Platelet Count      150 - 450 10e3/uL  175    % Neutrophils      %  72    % Lymphocytes      %  18    % Monocytes      %  9    % Eosinophils      %  0    % " Basophils      %  1    % Immature Granulocytes      %  0    NRBC/W      <1 /100  0    Absolute Neutrophil      1.6 - 8.3 10e3/uL  5.4    Absolute Lymphocytes      0.8 - 5.3 10e3/uL  1.3    Absolute Monocytes      0.0 - 1.3 10e3/uL  0.7    Absolute Eosinophils      0.0 - 0.7 10e3/uL  0.0    Absolute Basophils      0.0 - 0.2 10e3/uL  0.0    Absolute Immature Granulocytes      <=0.4 10e3/uL  0.0    Absolute NRBCs      10e3/uL  0.0    Sodium      135 - 145 mmol/L 141  140    Potassium      3.4 - 5.3 mmol/L 4.0  3.2 (L)    Carbon Dioxide (CO2)      22 - 29 mmol/L 24  24    Anion Gap      7 - 15 mmol/L 10  12    Urea Nitrogen      8.0 - 23.0 mg/dL 24.6 (H)  38.4 (H)    Creatinine      0.67 - 1.17 mg/dL 1.90 (H)  1.90 (H)    GFR Estimate      >60 mL/min/1.73m2 36 (L)  35 (L)    Calcium      8.8 - 10.4 mg/dL 9.5  9.9    Chloride      98 - 107 mmol/L 107  104    Glucose      70 - 99 mg/dL 82  63 (L)    Alkaline Phosphatase      40 - 150 U/L 63  80    AST      0 - 45 U/L 27  20    ALT      0 - 70 U/L 46  22    Protein Total      6.4 - 8.3 g/dL 6.5  7.3    Albumin      3.5 - 5.2 g/dL 3.9  4.7    Bilirubin Total      <=1.2 mg/dL 0.6  0.7        IMAGING REVIEWED: CT neck, chest, abdomen, pelvis 05/20/2025, was negative for adenopathy or metastases.    CT Soft Tissue Neck w Contrast    Narrative    CT SOFT TISSUE NECK W CONTRAST 5/20/2025 9:27 AM    History:  Conjunctival melanoma of the right eye.  CT neck, chest,  abdomen, pelvis requested to evaluate for recurrence or metastases.;  Malignant melanoma of conjunctiva, right (H)    From Chart:     ICD-10: Malignant melanoma of conjunctiva, right (H)      Comparison:  PET CT from 1/2/2023 and CT of the neck from 11/15/2024.     Technique: Following intravenous administration of nonionic iodinated  contrast medium, thin section helical CT images were obtained from the  skull base down to the level of the aortic arch.  Axial, coronal and  sagittal reformations were performed with  2-3 mm slice thickness  reconstruction. Images were reviewed in soft tissue, lung and bone  windows.    Contrast: 149 ml isovue 370    Findings:      Evaluation of the mucosal space demonstrates no evident abnormality  in the nasopharynx, oropharynx, hypopharynx or the glottis. The tongue  base appears normal.  The major salivary glands appear unremarkable.  The thyroid gland appears normal.    There is no evident cervical lymphadenopathy. Stable intraparotid  lymph nodes. The fascial spaces in the neck are intact bilaterally.   The major vascular structures in the neck appear unremarkable.    Evaluation of the osseous structures demonstrate no worrisome lytic or  sclerotic lesion. . No overt spinal canal or neuroforaminal stenosis.  The visualized paranasal sinuses are clear.. The visualized mastoid  air cells are clear.. Orbits appear unremarkable  . No ventriculomegaly, abnormal intracranial enhancement, evidence of  intracranial hemorrhage. Gray-white differentiation is preserved.    The visualized airspaces are clear. Pleural calcifications are noted  within the apices bilaterally.      Impression    Impression:  Normal CT study of the neck with contrast.  No evident mass or  adenopathy within the neck.     I have personally reviewed the examination and initial interpretation  and I agree with the findings.    PAOLA FAIRCHILD MD         SYSTEM ID:  [id number]       CT Chest/Abdomen/Pelvis w Contrast    Narrative    EXAMINATION: CT CHEST/ABDOMEN/PELVIS W CONTRAST, 5/20/2025 9:27 AM    TECHNIQUE: Helical CT images from the thoracic inlet through the  symphysis pubis were obtained with intravenous contrast. Contrast  dose: 149 cc [id number]    COMPARISON: CT 1/17/2025 and 11/15/2024    HISTORY: Conjunctival melanoma of the right eye. CT neck, chest,  abdomen, pelvis requested to evaluate for recurrence or metastases.;  Malignant melanoma of conjunctiva, right (H)    FINDINGS:    Chest:    Heart/ Mediastinum: Heart is  within normal limits. No evidence of  central pulmonary embolism. No bulky lymphadenopathy.  Esophagus  appears normal.    Lungs/pleura: The central tracheobronchial tree is patent.  No focal  mass or consolidation.  No suspicious nodules. Unchanged calcified  pleural plaques. A few scattered tiny nodules are also unchanged. No  pneumothorax or pleural effusion.     Chest wall/axilla: No bulky lymphadenopathy..    Abdomen and Pelvis:    Liver: Stable subcentimeter subcapsular probable cyst in segment 5. No  suspicious masses. No hepatic steatosis.     Gallbladder/biliary tree: Gallbladder is surgically absent. No biliary  dilatation.    Spleen: Unremarkable.    Pancreas: Stable subcentimeter hypodensity in the tail of the pancreas  on series 4 image 157.. No mass or ductal dilatation.    Adrenal glands: Unremarkable.    Kidneys: Multiple bilateral renal cysts including a hyperdense cyst in  the medial left kidney, unchanged. No hydronephrosis.    Bowel: Unremarkable. No obstruction. Normal appendix.    Retroperitoneum: Vasculature is grossly unremarkable..  No bulky  lymphadenopathy.    Pelvis: Urinary bladder is unremarkable.  Prostate and seminal  vesicles are within normal limits.    Bones: Moderate multilevel degenerative changes of the spine. No  suspicious lesions.    Soft Tissues: Unremarkable.      Impression    IMPRESSION:    1.  No convincing evidence of metastatic disease in the chest,  abdomen, or pelvis.  2.  Stable subcentimeter hypodense structure in the tail of pancreas.  3.  Stable bilateral renal cysts including a probable hyperdense cyst  in the medial left kidney.    SAROJ JIMENEZ DO         SYSTEM ID:  [id number]       IMPRESSION/PLAN: Conjunctival melanoma and the right eye.  Melanoma is an ulcerated, 4.1 mm depth of invasion nodular melanoma in the right lower eyelid without lymphovascular or perineural invasion involving the inferior medial conjunctiva and tarsus.  Patient underwent definitive  wide local excision of the conjunctiva and right lower eyelid achieving negative surgical margins, and 1 right neck sentinel lymph node was negative for metastasis (04/05/2023).  There is no proven benefit for systemic adjuvant therapy, although there are case series for immune checkpoint inhibitor for locally advanced disease if enucleation is not feasible or desired, or for metastatic disease.  There is no evidence of recurrence or metastases noted on restaging CT neck, chest, abdomen, pelvis (05/20/2025).  Patient will continue close surveillance including clinical evaluation in medical oncology and ophthalmology clinic.  Surveillance consists of clinical evaluation every 3 months for year 1, every 6 months for years 2-3, and annually for years 4-5 and CT neck, chest, abdomen, pelvis every 6 months for years 1-2 and annually for years 3-5. Patient will return to clinic in 6 months.  Restaging CT neck, chest, abdomen, pelvis will be performed in 1 year. The current and past history, clinical evaluation, reviewing diagnostic tests and viewing CT images with the patient, and assessment and planning occurred over 30 minutes.       Dong Forte MD    cc: [facility]  Keven Gomez MD

## 2025-05-20 ENCOUNTER — ANCILLARY PROCEDURE (OUTPATIENT)
Dept: CT IMAGING | Facility: CLINIC | Age: 80
End: 2025-05-20
Attending: INTERNAL MEDICINE
Payer: COMMERCIAL

## 2025-05-20 ENCOUNTER — LAB (OUTPATIENT)
Dept: LAB | Facility: CLINIC | Age: 80
End: 2025-05-20
Payer: COMMERCIAL

## 2025-05-20 DIAGNOSIS — N18.32 CHRONIC KIDNEY DISEASE, STAGE 3B (H): Primary | ICD-10-CM

## 2025-05-20 DIAGNOSIS — C69.01 MALIGNANT MELANOMA OF CONJUNCTIVA, RIGHT (H): ICD-10-CM

## 2025-05-20 LAB
ALBUMIN SERPL BCG-MCNC: 4.7 G/DL (ref 3.5–5.2)
ALP SERPL-CCNC: 80 U/L (ref 40–150)
ALT SERPL W P-5'-P-CCNC: 22 U/L (ref 0–70)
ANION GAP SERPL CALCULATED.3IONS-SCNC: 12 MMOL/L (ref 7–15)
AST SERPL W P-5'-P-CCNC: 20 U/L (ref 0–45)
BASOPHILS # BLD AUTO: 0 10E3/UL (ref 0–0.2)
BASOPHILS NFR BLD AUTO: 1 %
BILIRUB SERPL-MCNC: 0.7 MG/DL
BUN SERPL-MCNC: 38.4 MG/DL (ref 8–23)
CALCIUM SERPL-MCNC: 9.9 MG/DL (ref 8.8–10.4)
CHLORIDE SERPL-SCNC: 104 MMOL/L (ref 98–107)
CREAT BLD-MCNC: 2 MG/DL (ref 0.7–1.2)
CREAT SERPL-MCNC: 1.9 MG/DL (ref 0.67–1.17)
CREAT UR-MCNC: 105 MG/DL
EGFRCR SERPLBLD CKD-EPI 2021: 33 ML/MIN/1.73M2
EGFRCR SERPLBLD CKD-EPI 2021: 35 ML/MIN/1.73M2
EOSINOPHIL # BLD AUTO: 0 10E3/UL (ref 0–0.7)
EOSINOPHIL NFR BLD AUTO: 0 %
ERYTHROCYTE [DISTWIDTH] IN BLOOD BY AUTOMATED COUNT: 13.8 % (ref 10–15)
GLUCOSE SERPL-MCNC: 63 MG/DL (ref 70–99)
HCO3 SERPL-SCNC: 24 MMOL/L (ref 22–29)
HCT VFR BLD AUTO: 43.6 % (ref 40–53)
HGB BLD-MCNC: 15.8 G/DL (ref 13.3–17.7)
IMM GRANULOCYTES # BLD: 0 10E3/UL
IMM GRANULOCYTES NFR BLD: 0 %
LYMPHOCYTES # BLD AUTO: 1.3 10E3/UL (ref 0.8–5.3)
LYMPHOCYTES NFR BLD AUTO: 18 %
MCH RBC QN AUTO: 31.6 PG (ref 26.5–33)
MCHC RBC AUTO-ENTMCNC: 36.2 G/DL (ref 31.5–36.5)
MCV RBC AUTO: 87 FL (ref 78–100)
MICROALBUMIN UR-MCNC: 129 MG/L
MICROALBUMIN/CREAT UR: 122.86 MG/G CR (ref 0–17)
MONOCYTES # BLD AUTO: 0.7 10E3/UL (ref 0–1.3)
MONOCYTES NFR BLD AUTO: 9 %
NEUTROPHILS # BLD AUTO: 5.4 10E3/UL (ref 1.6–8.3)
NEUTROPHILS NFR BLD AUTO: 72 %
NRBC # BLD AUTO: 0 10E3/UL
NRBC BLD AUTO-RTO: 0 /100
PLATELET # BLD AUTO: 175 10E3/UL (ref 150–450)
POTASSIUM SERPL-SCNC: 3.2 MMOL/L (ref 3.4–5.3)
PROT SERPL-MCNC: 7.3 G/DL (ref 6.4–8.3)
RBC # BLD AUTO: 5 10E6/UL (ref 4.4–5.9)
SODIUM SERPL-SCNC: 140 MMOL/L (ref 135–145)
WBC # BLD AUTO: 7.5 10E3/UL (ref 4–11)

## 2025-05-20 PROCEDURE — 82565 ASSAY OF CREATININE: CPT

## 2025-05-20 PROCEDURE — 36415 COLL VENOUS BLD VENIPUNCTURE: CPT

## 2025-05-20 PROCEDURE — 82570 ASSAY OF URINE CREATININE: CPT

## 2025-05-20 PROCEDURE — 71260 CT THORAX DX C+: CPT | Performed by: STUDENT IN AN ORGANIZED HEALTH CARE EDUCATION/TRAINING PROGRAM

## 2025-05-20 PROCEDURE — 74177 CT ABD & PELVIS W/CONTRAST: CPT | Performed by: STUDENT IN AN ORGANIZED HEALTH CARE EDUCATION/TRAINING PROGRAM

## 2025-05-20 PROCEDURE — 80053 COMPREHEN METABOLIC PANEL: CPT

## 2025-05-20 PROCEDURE — 70491 CT SOFT TISSUE NECK W/DYE: CPT | Performed by: RADIOLOGY

## 2025-05-20 PROCEDURE — 82043 UR ALBUMIN QUANTITATIVE: CPT

## 2025-05-20 PROCEDURE — 85025 COMPLETE CBC W/AUTO DIFF WBC: CPT

## 2025-05-20 RX ORDER — IOPAMIDOL 755 MG/ML
149 INJECTION, SOLUTION INTRAVASCULAR ONCE
Status: COMPLETED | OUTPATIENT
Start: 2025-05-20 | End: 2025-05-20

## 2025-05-20 RX ADMIN — IOPAMIDOL 149 ML: 755 INJECTION, SOLUTION INTRAVASCULAR at 09:07

## 2025-05-22 ENCOUNTER — ONCOLOGY VISIT (OUTPATIENT)
Dept: ONCOLOGY | Facility: CLINIC | Age: 80
End: 2025-05-22
Attending: INTERNAL MEDICINE
Payer: COMMERCIAL

## 2025-05-22 VITALS
TEMPERATURE: 98.6 F | OXYGEN SATURATION: 97 % | BODY MASS INDEX: 46.5 KG/M2 | DIASTOLIC BLOOD PRESSURE: 96 MMHG | HEART RATE: 72 BPM | WEIGHT: 306.8 LBS | SYSTOLIC BLOOD PRESSURE: 162 MMHG | RESPIRATION RATE: 16 BRPM | HEIGHT: 68 IN

## 2025-05-22 DIAGNOSIS — C69.01 MALIGNANT MELANOMA OF CONJUNCTIVA, RIGHT (H): Primary | ICD-10-CM

## 2025-05-22 PROCEDURE — G0463 HOSPITAL OUTPT CLINIC VISIT: HCPCS | Performed by: INTERNAL MEDICINE

## 2025-05-22 ASSESSMENT — PAIN SCALES - GENERAL: PAINLEVEL_OUTOF10: MODERATE PAIN (6)

## 2025-05-22 NOTE — NURSING NOTE
"Oncology Rooming Note    May 22, 2025 9:26 AM   Arthur Garcia is a 80 year old male who presents for:    Chief Complaint   Patient presents with    Oncology Clinic Visit     Malignant melanoma of conjunctiva     Initial Vitals: BP (!) 162/96 (BP Location: Right arm, Patient Position: Sitting, Cuff Size: Adult Large)   Pulse 72   Temp 98.6  F (37  C) (Oral)   Resp 16   Ht 1.727 m (5' 7.99\")   Wt (!) 139.2 kg (306 lb 12.8 oz)   SpO2 97%   BMI 46.66 kg/m   Estimated body mass index is 46.66 kg/m  as calculated from the following:    Height as of this encounter: 1.727 m (5' 7.99\").    Weight as of this encounter: 139.2 kg (306 lb 12.8 oz). Body surface area is 2.58 meters squared.  Moderate Pain (6) Comment: Data Unavailable   No LMP for male patient.  Allergies reviewed: Yes  Medications reviewed: Yes    Medications: Medication refills not needed today.  Pharmacy name entered into [employer]:    [facility] - [place], [place] - [address] [place] [address] [facility] [place], [place] - [address] [facility] [phone]    Frailty Screening:   Is the patient here for a new oncology consult visit in cancer care? 2. No    PHQ9:  Did this patient require a PHQ9?: No      Clinical concerns: none.      Bryce Knapp            "

## 2025-05-22 NOTE — LETTER
5/22/2025      Arthur Garcia  [address] [address] [place] [id number]      Dear Colleague,    Thank you for referring your patient, Arthur Garcia, to the [facility]. Please see a copy of my visit note below.      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  Patient presented with dryness, erythema, photophobia, and pain in the right eye not relieved by eye drops, artificial tears and ointment.  There was also a presumed conjunctival pyogenic granuloma in the right eye.  Patient underwent right eye conjunctival biopsy 12/05/2022, that revealed a ulcerated, nodular melanoma with a 4.1mm depth of invasion in the right lower eyelid that was positive for SOX10 and PRAME, but negative for AE1/AE3, CD45, ERG, and chromogranin.  Tumor extended to the lateral and deep margins.  There was no lymphovascular or perineural invasion.  There was no microsatellitosis. Tumor infiltrating lymphocytes were present and non-brisk without tumor regression. There were 13 mitoses/mm2.  MRI of brain and orbits 12/19/2022, was negative for intracranial metastases.  The orbits were unremarkable.  FDG PET/CT scan 01/02/2023, was negative for hypermetabolic lesions in the right lower eyelid.  There was no evidence of hypermetabolic lymphadenopathy or metastases.  There was a indeterminate 4 mm mildly enhancing focus in the right parotid gland with SUV max 4.  There was a 1.7 cm, non-FDG avid thyroid nodule. Patient underwent right conjunctiva wide excision and reconstruction with Amnioguard membrane, 02/13/2023, that revealed invasive conjunctival melanoma involving the inferior medial conjunctiva and tarsus.  Surgical margins were involved.  Subsequent wide local excision of the conjunctiva and right lower eyelid and right neck sentinel lymph node  biopsy 04/05/2023, revealed residual melanoma in situ adjacent to scar from the prior procedure, contacting the lateral margin.  Additional margins were negative for malignancy.  One right neck sentinel lymph node was negative for metastasis (0/1 lymph node involved).  There were small capsular Melan-A/Sox10-positive cells, consistent with maryam nevus.  Patient underwent right lower eyelid full-thickness reconstruction with tarsoconjunctival flap and myocutaneous advancement flap 04/19/2023, second stage right lower eyelid reconstruction with Early flap, 06/28/2023, and right lower eyelid ectropion repair with lateral tarsal strip and excision of the right upper eyelid seborrheic keratosis, 11/22/2023. There is no evidence of recurrence or metastases noted on restaging CT neck, chest, abdomen, pelvis (11/15/2024).  Right lower eyelid ectropion, was repaired 11/27/2024, which improved the eye irritation.  Right lower eyelid biopsy revealed collagenous soft tissue and conjunctival epithelium without evidence of melanoma.  Patient has been losing weight since starting semaglutide.  Patient has regurgitation after swallowing febuxostat in the morning, and he stopped taking the medication.  Patient also reports urinary incontinence since the TURP in 2005.  There are no other new symptoms or events since the prior clinic visit (11/21/2024). He denies fever, anorexia, weight loss, headache, visual changes, cough, dyspnea, chest pain, abdominal pain, nausea, vomiting, constipation, diarrhea, bleeding, or bone pain.     PAST HISTORY: Past history was reviewed and unchanged from the clinic note 11/21/2024.     MEDICATIONS:   Current Outpatient Medications   Medication Sig Dispense Refill     acetaminophen (TYLENOL) 325 MG tablet Take 2 tablets (650 mg) by mouth every 4 hours as needed for mild pain 50 tablet 0     atenolol (TENORMIN) 25 MG tablet Take 25 mg by mouth daily Once daily       DULoxetine (CYMBALTA) 60 MG capsule  TAKE ONE CAPSULE BY MOUTH EVERY MORNING FOR PAIN/DEPRESSION.       fluticasone (FLONASE) 50 MCG/ACT nasal spray Spray 1 spray into both nostrils as needed.       ketoconazole (NIZORAL) 2 % external shampoo Apply topically three times a week. Lather in the shower, wait 3-5 minutes before rinsing. 120 mL 11     ketorolac (ACULAR) 0.5 % ophthalmic solution Apply 1 drop to eye.       methocarbamol (ROBAXIN) 750 MG tablet as needed.       pravastatin (PRAVACHOL) 40 MG tablet TAKE ONE TABLET BY MOUTH DAILY FOR CHOLESTEROL       Semaglutide-Weight Management (WEGOVY) 2.4 MG/0.75ML pen Inject 2.4 mg subcutaneously once a week.       tamsulosin (FLOMAX) 0.4 MG capsule TAKE TWO CAPSULES BY MOUTH ONCE A DAY FOR PROSTATE       vitamin C (ASCORBIC ACID) 500 MG tablet Take 500 mg by mouth daily       brimonidine (ALPHAGAN-P) 0.15 % ophthalmic solution 1 drop daily. (Patient not taking: Reported on 5/22/2025)       celecoxib (CELEBREX) 100 MG capsule Take 100 mg by mouth 2 times daily. (Patient not taking: Reported on 5/22/2025)       cycloSPORINE (RESTASIS) 0.05 % ophthalmic emulsion Place 1 drop into both eyes 2 times daily. (Patient not taking: Reported on 5/22/2025)       dexamethasone (DECADRON) 0.1 % ophthalmic suspension Apply 1 drop to eye. (Patient not taking: Reported on 5/22/2025)       febuxostat (ULORIC) 80 MG TABS tablet TAKE ONE TABLET BY MOUTH EVERY MORNING FOR GOUT (Patient not taking: Reported on 5/22/2025)       loratadine (CLARITIN) 10 MG tablet Take 10 mg by mouth as needed. (Patient not taking: Reported on 5/22/2025)       melatonin 3 MG tablet Take 3-6 mg by mouth nightly as needed for sleep (Patient not taking: Reported on 5/22/2025)       Multiple Vitamin (THERA-TABS) TABS Take 1 tablet by mouth (Patient not taking: Reported on 5/22/2025)       neomycin-polymixin-dexAMETHasone (MAXITROL) 0.1 % ophthalmic suspension Please instill into the operative eye(s) four times daily until the bottle is finished.  "(Patient not taking: Reported on 5/22/2025) 5 mL 0     polyethylene glycol-propylene glycol (SYSTANE ULTRA) 0.4-0.3 % SOLN ophthalmic solution  (Patient not taking: Reported on 5/22/2025)       prednisoLONE acetate (PRED FORTE) 1 % ophthalmic suspension Place 1 drop into the right eye 4 times daily (Patient not taking: Reported on 5/22/2025)       predniSONE (DELTASONE) 20 MG tablet 40 mg (Patient not taking: Reported on 5/22/2025)       traZODone (DESYREL) 100 MG tablet Take 1 tablet by mouth as needed. (Patient not taking: Reported on 5/22/2025)       triamterene-hydrochlorothiazide (MAXZIDE) 75-50 MG per tablet Take 1 tablet by mouth daily Once daily (Patient not taking: Reported on 5/22/2025)       Witch Hazel (MEDICATED WIPES) 50 % PADS Apply topically. (Patient not taking: Reported on 5/22/2025)         REVIEW OF SYSTEMS: Review of systems reviewed with the patient and otherwise negative except for those detailed above.    PHYSICAL EXAM: BP (!) 162/96 (BP Location: Right arm, Patient Position: Sitting, Cuff Size: Adult Large)   Pulse 72   Temp 98.6  F (37  C) (Oral)   Resp 16   Ht 1.727 m (5' 7.99\")   Wt (!) 139.2 kg (306 lb 12.8 oz)   SpO2 97%   BMI 46.66 kg/m  .  Physical exam was unchanged from prior clinic visit 11/21/2024.  Skin: No erythema or rash.  HEENT: Sclera nonicteric. Oropharynx without lesions or ulceration, mucosa pink and moist.  Nodes: No cervical, supraclavicular, axillary, or inguinal adenopathy.  Lungs: No dullness to percussion.  No rales, wheezes, rhonchi.  Heart: Regular rate and rhythm.  Abdomen: Bowel sounds present.  Soft, nontender, no hepatosplenomegaly or mass.  Extremities: Trace ankle and pedal edema.      LABS REVIEWED:   Component      Latest Ref Rng 1/17/2025  1:21 PM 5/20/2025  9:38 AM   WBC      4.0 - 11.0 10e3/uL  7.5    RBC Count      4.40 - 5.90 10e6/uL  5.00    Hemoglobin      13.3 - 17.7 g/dL  15.8    Hematocrit      40.0 - 53.0 %  43.6    MCV      78 - 100 fL  " 87    MCH      26.5 - 33.0 pg  31.6    MCHC      31.5 - 36.5 g/dL  36.2    RDW      10.0 - 15.0 %  13.8    Platelet Count      150 - 450 10e3/uL  175    % Neutrophils      %  72    % Lymphocytes      %  18    % Monocytes      %  9    % Eosinophils      %  0    % Basophils      %  1    % Immature Granulocytes      %  0    NRBC/W      <1 /100  0    Absolute Neutrophil      1.6 - 8.3 10e3/uL  5.4    Absolute Lymphocytes      0.8 - 5.3 10e3/uL  1.3    Absolute Monocytes      0.0 - 1.3 10e3/uL  0.7    Absolute Eosinophils      0.0 - 0.7 10e3/uL  0.0    Absolute Basophils      0.0 - 0.2 10e3/uL  0.0    Absolute Immature Granulocytes      <=0.4 10e3/uL  0.0    Absolute NRBCs      10e3/uL  0.0    Sodium      135 - 145 mmol/L 141  140    Potassium      3.4 - 5.3 mmol/L 4.0  3.2 (L)    Carbon Dioxide (CO2)      22 - 29 mmol/L 24  24    Anion Gap      7 - 15 mmol/L 10  12    Urea Nitrogen      8.0 - 23.0 mg/dL 24.6 (H)  38.4 (H)    Creatinine      0.67 - 1.17 mg/dL 1.90 (H)  1.90 (H)    GFR Estimate      >60 mL/min/1.73m2 36 (L)  35 (L)    Calcium      8.8 - 10.4 mg/dL 9.5  9.9    Chloride      98 - 107 mmol/L 107  104    Glucose      70 - 99 mg/dL 82  63 (L)    Alkaline Phosphatase      40 - 150 U/L 63  80    AST      0 - 45 U/L 27  20    ALT      0 - 70 U/L 46  22    Protein Total      6.4 - 8.3 g/dL 6.5  7.3    Albumin      3.5 - 5.2 g/dL 3.9  4.7    Bilirubin Total      <=1.2 mg/dL 0.6  0.7        IMAGING REVIEWED: CT neck, chest, abdomen, pelvis 05/20/2025, was negative for adenopathy or metastases.    CT Soft Tissue Neck w Contrast    Narrative    CT SOFT TISSUE NECK W CONTRAST 5/20/2025 9:27 AM    History:  Conjunctival melanoma of the right eye.  CT neck, chest,  abdomen, pelvis requested to evaluate for recurrence or metastases.;  Malignant melanoma of conjunctiva, right (H)    From Chart:     ICD-10: Malignant melanoma of conjunctiva, right (H)      Comparison:  PET CT from 1/2/2023 and CT of the neck from  11/15/2024.     Technique: Following intravenous administration of nonionic iodinated  contrast medium, thin section helical CT images were obtained from the  skull base down to the level of the aortic arch.  Axial, coronal and  sagittal reformations were performed with 2-3 mm slice thickness  reconstruction. Images were reviewed in soft tissue, lung and bone  windows.    Contrast: 149 ml isovue 370    Findings:      Evaluation of the mucosal space demonstrates no evident abnormality  in the nasopharynx, oropharynx, hypopharynx or the glottis. The tongue  base appears normal.  The major salivary glands appear unremarkable.  The thyroid gland appears normal.    There is no evident cervical lymphadenopathy. Stable intraparotid  lymph nodes. The fascial spaces in the neck are intact bilaterally.   The major vascular structures in the neck appear unremarkable.    Evaluation of the osseous structures demonstrate no worrisome lytic or  sclerotic lesion. . No overt spinal canal or neuroforaminal stenosis.  The visualized paranasal sinuses are clear.. The visualized mastoid  air cells are clear.. Orbits appear unremarkable  . No ventriculomegaly, abnormal intracranial enhancement, evidence of  intracranial hemorrhage. Gray-white differentiation is preserved.    The visualized airspaces are clear. Pleural calcifications are noted  within the apices bilaterally.      Impression    Impression:  Normal CT study of the neck with contrast.  No evident mass or  adenopathy within the neck.     I have personally reviewed the examination and initial interpretation  and I agree with the findings.    PAOLA FAIRCHILD MD         SYSTEM ID:  [id number]       CT Chest/Abdomen/Pelvis w Contrast    Narrative    EXAMINATION: CT CHEST/ABDOMEN/PELVIS W CONTRAST, 5/20/2025 9:27 AM    TECHNIQUE: Helical CT images from the thoracic inlet through the  symphysis pubis were obtained with intravenous contrast. Contrast  dose: 149 cc  [id number]    COMPARISON: CT 1/17/2025 and 11/15/2024    HISTORY: Conjunctival melanoma of the right eye. CT neck, chest,  abdomen, pelvis requested to evaluate for recurrence or metastases.;  Malignant melanoma of conjunctiva, right (H)    FINDINGS:    Chest:    Heart/ Mediastinum: Heart is within normal limits. No evidence of  central pulmonary embolism. No bulky lymphadenopathy.  Esophagus  appears normal.    Lungs/pleura: The central tracheobronchial tree is patent.  No focal  mass or consolidation.  No suspicious nodules. Unchanged calcified  pleural plaques. A few scattered tiny nodules are also unchanged. No  pneumothorax or pleural effusion.     Chest wall/axilla: No bulky lymphadenopathy..    Abdomen and Pelvis:    Liver: Stable subcentimeter subcapsular probable cyst in segment 5. No  suspicious masses. No hepatic steatosis.     Gallbladder/biliary tree: Gallbladder is surgically absent. No biliary  dilatation.    Spleen: Unremarkable.    Pancreas: Stable subcentimeter hypodensity in the tail of the pancreas  on series 4 image 157.. No mass or ductal dilatation.    Adrenal glands: Unremarkable.    Kidneys: Multiple bilateral renal cysts including a hyperdense cyst in  the medial left kidney, unchanged. No hydronephrosis.    Bowel: Unremarkable. No obstruction. Normal appendix.    Retroperitoneum: Vasculature is grossly unremarkable..  No bulky  lymphadenopathy.    Pelvis: Urinary bladder is unremarkable.  Prostate and seminal  vesicles are within normal limits.    Bones: Moderate multilevel degenerative changes of the spine. No  suspicious lesions.    Soft Tissues: Unremarkable.      Impression    IMPRESSION:    1.  No convincing evidence of metastatic disease in the chest,  abdomen, or pelvis.  2.  Stable subcentimeter hypodense structure in the tail of pancreas.  3.  Stable bilateral renal cysts including a probable hyperdense cyst  in the medial left kidney.    SAROJ JIMENEZ DO         SYSTEM ID:   [id number]       IMPRESSION/PLAN: Conjunctival melanoma and the right eye.  Melanoma is an ulcerated, 4.1 mm depth of invasion nodular melanoma in the right lower eyelid without lymphovascular or perineural invasion involving the inferior medial conjunctiva and tarsus.  Patient underwent definitive wide local excision of the conjunctiva and right lower eyelid achieving negative surgical margins, and 1 right neck sentinel lymph node was negative for metastasis (04/05/2023).  There is no proven benefit for systemic adjuvant therapy, although there are case series for immune checkpoint inhibitor for locally advanced disease if enucleation is not feasible or desired, or for metastatic disease.  There is no evidence of recurrence or metastases noted on restaging CT neck, chest, abdomen, pelvis (05/20/2025).  Patient will continue close surveillance including clinical evaluation in medical oncology and ophthalmology clinic.  Surveillance consists of clinical evaluation every 3 months for year 1, every 6 months for years 2-3, and annually for years 4-5 and CT neck, chest, abdomen, pelvis every 6 months for years 1-2 and annually for years 3-5. Patient will return to clinic in 6 months.  Restaging CT neck, chest, abdomen, pelvis will be performed in 1 year. The current and past history, clinical evaluation, reviewing diagnostic tests and viewing CT images with the patient, and assessment and planning occurred over 30 minutes.       Dong Forte MD    cc: [facility]  Keven Gomez MD     Again, thank you for allowing me to participate in the care of your patient.        Sincerely,        Dong Forte MD    Electronically signed

## 2025-05-27 ENCOUNTER — RESULTS FOLLOW-UP (OUTPATIENT)
Dept: FAMILY MEDICINE | Facility: CLINIC | Age: 80
End: 2025-05-27

## 2025-05-27 NOTE — LETTER
May 27, 2025      Arthur Garcia  [address] [address] [place] [id number]        Dear ,    We are writing to inform you of your test results.    Your urine albumin is a little worse than 6 months ago.  Please follow-up with your primary care physician regarding this.    Resulted Orders   Albumin Random Urine Quantitative with Creat Ratio   Result Value Ref Range    Creatinine Urine mg/dL 105.0 mg/dL      Comment:      The reference ranges have not been established in urine creatinine. The results should be integrated into the clinical context for interpretation.    Albumin Urine mg/L 129.0 mg/L      Comment:      The reference ranges have not been established in urine albumin. The results should be integrated into the clinical context for interpretation.    Albumin Urine mg/g Cr 122.86 (H) 0.00 - 17.00 mg/g Cr      Comment:      Microalbuminuria is defined as an albumin:creatinine ratio of 17 to 299 for males and 25 to 299 for females. A ratio of albumin:creatinine of 300 or higher is indicative of overt proteinuria.  Due to biologic variability, positive results should be confirmed by a second, first-morning random or 24-hour timed urine specimen. If there is discrepancy, a third specimen is recommended. When 2 out of 3 results are in the microalbuminuria range, this is evidence for incipient nephropathy and warrants increased efforts at glucose control, blood pressure control, and institution of therapy with an angiotensin-converting-enzyme (ACE) inhibitor (if the patient can tolerate it).         If you have any questions or concerns, please call the clinic at the number listed above.       Sincerely,      Shayla Maria MD PhD  Electronically signed

## 2025-06-04 ENCOUNTER — TRANSCRIBE ORDERS (OUTPATIENT)
Dept: OTHER | Age: 80
End: 2025-06-04

## 2025-06-04 DIAGNOSIS — C69.01 MALIGNANT NEOPLASM OF RIGHT CONJUNCTIVA (H): Primary | ICD-10-CM

## 2025-06-05 ENCOUNTER — TELEPHONE (OUTPATIENT)
Dept: OPHTHALMOLOGY | Facility: CLINIC | Age: 80
End: 2025-06-05
Payer: COMMERCIAL

## 2025-06-05 NOTE — TELEPHONE ENCOUNTER
Left Voicemail (1st Attempt) for the patient to call back and schedule the following:    Appointment type: Return Plastics Eye  Provider:   Return date: 6/9/25  Writer's direct phone number: [phone]  Additional appointment(s) needed: Possible MRI same day but will need Order written as STAT  Additional Notes:  Return for new concerns of Malignant neoplasm of right conjunctiva-Per Suma Arango with [facility] Phone number: [phone]  Fax number: [phone]  [facility] Auth Number: [id number] Start Date 05/30/2025 End Date 12/20/2025

## 2025-06-09 ENCOUNTER — PATIENT OUTREACH (OUTPATIENT)
Dept: CARE COORDINATION | Facility: CLINIC | Age: 80
End: 2025-06-09
Payer: COMMERCIAL

## 2025-06-09 NOTE — PROGRESS NOTES
"Clinic Care Coordination Contact  [facility]/Voicemail    Clinical Data: Care Coordinator Outreach  Clinical Product Navigator RN reviewed chart; patient on payer product coverage.  Review results:   CPN Initial Information Gathering  Referral Source: Health Plan    Patient identified by their health plan for RN Clinical Product Navigator outreach:  \"An in home evaluation was performed 6/6/25. Member lives alone and uses cane or quad cane to ambulate. Member has abnormalities of gait and mobility. Member presented with elevated BP reading on exam, supine /100 P 63 and sitting /105 P 69. Member currently on HTN medications. Member was asked to visit an urgent care center STAT. Understands the recommendation & urgency and Recommendation accepted.\"       Outreach Documentation Number of Outreach Attempt   6/9/2025   2:37 PM 1       Left message on patient's voicemail with call back information and requested return call.      Plan: Care Coordinator will try to reach patient again in 1-2 business days.    Melissa Behl BSN, RN, PHN, [facility]  RN Clinical Product Navigator  [phone]     "

## 2025-06-09 NOTE — LETTER
[facility] COORDINATION  [address] [facility] [place] [address]   Lillie 10, 2025    Arthur Garcia  [address] [facility] [phone]      Dear Arthur,    I am an RN clinical navigator that works on behalf of [facility]; I wanted to introduce myself and role to you, as I can help you establish care with recommended providers for ongoing care within our health care system.     This role serves as a liaison between [facility]'s clinical network and the health insurance plans to provide guidance on establishing primary and specialty care needs. One of the benefits of having a primary care provider from the network is that your care team will help coordinate your care and guide you through any additional care you may need. Our care team is focused and trained to treat the whole person and can help you with all your physical, emotional, and social concerns.     Also, our Primary Care Clinics are all supported by Clinic Care Coordination:     The clinic care coordination team is made up of a registered nurse, , financial resource worker and community health worker who understand the health care system. The goal of clinic care coordination is to help you manage your health and improve access to the health care system. Our team works alongside your provider to assist you in determining your health and social needs. We can help you obtain health care and community resources, providing you with necessary information and education. We can work with you through any barriers and develop a care plan that helps coordinate and strengthen the communication between you and your care team.  Our services are voluntary and are offered without charge to you personally.    Please feel free to contact me with any questions or concerns regarding care coordination and what we can offer.      We are focused on providing you with the highest-quality healthcare experience possible.    Sincerely,        HUMBERTO Preciado Clinical Navigator  [phone]

## 2025-06-10 ENCOUNTER — TELEPHONE (OUTPATIENT)
Dept: OPHTHALMOLOGY | Facility: CLINIC | Age: 80
End: 2025-06-10
Payer: COMMERCIAL

## 2025-06-10 NOTE — TELEPHONE ENCOUNTER
Patient confirmed rescheduled appointment:  Date: 6/16/25  Time: 10:45AM  Visit type: RETURN PLASTICS EYE  Provider:   Location: [facility]  Testing/imaging: Can reschedule MRI for another date but not written as STAT for same day on 6/16/25.  Additional notes: [facility] Coverage-Return for new concerns of Malignant neoplasm of right conjunctiva -Per Suma Arango, OD-Appt Per PT [id number] Start Date 05/30/2025 End Date 12/20/2025        Patient is aware of time and location.

## 2025-06-10 NOTE — PROGRESS NOTES
Clinic Care Coordination Contact  Care Team Conversations    Writer received a call back from the patient.  Writer introduced role and purpose for call to patient.  Patient informed writer he is on a 14-day monitoring program through the [facility] for his blood pressure management.  The patient informed writer he had not taken his blood pressure medications yet for the day and his blood pressure this morning was 165/95, pulse 65.  He will take his medications and ensure to monitor his blood pressure after he has taken his medications.    Patient requested assistance in connecting to the ophthalmology care team who was assisting him last week.  Writer reviewed chart note from 6/5/25 telephone encounter and connected patient with Julianna who had been assisting patient with his ophthalmology concerns.  Patient had no further questions or concerns for writer at this time.    Melissa Behl BSN, RN, PHN, [facility]  RN Clinical Product Navigator  [phone]

## 2025-06-10 NOTE — PROGRESS NOTES
Clinic Care Coordination Contact  [facility]/Voicemail    Clinical Data: Care Coordinator Outreach    Outreach Documentation Number of Outreach Attempt   6/9/2025   2:37 PM 1   6/10/2025   9:47 AM 2       Left message on patient's voicemail with call back information and requested return call.      Plan: Care Coordinator will send care coordination introduction letter with care coordinator contact information and explanation of care coordination services via mail. Care Coordinator will do no further outreaches at this time.    Melissa Behl BSN, RN, PHN, CCM  RN Clinical Product Navigator  [phone]

## 2025-06-16 ENCOUNTER — OFFICE VISIT (OUTPATIENT)
Dept: OPHTHALMOLOGY | Facility: CLINIC | Age: 80
End: 2025-06-16
Payer: COMMERCIAL

## 2025-06-16 DIAGNOSIS — C43.112 MALIGNANT MELANOMA OF RIGHT LOWER EYELID INCLUDING CANTHUS (H): ICD-10-CM

## 2025-06-16 DIAGNOSIS — H16.401 CORNEAL NEOVASCULARIZATION OF RIGHT EYE: ICD-10-CM

## 2025-06-16 DIAGNOSIS — H02.122 MECHANICAL ECTROPION OF RIGHT LOWER EYELID: Primary | ICD-10-CM

## 2025-06-16 RX ORDER — NEOMYCIN SULFATE, POLYMYXIN B SULFATE, AND DEXAMETHASONE 3.5; 10000; 1 MG/G; [USP'U]/G; MG/G
0.5 OINTMENT OPHTHALMIC 3 TIMES DAILY
Qty: 3.5 G | Refills: 1 | Status: SHIPPED | OUTPATIENT
Start: 2025-06-16

## 2025-06-16 ASSESSMENT — TONOMETRY
IOP_METHOD: ICARE
OS_IOP_MMHG: 12
OD_IOP_MMHG: 11

## 2025-06-16 ASSESSMENT — CONF VISUAL FIELD
OD_INFERIOR_TEMPORAL_RESTRICTION: 0
OS_NORMAL: 1
OD_NORMAL: 1
OD_SUPERIOR_NASAL_RESTRICTION: 0
OS_INFERIOR_TEMPORAL_RESTRICTION: 0
OS_INFERIOR_NASAL_RESTRICTION: 0
OS_SUPERIOR_NASAL_RESTRICTION: 0
OS_SUPERIOR_TEMPORAL_RESTRICTION: 0
METHOD: COUNTING FINGERS
OD_SUPERIOR_TEMPORAL_RESTRICTION: 0
OD_INFERIOR_NASAL_RESTRICTION: 0

## 2025-06-16 ASSESSMENT — VISUAL ACUITY
OS_SC: 20/30
CORRECTION_TYPE: GLASSES
OD_SC: 20/40
OD_PH_SC: 20/30
METHOD: SNELLEN - LINEAR
OD_SC+: -1
OS_PH_SC: 20/30
OS_PH_SC+: +2

## 2025-06-16 ASSESSMENT — EXTERNAL EXAM - RIGHT EYE: OD_EXAM: NORMAL

## 2025-06-16 NOTE — PROGRESS NOTES
Chief Complaints and History of Present Illnesses   Patient presents with    Consult For     Patient states he is here for an a eye lid consult due to possible further surgery on right eye.S/p REPAIR, ECTROPION, RIGHT LOWER  EYELID RIGHT LOWER EYELID BIOPSY 11/24/25     Chief Complaint(s) and History of Present Illness(es)     Consult For    In right eye.  Associated symptoms include dryness and discharge.    Treatments tried include eye drops and artificial tears.  Pain was noted   as 3/10. Additional comments: Patient states he is here for an a eye lid   consult due to possible further surgery on right eye.S/p REPAIR,   ECTROPION, RIGHT LOWER  EYELID RIGHT LOWER EYELID BIOPSY 11/24/25         Comments     He's states that vision right eye get blurry right eye when driving.   Right eye get dry and burns and stilling. Has to rub right eye to clear   this up. Was told that right lid may need to be tightened further to   improve this. Patient states that he does not believe the Malignant   neoplasm of right conjunctiva is of any further concern and that this was   removed and believes that Lymph nodes appear clear per CT done on 5/20 and   also has pending 6/23/25.     Uses drops with right eye pink top unable to confirm name of drops. Once a   daily or PRN. Along with Lubricants Prn.  Once in a while use these drops with left eye also.     No issues or concerns with left eye.     Stephanie Powell, COT COT 8:56 AM June 16, 2025           Assessment & Plan     Arthur Garcia is a 80 year old male with the following diagnoses:   1. Mechanical ectropion of right lower eyelid    2. 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 Last seen by heme/onc on 5/22/25 with no evidence of recurrence on CT scan. Continue close surveillance  "with onc.  Progress Notes by Dong Forte MD (05/22/2025 9:30 AM)     Continues to have right eye redness and irritation most days.   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  I have confirmed and edited as necessary the chief complaint(s), history of present illness, review of systems, relevant history, and examination findings as documented by others.  I have personally reviewed the relevant tests, images, and reports as documented above.  I have confirmed and edited as necessary the assessment and plan and agree with this note.    - Keven Gomez MD 9:51 AM 6/16/2025      "

## 2025-06-23 ENCOUNTER — OFFICE VISIT (OUTPATIENT)
Dept: OPHTHALMOLOGY | Facility: CLINIC | Age: 80
End: 2025-06-23
Attending: OPHTHALMOLOGY
Payer: COMMERCIAL

## 2025-06-23 ENCOUNTER — ANCILLARY PROCEDURE (OUTPATIENT)
Dept: MRI IMAGING | Facility: CLINIC | Age: 80
End: 2025-06-23
Attending: OPHTHALMOLOGY
Payer: COMMERCIAL

## 2025-06-23 DIAGNOSIS — C43.112 MALIGNANT MELANOMA OF RIGHT LOWER EYELID INCLUDING CANTHUS (H): ICD-10-CM

## 2025-06-23 DIAGNOSIS — H02.122 MECHANICAL ECTROPION OF RIGHT LOWER EYELID: Primary | ICD-10-CM

## 2025-06-23 DIAGNOSIS — H16.401 CORNEAL NEOVASCULARIZATION OF RIGHT EYE: ICD-10-CM

## 2025-06-23 DIAGNOSIS — H04.123 DRY EYE SYNDROME OF BOTH EYES: ICD-10-CM

## 2025-06-23 DIAGNOSIS — Z96.1 PSEUDOPHAKIA, BOTH EYES: ICD-10-CM

## 2025-06-23 DIAGNOSIS — H02.122 MECHANICAL ECTROPION OF RIGHT LOWER EYELID: ICD-10-CM

## 2025-06-23 PROCEDURE — 68761 CLOSE TEAR DUCT OPENING: CPT | Performed by: OPHTHALMOLOGY

## 2025-06-23 PROCEDURE — A9585 GADOBUTROL INJECTION: HCPCS | Performed by: STUDENT IN AN ORGANIZED HEALTH CARE EDUCATION/TRAINING PROGRAM

## 2025-06-23 PROCEDURE — G0463 HOSPITAL OUTPT CLINIC VISIT: HCPCS | Performed by: OPHTHALMOLOGY

## 2025-06-23 PROCEDURE — 70543 MRI ORBT/FAC/NCK W/O &W/DYE: CPT | Mod: GC | Performed by: STUDENT IN AN ORGANIZED HEALTH CARE EDUCATION/TRAINING PROGRAM

## 2025-06-23 PROCEDURE — 99214 OFFICE O/P EST MOD 30 MIN: CPT | Mod: GC | Performed by: OPHTHALMOLOGY

## 2025-06-23 RX ORDER — GADOBUTROL 604.72 MG/ML
15 INJECTION INTRAVENOUS ONCE
Status: COMPLETED | OUTPATIENT
Start: 2025-06-23 | End: 2025-06-23

## 2025-06-23 RX ORDER — CYCLOSPORINE 0.5 MG/ML
1 EMULSION OPHTHALMIC 2 TIMES DAILY
Qty: 60 EACH | Refills: 11 | Status: SHIPPED | OUTPATIENT
Start: 2025-06-23

## 2025-06-23 RX ADMIN — GADOBUTROL 14 ML: 604.72 INJECTION INTRAVENOUS at 08:57

## 2025-06-23 ASSESSMENT — VISUAL ACUITY
OS_CC: 20/30
METHOD: SNELLEN - LINEAR
OD_PH_CC+: -2
OS_CC+: +1
OD_CC: 20/40
OS_PH_CC: 20/25
CORRECTION_TYPE: GLASSES
OD_CC+: +2
OD_PH_CC: 20/25

## 2025-06-23 ASSESSMENT — EXTERNAL EXAM - RIGHT EYE: OD_EXAM: NORMAL

## 2025-06-23 ASSESSMENT — REFRACTION_WEARINGRX
OD_SPHERE: -0.50
OS_AXIS: 171
OD_ADD: +2.50
OS_ADD: +2.50
OS_SPHERE: -1.00
OS_CYLINDER: +1.75
OD_CYLINDER: SPHERE

## 2025-06-23 ASSESSMENT — TONOMETRY
OS_IOP_MMHG: 13
OD_IOP_MMHG: 12
IOP_METHOD: ICARE

## 2025-06-23 NOTE — PATIENT INSTRUCTIONS
Eye drops right eye  Restasis 2 times a day  Artificial tears every 2 hours   Maxtirol  (Dexamethasone, senia) 1 application once daily for 2 weeks then stop  Artificial tear ointments at night

## 2025-06-23 NOTE — DISCHARGE INSTRUCTIONS
MRI Contrast Discharge Instructions    The IV contrast you received today will pass out of your body in your  urine. This will happen in the next 24 hours. You will not feel this process.  Your urine will not change color.    Drink at least 4 extra glasses of water or juice today (unless your doctor  has restricted your fluids). This reduces the stress on your kidneys.  You may take your regular medicines.    If you are on dialysis: It is best to have dialysis today.    If you have a reaction: Most reactions happen right away. If you have  any new symptoms after leaving the hospital (such as hives or swelling),  call your hospital at the correct number below. Or call your family doctor.  If you have breathing distress or wheezing, call 911.    Special instructions: ***    I have read and understand the above information.    Signature:______________________________________ Date:___________    Staff:__________________________________________ Date:___________     Time:__________    [facility] Departments:    ___[place]: [phone]  ___[facility]: [phone]  ___[place]: [phone] ___[facility]: [phone]  ___[facility]: [phone]  ___[facility]: [phone]  ___[phone]  ___[facility]: [phone]  ___[phone]

## 2025-06-23 NOTE — PROGRESS NOTES
Chief complaint   KNV and symblepharon right eye     HPI    Arthur Garcia 80 year old male with history of conjunctival melanoma right eye s/p resection 4/2023 with negative CT scan 5/22/25.     Here for evaluation of symblepharon and exposure right eye with KNV.      Chief Complaint(s) and History of Present Illness(es)       Follow Up    In right eye.  Associated symptoms include eye pain, redness and foreign body sensation.             Comments    Pt reports he's been putting salve in his eye which makes it difficult to see. Pt's daughter states a lot of blood vessels are growing over his cornea and it might be rejecting it. His daughter reports he has painful eyes and it feels like there's sandpaper. With pain medication, his pain is at a 4/10. He takes a lot of Tylenol for knee pain which pt states affects his blood pressure. He said surgery was scheduled for him here today.     Ocular Meds:   Maxitrol gala attempts TID right eye  Pred 1-2x daily OD      Yudi Llamasstein OA 9:56 AM June 23, 2025                      Past ocular history   Prior eye surgery/laser/Trauma: 2023 Conjunctival biopsy, conjunctival excision with Early flap, RLL LTS, RLL LTS repeat   CTL wearer:No  Glasses : yes   Family Hx of eye disease: -    PMH     Past Medical History:   Diagnosis Date    Arthritis     Depression     Disorder of lipoprotein and lipid metabolism     Gout     Hypertension     Sleep apnea        PSH     Past Surgical History:   Procedure Laterality Date    APPENDECTOMY  01/01/2000    BIOPSY NODE SENTINEL Right 4/5/2023    Procedure: SENTINAL LYMPH  NODE BIOPSY;  Surgeon: Jose Guadalupe Warner MD;  Location: [facility] OR    CHOLECYSTECTOMY  1999 or 2000    EXCISE LESION CONJUNCTIVAL Right 2/13/2023    Procedure: wide local excision of right conjuntivia lesion;  Surgeon: Keven Gomez MD;  Location: [facility] OR    EXCISE LESION EYELID Right 11/22/2023    Procedure: RIGHT UPPER EYELID LESION EXCISION;  Surgeon: Jason  MD Keven;  Location: [facility]    EXCISE TUMOR EYELID Right 4/5/2023    Procedure: Wide Excision of Right Lower eyelid melanoma;  Surgeon: Keven Gomez MD;  Location: [facility] OR    PLACEMENT, AMNIOTIC MEMBRANE GRAFT Right 2/13/2023    Procedure: PLACEMENT, AMNIOTIC MEMBRANE GRAFT;  Surgeon: Keven Gomez MD;  Location: [facility] OR    IA TRABECULOPLASTY BY LASER SURGERY      RECONSTRUCT EYELID Right 6/28/2023    Procedure: SECOND STAGE RECONSTRUCTION, RIGHT LOWER EYELID;  Surgeon: Keven Gomez MD;  Location: [facility]    RECONSTRUCT EYELID WITH POSTAURICULAR SKIN GRAFT Right 4/19/2023    Procedure: right RECONSTRUCTION EYELID, MEDIAL AND LATERAL BIOPSY;  Surgeon: Keven Gomez MD;  Location: [facility] OR    REPAIR ECTROPION Right 11/22/2023    Procedure: Right lower eyelid ectropion repair by tarsal strip procedure;  Surgeon: Keven Gomez MD;  [facility]: [facility]    REPAIR ECTROPION Right 11/27/2024    Procedure: REPAIR, ECTROPION, RIGHT LOWER  EYELID RIGHT LOWER EYELID BIOPSY;  Surgeon: Keven Gomez MD;  Location: [facility]       Meds     Current Outpatient Medications   Medication Sig Dispense Refill    acetaminophen (TYLENOL) 325 MG tablet Take 2 tablets (650 mg) by mouth every 4 hours as needed for mild pain 50 tablet 0    DULoxetine (CYMBALTA) 60 MG capsule TAKE ONE CAPSULE BY MOUTH EVERY MORNING FOR PAIN/DEPRESSION.      fluticasone (FLONASE) 50 MCG/ACT nasal spray Spray 1 spray into both nostrils as needed.      ketoconazole (NIZORAL) 2 % external shampoo Apply topically three times a week. Lather in the shower, wait 3-5 minutes before rinsing. 120 mL 11    methocarbamol (ROBAXIN) 750 MG tablet as needed.      neomycin-polymyxin-dexAMETHasone (MAXITROL) 3.5-87517-6.1 ophthalmic ointment Place 0.1429 Applications (0.5 g) into the right eye 3 times daily. 3.5 g 1    pravastatin (PRAVACHOL) 40 MG tablet TAKE ONE TABLET BY MOUTH DAILY FOR CHOLESTEROL      prednisoLONE acetate (PRED FORTE) 1 % ophthalmic suspension  Place 1 drop into the right eye 4 times daily (Patient taking differently: Place 1 drop into the right eye 4 times daily. 1 or 2x daily)      Semaglutide-Weight Management (WEGOVY) 2.4 MG/0.75ML pen Inject 2.4 mg subcutaneously once a week.      tamsulosin (FLOMAX) 0.4 MG capsule TAKE TWO CAPSULES BY MOUTH ONCE A DAY FOR PROSTATE      vitamin C (ASCORBIC ACID) 500 MG tablet Take 500 mg by mouth daily      atenolol (TENORMIN) 25 MG tablet Take 25 mg by mouth daily Once daily      brimonidine (ALPHAGAN-P) 0.15 % ophthalmic solution 1 drop daily. (Patient not taking: Reported on 5/22/2025)      celecoxib (CELEBREX) 100 MG capsule Take 100 mg by mouth 2 times daily. (Patient not taking: Reported on 5/22/2025)      cycloSPORINE (RESTASIS) 0.05 % ophthalmic emulsion Place 1 drop into both eyes 2 times daily. (Patient not taking: Reported on 6/23/2025)      dexamethasone (DECADRON) 0.1 % ophthalmic suspension Apply 1 drop to eye. (Patient not taking: Reported on 6/23/2025)      febuxostat (ULORIC) 80 MG TABS tablet TAKE ONE TABLET BY MOUTH EVERY MORNING FOR GOUT (Patient not taking: Reported on 5/22/2025)      ketorolac (ACULAR) 0.5 % ophthalmic solution Apply 1 drop to eye. (Patient not taking: Reported on 6/23/2025)      loratadine (CLARITIN) 10 MG tablet Take 10 mg by mouth as needed. (Patient not taking: Reported on 5/22/2025)      melatonin 3 MG tablet Take 3-6 mg by mouth nightly as needed for sleep (Patient not taking: Reported on 5/22/2025)      Multiple Vitamin (THERA-TABS) TABS Take 1 tablet by mouth (Patient not taking: Reported on 5/22/2025)      neomycin-polymixin-dexAMETHasone (MAXITROL) 0.1 % ophthalmic suspension Please instill into the operative eye(s) four times daily until the bottle is finished. (Patient not taking: Reported on 6/23/2025) 5 mL 0    polyethylene glycol-propylene glycol (SYSTANE ULTRA) 0.4-0.3 % SOLN ophthalmic solution  (Patient not taking: Reported on 6/23/2025)      predniSONE  (DELTASONE) 20 MG tablet 40 mg (Patient not taking: Reported on 6/23/2025)      traZODone (DESYREL) 100 MG tablet Take 1 tablet by mouth as needed. (Patient not taking: Reported on 5/22/2025)      triamterene-hydrochlorothiazide (MAXZIDE) 75-50 MG per tablet Take 1 tablet by mouth daily Once daily (Patient not taking: Reported on 5/22/2025)      Witch Hazel (MEDICATED WIPES) 50 % PADS Apply topically. (Patient not taking: Reported on 5/22/2025)       No current facility-administered medications for this visit.       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  # Dry eye syndrome each eye R>>>L    Superior KNV can be caused by the eyelid vs Limbal insufficiency,  However excision was inferior therefore a superior limbal keratitis is more likely    plan  Start restasis BID Ou  - AT at least q2hrs right eye   - Stop maxitrol   - consider BCL to stabilize surface   Discussed R/B/A of punctal plugs,including over tearing  patient agree to proceed with upper duct of right eye using collagen plugs  Refer for scleral lenses. Patient has nasal symblepharon and told might need release if CL does not fit in well      #pseudophakia right eye   Operated with Dr Barcenas     Follow up: 2 months with cornea [facility] SLP. next available with optom Dr Romero for CL    Oph:    Silvestre Husain MD  Resident Physician, PGY-3  Department of Ophthalmology     Attending Physician Attestation:  Complete documentation of historical and exam elements from today's encounter can be found in the full encounter summary report (not reduplicated in this progress note).  I personally obtained the chief complaint(s) and history of present illness.  I confirmed and edited as necessary the review of systems, past medical/surgical history, family history,  social history, and examination findings as documented by others; and I examined the patient myself.  I personally reviewed the relevant tests, images, and reports as documented above.  I formulated and edited as necessary the assessment and plan and discussed the findings and management plan with the patient and family. - Mauricio Lara MD

## 2025-07-03 ENCOUNTER — PATIENT OUTREACH (OUTPATIENT)
Dept: ONCOLOGY | Facility: CLINIC | Age: 80
End: 2025-07-03
Payer: COMMERCIAL

## 2025-07-03 NOTE — PROGRESS NOTES
[facility]: Cancer Care                                                                                          Chart review for enrollment status and outreach audit.    Bernadette INFANTE RN  Cancer Care Coordinator  [facility]

## 2025-07-22 ENCOUNTER — TELEPHONE (OUTPATIENT)
Dept: OPTOMETRY | Facility: CLINIC | Age: 80
End: 2025-07-22

## 2025-07-22 ENCOUNTER — OFFICE VISIT (OUTPATIENT)
Dept: OPTOMETRY | Facility: CLINIC | Age: 80
End: 2025-07-22
Payer: COMMERCIAL

## 2025-07-22 DIAGNOSIS — H04.123 DRY EYES: ICD-10-CM

## 2025-07-22 DIAGNOSIS — H16.211 EXPOSURE KERATOPATHY, RIGHT: Primary | ICD-10-CM

## 2025-07-22 PROBLEM — F43.12 CHRONIC POST-TRAUMATIC STRESS DISORDER (PTSD) AFTER MILITARY COMBAT: Status: ACTIVE | Noted: 2025-07-22

## 2025-07-22 PROBLEM — K02.52 DENTAL CARIES ON PIT AND FISSURE SURFACE PENETRATING INTO DENTIN: Status: ACTIVE | Noted: 2025-07-22

## 2025-07-22 PROBLEM — N39.8 DYSFUNCTIONAL VOIDING OF URINE: Status: ACTIVE | Noted: 2022-03-16

## 2025-07-22 PROBLEM — M17.9 OSTEOARTHRITIS OF KNEE, UNSPECIFIED: Status: ACTIVE | Noted: 2025-07-22

## 2025-07-22 PROBLEM — F32.A DEPRESSIVE DISORDER: Status: ACTIVE | Noted: 2025-07-22

## 2025-07-22 PROBLEM — K03.6 DEPOSITS (ACCRETIONS) ON TEETH: Status: ACTIVE | Noted: 2025-07-22

## 2025-07-22 PROBLEM — E78.5 HYPERLIPIDEMIA: Status: ACTIVE | Noted: 2022-03-21

## 2025-07-22 PROBLEM — N23 RENAL COLIC ON LEFT SIDE: Status: ACTIVE | Noted: 2022-03-16

## 2025-07-22 PROBLEM — M54.16 RADICULOPATHY, LUMBAR REGION: Status: ACTIVE | Noted: 2025-07-22

## 2025-07-22 PROBLEM — R35.0 URINARY FREQUENCY: Status: ACTIVE | Noted: 2022-03-16

## 2025-07-22 PROBLEM — M54.50 LOW BACK PAIN, UNSPECIFIED: Status: ACTIVE | Noted: 2025-07-22

## 2025-07-22 PROBLEM — I10 ESSENTIAL (PRIMARY) HYPERTENSION: Status: ACTIVE | Noted: 2025-07-22

## 2025-07-22 PROBLEM — G89.29 CHRONIC PAIN: Status: ACTIVE | Noted: 2025-07-22

## 2025-07-22 PROBLEM — J30.2 SEASONAL ALLERGIES: Status: ACTIVE | Noted: 2025-07-22

## 2025-07-22 PROBLEM — L60.0 INGROWING NAIL: Status: ACTIVE | Noted: 2025-07-22

## 2025-07-22 PROBLEM — H90.3 SENSORINEURAL HEARING LOSS, BILATERAL: Status: ACTIVE | Noted: 2025-07-22

## 2025-07-22 PROBLEM — M62.81 MUSCLE WEAKNESS (GENERALIZED): Status: ACTIVE | Noted: 2025-07-22

## 2025-07-22 PROBLEM — N28.1 CYST OF KIDNEY, ACQUIRED: Status: ACTIVE | Noted: 2025-07-22

## 2025-07-22 PROBLEM — M79.0 RHEUMATISM AND FIBROSITIS: Status: ACTIVE | Noted: 2025-07-22

## 2025-07-22 PROBLEM — M17.0 BILATERAL PRIMARY OSTEOARTHRITIS OF KNEE: Status: ACTIVE | Noted: 2025-07-22

## 2025-07-22 PROBLEM — C44.91 BASAL CELL CARCINOMA OF SKIN: Status: ACTIVE | Noted: 2025-07-22

## 2025-07-22 PROBLEM — B00.52 HERPES SIMPLEX KERATITIS: Status: ACTIVE | Noted: 2025-07-22

## 2025-07-22 PROBLEM — H10.10 ALLERGIC CONJUNCTIVITIS: Status: ACTIVE | Noted: 2025-07-22

## 2025-07-22 PROBLEM — J20.9 ACUTE BRONCHITIS, UNSPECIFIED: Status: ACTIVE | Noted: 2025-07-22

## 2025-07-22 PROBLEM — G47.33 OBSTRUCTIVE SLEEP APNEA (ADULT) (PEDIATRIC): Status: ACTIVE | Noted: 2025-07-22

## 2025-07-22 PROBLEM — R31.9 HEMATURIA: Status: ACTIVE | Noted: 2025-07-22

## 2025-07-22 PROBLEM — C43.112: Status: ACTIVE | Noted: 2025-07-22

## 2025-07-22 PROBLEM — Z65.9 PROBLEM RELATED TO UNSPECIFIED PSYCHOSOCIAL CIRCUMSTANCES: Status: ACTIVE | Noted: 2025-07-22

## 2025-07-22 PROBLEM — K02.62 DENTAL CARIES ON SMOOTH SURFACE PENETRATING INTO DENTIN: Status: ACTIVE | Noted: 2025-07-22

## 2025-07-22 PROBLEM — H02.839 DERMATOCHALASIS: Status: ACTIVE | Noted: 2025-07-22

## 2025-07-22 PROBLEM — M10.9 GOUT: Status: ACTIVE | Noted: 2025-07-22

## 2025-07-22 PROBLEM — D03.112: Status: ACTIVE | Noted: 2025-07-22

## 2025-07-22 PROBLEM — L82.0 INFLAMED SEBORRHEIC KERATOSIS: Status: ACTIVE | Noted: 2025-07-22

## 2025-07-22 PROBLEM — F33.1 MAJOR DEPRESSIVE DISORDER, RECURRENT, MODERATE (H): Status: ACTIVE | Noted: 2025-07-22

## 2025-07-22 PROBLEM — R26.81 UNSTEADINESS ON FEET: Status: ACTIVE | Noted: 2025-07-22

## 2025-07-22 PROBLEM — H35.379 EPIRETINAL MEMBRANE: Status: ACTIVE | Noted: 2025-07-22

## 2025-07-22 PROBLEM — L98.9 DISORDER OF THE SKIN AND SUBCUTANEOUS TISSUE, UNSPECIFIED: Status: ACTIVE | Noted: 2025-07-22

## 2025-07-22 PROBLEM — S61.221A: Status: ACTIVE | Noted: 2025-07-22

## 2025-07-22 PROBLEM — H43.819 POSTERIOR VITREOUS DETACHMENT: Status: ACTIVE | Noted: 2025-07-22

## 2025-07-22 PROBLEM — Z85.840 HISTORY OF MALIGNANT MELANOMA OF EYE: Status: ACTIVE | Noted: 2025-07-22

## 2025-07-22 PROBLEM — H25.10 NUCLEAR SCLEROTIC CATARACT: Status: ACTIVE | Noted: 2025-07-22

## 2025-07-22 PROBLEM — N20.0 CALCULUS OF KIDNEY: Status: ACTIVE | Noted: 2025-07-22

## 2025-07-22 PROBLEM — Z77.29 EXPOSURE TO POTENTIALLY HAZARDOUS SUBSTANCE: Status: ACTIVE | Noted: 2025-07-22

## 2025-07-22 PROBLEM — R68.89 OTHER GENERAL SYMPTOMS AND SIGNS: Status: ACTIVE | Noted: 2025-07-22

## 2025-07-22 PROBLEM — M79.7 RHEUMATISM AND FIBROSITIS: Status: ACTIVE | Noted: 2025-07-22

## 2025-07-22 PROBLEM — M65.30 ACQUIRED TRIGGER FINGER: Status: ACTIVE | Noted: 2025-07-22

## 2025-07-22 PROBLEM — J34.2 DEVIATED NASAL SEPTUM: Status: ACTIVE | Noted: 2025-07-22

## 2025-07-22 PROBLEM — M13.80 OTHER SPECIFIED ARTHRITIS, UNSPECIFIED SITE: Status: ACTIVE | Noted: 2025-07-22

## 2025-07-22 PROBLEM — H52.4 PRESBYOPIA: Status: ACTIVE | Noted: 2025-07-22

## 2025-07-22 PROBLEM — M25.549 ARTHRALGIA OF HAND: Status: ACTIVE | Noted: 2025-07-22

## 2025-07-22 PROBLEM — H16.8 MARGINAL KERATITIS: Status: ACTIVE | Noted: 2025-07-22

## 2025-07-22 RX ORDER — PROBENECID 500 MG/1
1000 TABLET, FILM COATED ORAL
COMMUNITY
Start: 2024-07-22

## 2025-07-22 ASSESSMENT — REFRACTION_CURRENTRX
OD_DIAMETER: 14.8
OD_SPHERE: -2.00
OD_CYLINDER: SPHERE
OD_BASECURVE: 4.2/7.34

## 2025-07-22 ASSESSMENT — VISUAL ACUITY
CORRECTION_TYPE: GLASSES
OD_CC: 20/25
OS_CC: 20/20
OD_CC+: -3
OS_CC+: -1
METHOD: SNELLEN - LINEAR

## 2025-07-22 ASSESSMENT — REFRACTION_WEARINGRX
OD_CYLINDER: SPHERE
OS_AXIS: 171
OD_SPHERE: -0.50
OS_ADD: +2.50
OS_SPHERE: -1.00
OS_CYLINDER: +1.75
OD_ADD: +2.50

## 2025-07-22 ASSESSMENT — TONOMETRY
OS_IOP_MMHG: 11
IOP_METHOD: ICARE
OD_IOP_MMHG: 09

## 2025-07-22 NOTE — PROGRESS NOTES
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 Corrects well. Conjunctiva somewhat edematous today making fitting difficult but will monitor during fitting process.   -Rec starting with right eye but may elect to fit left eye if doing well. I&R may be challenging     [facility] [place] today for lens [facility]    Order lens and HOLD for tech I&R same day as Dr. Lara appt. I will follow-up with him mid-sept same day as next Dr. Jason acevedot.     I have confirmed the patient's CC, HPI and reviewed Past Medical History, Past Surgical History, Social History, Family History, Problem List, Medication List and agree with Tech note.     TORSTEN MaryO FSLS

## 2025-07-22 NOTE — TELEPHONE ENCOUNTER
Per patients request [facility] at [phone].  And left a massage with the Nurse line about needing additional referrals for the Eye cornea department and the Eye contact lens department.

## 2025-08-15 ENCOUNTER — TRANSFERRED RECORDS (OUTPATIENT)
Dept: HEALTH INFORMATION MANAGEMENT | Facility: CLINIC | Age: 80
End: 2025-08-15
Payer: COMMERCIAL

## 2025-08-18 ENCOUNTER — TRANSFERRED RECORDS (OUTPATIENT)
Dept: HEALTH INFORMATION MANAGEMENT | Facility: CLINIC | Age: 80
End: 2025-08-18
Payer: COMMERCIAL

## 2025-08-18 ENCOUNTER — TRANSCRIBE ORDERS (OUTPATIENT)
Dept: OTHER | Age: 80
End: 2025-08-18

## 2025-08-18 DIAGNOSIS — H16.211 EXPOSURE KERATOCONJUNCTIVITIS, RIGHT EYE: Primary | ICD-10-CM

## 2025-08-19 ENCOUNTER — PATIENT OUTREACH (OUTPATIENT)
Dept: CARE COORDINATION | Facility: CLINIC | Age: 80
End: 2025-08-19
Payer: COMMERCIAL

## 2025-08-21 ENCOUNTER — PATIENT OUTREACH (OUTPATIENT)
Dept: CARE COORDINATION | Facility: CLINIC | Age: 80
End: 2025-08-21
Payer: COMMERCIAL

## 2025-08-25 ENCOUNTER — TRANSCRIBE ORDERS (OUTPATIENT)
Dept: OTHER | Age: 80
End: 2025-08-25

## 2025-08-25 ENCOUNTER — ALLIED HEALTH/NURSE VISIT (OUTPATIENT)
Dept: OPTOMETRY | Facility: CLINIC | Age: 80
End: 2025-08-25
Payer: COMMERCIAL

## 2025-08-25 ENCOUNTER — OFFICE VISIT (OUTPATIENT)
Dept: OPHTHALMOLOGY | Facility: CLINIC | Age: 80
End: 2025-08-25
Attending: OPHTHALMOLOGY
Payer: COMMERCIAL

## 2025-08-25 DIAGNOSIS — H16.211 EXPOSURE KERATOPATHY, RIGHT: Primary | ICD-10-CM

## 2025-08-25 DIAGNOSIS — H16.211 EXPOSURE KERATOCONJUNCTIVITIS, RIGHT EYE: ICD-10-CM

## 2025-08-25 DIAGNOSIS — C44.319 BASAL CELL CARCINOMA OF SKIN OF OTHER PARTS OF FACE: Primary | ICD-10-CM

## 2025-08-25 DIAGNOSIS — H04.123 DRY EYE SYNDROME OF BOTH EYES: Primary | ICD-10-CM

## 2025-08-25 PROCEDURE — G2211 COMPLEX E/M VISIT ADD ON: HCPCS | Mod: GC | Performed by: OPHTHALMOLOGY

## 2025-08-25 PROCEDURE — 99213 OFFICE O/P EST LOW 20 MIN: CPT | Mod: GC | Performed by: OPHTHALMOLOGY

## 2025-08-25 PROCEDURE — G0463 HOSPITAL OUTPT CLINIC VISIT: HCPCS | Performed by: OPHTHALMOLOGY

## 2025-08-25 RX ORDER — SODIUM CHLORIDE FOR INHALATION 0.9 %
3 VIAL, NEBULIZER (ML) INHALATION 3 TIMES DAILY
Qty: 300 ML | Refills: 11 | Status: SHIPPED | OUTPATIENT
Start: 2025-08-25

## 2025-08-25 ASSESSMENT — PATIENT HEALTH QUESTIONNAIRE - PHQ9: SUM OF ALL RESPONSES TO PHQ QUESTIONS 1-9: 0

## 2025-08-25 ASSESSMENT — CONF VISUAL FIELD
OD_INFERIOR_NASAL_RESTRICTION: 0
OS_NORMAL: 1
OD_INFERIOR_TEMPORAL_RESTRICTION: 0
OD_SUPERIOR_TEMPORAL_RESTRICTION: 0
OS_INFERIOR_TEMPORAL_RESTRICTION: 0
OS_SUPERIOR_NASAL_RESTRICTION: 0
OD_SUPERIOR_NASAL_RESTRICTION: 0
OS_INFERIOR_NASAL_RESTRICTION: 0
OD_NORMAL: 1
OS_SUPERIOR_TEMPORAL_RESTRICTION: 0

## 2025-08-25 ASSESSMENT — TONOMETRY
OD_IOP_MMHG: 11
IOP_METHOD: ICARE
IOP_METHOD: ICARE
OS_IOP_MMHG: 11
OD_IOP_MMHG: 11
OS_IOP_MMHG: 11

## 2025-08-25 ASSESSMENT — VISUAL ACUITY
OD_CC: 20/20
OS_CC: 20/25
METHOD: SNELLEN - LINEAR
CORRECTION_TYPE: CONTACTS
OS_CC+: -3
CORRECTION_TYPE: GLASSES, CONTACTS
OD_CC: 20/20
OS_CC+: -3
OS_CC: 20/25
METHOD: SNELLEN - LINEAR

## 2025-08-26 ENCOUNTER — PATIENT OUTREACH (OUTPATIENT)
Dept: CARE COORDINATION | Facility: CLINIC | Age: 80
End: 2025-08-26
Payer: COMMERCIAL

## 2025-08-26 ASSESSMENT — REFRACTION_CURRENTRX
OD_SPHERE: -1.75
OD_DIAMETER: 14.8
OD_BASECURVE: 4.0/7.67
OD_CYLINDER: SPHERE

## 2025-08-28 ENCOUNTER — PATIENT OUTREACH (OUTPATIENT)
Dept: CARE COORDINATION | Facility: CLINIC | Age: 80
End: 2025-08-28
Payer: COMMERCIAL

## (undated) DEVICE — STRAP UNIVERSAL POSITIONING 2-PIECE 4X47X76" 91-287

## (undated) DEVICE — SU VICRYL 6-0 LP-1 18" D8097

## (undated) DEVICE — LABEL MEDICATION SYSTEM 3303-P

## (undated) DEVICE — COVER CAMERA IN-LIGHT DISP LT-C02

## (undated) DEVICE — PREP SKIN SCRUB TRAY 4461A

## (undated) DEVICE — BLADE KNIFE SURG 15 371115

## (undated) DEVICE — SOL WATER IRRIG 500ML BOTTLE 2F7113

## (undated) DEVICE — SYR 03ML LL W/O NDL 309657

## (undated) DEVICE — NDL 30GA 0.5" 305106

## (undated) DEVICE — ESU CORD BIPOLAR GREEN 10-4000

## (undated) DEVICE — EYE PREP BETADINE 5% SOLUTION 30ML 0065-0411-30

## (undated) DEVICE — SU PDS II 5-0 RB-2DA 30" Z148H

## (undated) DEVICE — SU SILK 0 TIE 6X18" A186H

## (undated) DEVICE — SU VICRYL 5-0 P-2 18" UND J503G

## (undated) DEVICE — PACK MINOR EYE CUSTOM ASC

## (undated) DEVICE — SOL NACL 0.9% IRRIG 1000ML BOTTLE 2F7124

## (undated) DEVICE — SU VICRYL 6-0 P-1 18" UND J489G

## (undated) DEVICE — ESU NDL COLORADO MICRO E1651

## (undated) DEVICE — STRAP KNEE/BODY 31143004

## (undated) DEVICE — SU PROLENE 5-0 RB-2DA 30" 8710H

## (undated) DEVICE — ESU PENCIL SMOKE EVAC W/ROCKER SWITCH 0703-047-000

## (undated) DEVICE — ESU GROUND PAD ADULT W/CORD E7507

## (undated) DEVICE — SOL WATER IRRIG 1000ML BOTTLE 2F7114

## (undated) DEVICE — ESU CORD BIPOLAR AND IRR TUBING AESCULAP US355

## (undated) DEVICE — PACK NEURO MINOR UMMC SNE32MNMU4

## (undated) DEVICE — SU SILK 4-0 RB-1 30" K871H

## (undated) DEVICE — SU PLAIN 6-0 G-1DA 18" 770G

## (undated) DEVICE — DRSG TELFA 3X8" 1238

## (undated) DEVICE — EYE SHIELD CORNEAL CROUCH  E5699

## (undated) DEVICE — SU VICRYL 3-0 SH 27" UND J416H

## (undated) DEVICE — PACK MINOR EYE

## (undated) DEVICE — SU CHROMIC 6-0 S-14DA 18" 1791G

## (undated) DEVICE — SUCTION MANIFOLD NEPTUNE 2 SYS 4 PORT 0702-020-000

## (undated) DEVICE — SU SILK 3-0 TIE 12X30" A304H

## (undated) DEVICE — TUBING SUCTION 10'X3/16" N510

## (undated) DEVICE — GLOVE BIOGEL PI MICRO SZ 7.5 48575

## (undated) DEVICE — PREP POVIDONE IODINE SOLUTION 10% 4OZ

## (undated) DEVICE — ESU EYE HIGH TEMP 65410-183

## (undated) DEVICE — NDL COUNTER 20CT 31142493

## (undated) DEVICE — LINEN TOWEL PACK X6 WHITE 5487

## (undated) DEVICE — DRAPE HEAD 43X39" 9556

## (undated) DEVICE — Device

## (undated) DEVICE — DECANTER TRANSFER DEVICE 2008S

## (undated) DEVICE — DRAPE MAYO STAND 23X54 8337

## (undated) DEVICE — DRSG GAUZE 4X4" 8044

## (undated) DEVICE — LINEN TOWEL PACK X5 5464

## (undated) DEVICE — APPLICATOR COTTON TIP 3" 9325

## (undated) DEVICE — ESU PENCIL W/HOLSTER

## (undated) DEVICE — SU VICRYL 5-0 S-14DA 18" J571G

## (undated) DEVICE — POSITIONER ARMBOARD FOAM 1PAIR LF FP-ARMB1

## (undated) DEVICE — SPONGE KITTNER 30-101

## (undated) DEVICE — ANGLED WIRELESS GAMMA PROBE COVER

## (undated) DEVICE — SU PROLENE 5-0 RB-2DA 18" 8713H

## (undated) DEVICE — SU SILK 4-0 TIE 12X30" A303H

## (undated) DEVICE — DRSG STERI STRIP 1/2X4" R1547

## (undated) DEVICE — STPL SKIN 35W ROTATING HEAD PRW35

## (undated) DEVICE — ESU EYE LOW TEMP AA17

## (undated) DEVICE — CLIP HORIZON MED BLUE 002200

## (undated) DEVICE — PEN MARKING SKIN VISIMARK 1424SR

## (undated) DEVICE — SYR 01ML 27GA 0.5" NDL TBC 309623

## (undated) DEVICE — GLOVE BIOGEL PI MICRO SZ 7.0 48570

## (undated) DEVICE — MARKING PEN ULTRA-FINE WITH RULER 1436SR-100

## (undated) DEVICE — SU MONOCRYL 5-0 P-3 18" UND Y493G

## (undated) DEVICE — BLADE KNIFE BEAVER MICROSHARP GREEN 377515

## (undated) DEVICE — ESU ELEC BLADE 2.75" COATED/INSULATED E1455

## (undated) DEVICE — PREP POVIDONE-IODINE 10% SOLUTION 4OZ BOTTLE MDS093944

## (undated) DEVICE — DRSG GAUZE 4X4" TRAY 6939

## (undated) DEVICE — SU PLAIN FAST ABSORB 5-0 PC-1 18" 1915G

## (undated) DEVICE — DRAPE U SPLIT 74X120" 29440

## (undated) DEVICE — ESU HIGH TEMP LOOP TIP AA03

## (undated) DEVICE — CLIP HORIZON SM RED WIDE SLOT 001201

## (undated) DEVICE — SU SILK 2-0 TIE 12X30" A305H

## (undated) DEVICE — SYR 10ML FINGER CONTROL W/O NDL 309695

## (undated) DEVICE — APPLICATORS COTTON TIP 6"X2 STERILE LF C15053-006

## (undated) DEVICE — PREP POVIDONE-IODINE 7.5% SCRUB 4OZ BOTTLE MDS093945

## (undated) DEVICE — SUCTION SLEEVE NEPTUNE 2 125MM 0703-005-125

## (undated) DEVICE — EYE SPONGE SPEAR WECK CEL 0008685

## (undated) DEVICE — ESU ELEC NDL 1" COATED/INSULATED E1465

## (undated) RX ORDER — LIDOCAINE HYDROCHLORIDE AND EPINEPHRINE 10; 10 MG/ML; UG/ML
INJECTION, SOLUTION INFILTRATION; PERINEURAL
Status: DISPENSED
Start: 2023-04-19

## (undated) RX ORDER — EPHEDRINE SULFATE 50 MG/ML
INJECTION, SOLUTION INTRAMUSCULAR; INTRAVENOUS; SUBCUTANEOUS
Status: DISPENSED
Start: 2023-04-19

## (undated) RX ORDER — LIDOCAINE HYDROCHLORIDE AND EPINEPHRINE 10; 10 MG/ML; UG/ML
INJECTION, SOLUTION INFILTRATION; PERINEURAL
Status: DISPENSED
Start: 2023-02-13

## (undated) RX ORDER — FENTANYL CITRATE 50 UG/ML
INJECTION, SOLUTION INTRAMUSCULAR; INTRAVENOUS
Status: DISPENSED
Start: 2023-04-05

## (undated) RX ORDER — ALBUTEROL SULFATE 90 UG/1
AEROSOL, METERED RESPIRATORY (INHALATION)
Status: DISPENSED
Start: 2023-04-19

## (undated) RX ORDER — OXYCODONE HYDROCHLORIDE 5 MG/1
TABLET ORAL
Status: DISPENSED
Start: 2023-11-22

## (undated) RX ORDER — ONDANSETRON 2 MG/ML
INJECTION INTRAMUSCULAR; INTRAVENOUS
Status: DISPENSED
Start: 2023-04-19

## (undated) RX ORDER — CEFAZOLIN SODIUM/WATER 3 G/30 ML
SYRINGE (ML) INTRAVENOUS
Status: DISPENSED
Start: 2023-02-13

## (undated) RX ORDER — DEXAMETHASONE SODIUM PHOSPHATE 4 MG/ML
INJECTION, SOLUTION INTRA-ARTICULAR; INTRALESIONAL; INTRAMUSCULAR; INTRAVENOUS; SOFT TISSUE
Status: DISPENSED
Start: 2023-06-28

## (undated) RX ORDER — DEXAMETHASONE SODIUM PHOSPHATE 4 MG/ML
INJECTION, SOLUTION INTRA-ARTICULAR; INTRALESIONAL; INTRAMUSCULAR; INTRAVENOUS; SOFT TISSUE
Status: DISPENSED
Start: 2023-04-19

## (undated) RX ORDER — ONDANSETRON 2 MG/ML
INJECTION INTRAMUSCULAR; INTRAVENOUS
Status: DISPENSED
Start: 2023-06-28

## (undated) RX ORDER — IODINE AND POTASSIUM IODIDE 50; 100 MG/ML; MG/ML
LIQUID ORAL
Status: DISPENSED
Start: 2023-02-13

## (undated) RX ORDER — FENTANYL CITRATE 50 UG/ML
INJECTION, SOLUTION INTRAMUSCULAR; INTRAVENOUS
Status: DISPENSED
Start: 2023-02-13

## (undated) RX ORDER — ERYTHROMYCIN 5 MG/G
OINTMENT OPHTHALMIC
Status: DISPENSED
Start: 2022-12-05

## (undated) RX ORDER — CEFAZOLIN SODIUM/WATER 3 G/30 ML
SYRINGE (ML) INTRAVENOUS
Status: DISPENSED
Start: 2023-04-19

## (undated) RX ORDER — FENTANYL CITRATE 50 UG/ML
INJECTION, SOLUTION INTRAMUSCULAR; INTRAVENOUS
Status: DISPENSED
Start: 2023-04-19

## (undated) RX ORDER — PROPOFOL 10 MG/ML
INJECTION, EMULSION INTRAVENOUS
Status: DISPENSED
Start: 2023-06-28

## (undated) RX ORDER — PROPOFOL 10 MG/ML
INJECTION, EMULSION INTRAVENOUS
Status: DISPENSED
Start: 2023-04-19

## (undated) RX ORDER — FENTANYL CITRATE 50 UG/ML
INJECTION, SOLUTION INTRAMUSCULAR; INTRAVENOUS
Status: DISPENSED
Start: 2023-06-28

## (undated) RX ORDER — ERYTHROMYCIN 5 MG/G
OINTMENT OPHTHALMIC
Status: DISPENSED
Start: 2023-04-19

## (undated) RX ORDER — BALANCED SALT SOLUTION 6.4; .75; .48; .3; 3.9; 1.7 MG/ML; MG/ML; MG/ML; MG/ML; MG/ML; MG/ML
SOLUTION OPHTHALMIC
Status: DISPENSED
Start: 2023-02-13

## (undated) RX ORDER — CEFAZOLIN SODIUM/WATER 2 G/20 ML
SYRINGE (ML) INTRAVENOUS
Status: DISPENSED
Start: 2023-02-13

## (undated) RX ORDER — DEXAMETHASONE SODIUM PHOSPHATE 4 MG/ML
INJECTION, SOLUTION INTRA-ARTICULAR; INTRALESIONAL; INTRAMUSCULAR; INTRAVENOUS; SOFT TISSUE
Status: DISPENSED
Start: 2023-04-12

## (undated) RX ORDER — BUPIVACAINE HYDROCHLORIDE 5 MG/ML
INJECTION, SOLUTION EPIDURAL; INTRACAUDAL
Status: DISPENSED
Start: 2023-02-13

## (undated) RX ORDER — HYDROMORPHONE HYDROCHLORIDE 1 MG/ML
INJECTION, SOLUTION INTRAMUSCULAR; INTRAVENOUS; SUBCUTANEOUS
Status: DISPENSED
Start: 2023-04-05

## (undated) RX ORDER — OXYCODONE HYDROCHLORIDE 10 MG/1
TABLET ORAL
Status: DISPENSED
Start: 2023-02-13

## (undated) RX ORDER — PROPOFOL 10 MG/ML
INJECTION, EMULSION INTRAVENOUS
Status: DISPENSED
Start: 2023-04-12

## (undated) RX ORDER — ERYTHROMYCIN 5 MG/G
OINTMENT OPHTHALMIC
Status: DISPENSED
Start: 2023-04-05

## (undated) RX ORDER — PROPOFOL 10 MG/ML
INJECTION, EMULSION INTRAVENOUS
Status: DISPENSED
Start: 2023-02-13

## (undated) RX ORDER — LIDOCAINE HYDROCHLORIDE AND EPINEPHRINE 10; 10 MG/ML; UG/ML
INJECTION, SOLUTION INFILTRATION; PERINEURAL
Status: DISPENSED
Start: 2023-04-05

## (undated) RX ORDER — ACETAMINOPHEN 325 MG/1
TABLET ORAL
Status: DISPENSED
Start: 2023-02-13

## (undated) RX ORDER — ONDANSETRON 2 MG/ML
INJECTION INTRAMUSCULAR; INTRAVENOUS
Status: DISPENSED
Start: 2023-04-12

## (undated) RX ORDER — FENTANYL CITRATE 50 UG/ML
INJECTION, SOLUTION INTRAMUSCULAR; INTRAVENOUS
Status: DISPENSED
Start: 2024-11-27

## (undated) RX ORDER — FENTANYL CITRATE-0.9 % NACL/PF 10 MCG/ML
PLASTIC BAG, INJECTION (ML) INTRAVENOUS
Status: DISPENSED
Start: 2023-04-19

## (undated) RX ORDER — LABETALOL HYDROCHLORIDE 5 MG/ML
INJECTION, SOLUTION INTRAVENOUS
Status: DISPENSED
Start: 2023-04-05

## (undated) RX ORDER — CEFAZOLIN SODIUM/WATER 3 G/30 ML
SYRINGE (ML) INTRAVENOUS
Status: DISPENSED
Start: 2023-04-05

## (undated) RX ORDER — FENTANYL CITRATE 50 UG/ML
INJECTION, SOLUTION INTRAMUSCULAR; INTRAVENOUS
Status: DISPENSED
Start: 2023-04-12

## (undated) RX ORDER — ACETAMINOPHEN 325 MG/1
TABLET ORAL
Status: DISPENSED
Start: 2023-04-12

## (undated) RX ORDER — EPHEDRINE SULFATE 50 MG/ML
INJECTION, SOLUTION INTRAMUSCULAR; INTRAVENOUS; SUBCUTANEOUS
Status: DISPENSED
Start: 2023-06-28

## (undated) RX ORDER — LABETALOL HYDROCHLORIDE 5 MG/ML
INJECTION, SOLUTION INTRAVENOUS
Status: DISPENSED
Start: 2023-04-19

## (undated) RX ORDER — ACETAMINOPHEN 325 MG/1
TABLET ORAL
Status: DISPENSED
Start: 2023-11-22

## (undated) RX ORDER — GLYCOPYRROLATE 0.2 MG/ML
INJECTION INTRAMUSCULAR; INTRAVENOUS
Status: DISPENSED
Start: 2023-04-12